# Patient Record
Sex: FEMALE | Race: WHITE | NOT HISPANIC OR LATINO | Employment: OTHER | URBAN - METROPOLITAN AREA
[De-identification: names, ages, dates, MRNs, and addresses within clinical notes are randomized per-mention and may not be internally consistent; named-entity substitution may affect disease eponyms.]

---

## 2017-03-03 ENCOUNTER — OFFICE VISIT (OUTPATIENT)
Dept: NEUROLOGY | Facility: CLINIC | Age: 42
End: 2017-03-03

## 2017-03-03 VITALS
OXYGEN SATURATION: 99 % | DIASTOLIC BLOOD PRESSURE: 79 MMHG | SYSTOLIC BLOOD PRESSURE: 119 MMHG | WEIGHT: 176 LBS | HEART RATE: 86 BPM | RESPIRATION RATE: 20 BRPM | HEIGHT: 64 IN | BODY MASS INDEX: 30.05 KG/M2

## 2017-03-03 DIAGNOSIS — G40.209 PARTIAL SYMPTOMATIC EPILEPSY WITH COMPLEX PARTIAL SEIZURES, NOT INTRACTABLE, WITHOUT STATUS EPILEPTICUS (H): Primary | ICD-10-CM

## 2017-03-03 DIAGNOSIS — G40.909 SEIZURE DISORDER (H): ICD-10-CM

## 2017-03-03 DIAGNOSIS — G40.209 PARTIAL SYMPTOMATIC EPILEPSY WITH COMPLEX PARTIAL SEIZURES, NOT INTRACTABLE, WITHOUT STATUS EPILEPTICUS (H): ICD-10-CM

## 2017-03-03 RX ORDER — LEVETIRACETAM 500 MG/1
1000 TABLET ORAL 2 TIMES DAILY
Qty: 360 TABLET | Refills: 11 | Status: SHIPPED | OUTPATIENT
Start: 2017-03-03 | End: 2018-03-12

## 2017-03-03 ASSESSMENT — PAIN SCALES - GENERAL: PAINLEVEL: SEVERE PAIN (6)

## 2017-03-03 NOTE — PROGRESS NOTES
March 3, 2017        Theresa Hall NP   [facility]    [address] [place] [address]      RE: Iva Valencia   MRN: [id number]    : 1975              Dear Dr. Hall:        Ms. 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  She also suffers from depression and also marijuana use.  She is going through a hard time as her son who is in the [employer] recently broke his leg in one of the exercises in [place].  Her  is from [place] and she is concerned about him as well.  She had an emergency room visit on  for depression with suicidal ideation.  She reports depression has continued but she is not suicidal.  We encouraged her to go have psych care and she says she will be setting that up.  We did her 's forms today.  Last seizure was in 2016 at which time we did not fill out the form.  She says she has not had any seizures lately.  She is on levetiracetam on a dose of 1000 twice a day.  Her last concentration on her previous visit in 2016 was 42.4.  Her last EEG with me was in  and it showed abnormal right temporal slowing with no interictal abnormalities.  She has had imaging studies of her brain.  The last MRI of her brain was done in  when she was seeing Dr. 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      Iva is a very brittle person with depression again very severe.  We will very cautiously approve her 's license but in the past she has broken through with seizures.  She needs to get into therapy, especially now that she is experiencing uncertainty with her  who is from [place].        She would do that.  She is not suicidal today.  We will see her and check her levels today.        Sincerely,        MD AMY Soto MD             D: 2017 12:03   T: 2017  14:44   MT:       Name:     GENE REYES   MRN:      [id number]        Account:      [id number]   :      1975           Service Date: 2017      Document: [id number]

## 2017-03-03 NOTE — LETTER
3/3/2017       RE: Iva Valencia  [address]    [facility] [address]     Dear Colleague,    Thank you for referring your patient, Iva Valencia, to the [facility] NEUROLOGY at [facility]. Please see a copy of my visit note below.    March 3, 2017        Theresa Hall NP   [facility]    [address] [place] [address]      RE: Iva Valencia   MRN: [id number]    : 1975              Dear Dr. Hall:        Ms. 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  She also suffers from depression and also marijuana use.  She is going through a hard time as her son who is in the [employer] recently broke his leg in one of the exercises in [place].  Her  is from [place] and she is concerned about him as well.  She had an emergency room visit on  for depression with suicidal ideation.  She reports depression has continued but she is not suicidal.  We encouraged her to go have psych care and she says she will be setting that up.  We did her 's forms today.  Last seizure was in 2016 at which time we did not fill out the form.  She says she has not had any seizures lately.  She is on levetiracetam on a dose of 1000 twice a day.  Her last concentration on her previous visit in 2016 was 42.4.  Her last EEG with me was in  and it showed abnormal right temporal slowing with no interictal abnormalities.  She has had imaging studies of her brain.  The last MRI of her brain was done in  when she was seeing Dr. 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      Iva is a very brittle person with depression again very severe.  We will very cautiously approve her 's license but in the past she has broken through with seizures.  She needs to get into therapy, especially now that  she is experiencing uncertainty with her  who is from [place].        She would do that.  She is not suicidal today.  We will see her and check her levels today.        Sincerely,        Jon Boss MD      D: 2017 12:03   T: 2017 14:44   MT: kt      Name:     GENE REYES   MRN:      [id number]        Account:      [id number]   :      1975           Service Date: 2017      Document: [id number]

## 2017-03-04 LAB — LEVETIRACETAM SERPL-MCNC: 39.2 UG/ML

## 2017-05-20 ENCOUNTER — HOSPITAL ENCOUNTER (EMERGENCY)
Facility: CLINIC | Age: 42
Discharge: HOME | End: 2017-05-20
Attending: EMERGENCY MEDICINE | Admitting: EMERGENCY MEDICINE
Payer: MEDICARE

## 2017-05-20 VITALS
SYSTOLIC BLOOD PRESSURE: 134 MMHG | RESPIRATION RATE: 18 BRPM | OXYGEN SATURATION: 100 % | TEMPERATURE: 98.8 F | DIASTOLIC BLOOD PRESSURE: 94 MMHG

## 2017-05-20 DIAGNOSIS — F43.20 ADJUSTMENT DISORDER, UNSPECIFIED TYPE: ICD-10-CM

## 2017-05-20 PROCEDURE — 25000132 ZZH RX MED GY IP 250 OP 250 PS 637: Mod: GY | Performed by: EMERGENCY MEDICINE

## 2017-05-20 PROCEDURE — 99284 EMERGENCY DEPT VISIT MOD MDM: CPT | Mod: Z6 | Performed by: EMERGENCY MEDICINE

## 2017-05-20 PROCEDURE — 99283 EMERGENCY DEPT VISIT LOW MDM: CPT

## 2017-05-20 RX ORDER — LORAZEPAM 1 MG/1
1 TABLET ORAL ONCE
Status: COMPLETED | OUTPATIENT
Start: 2017-05-20 | End: 2017-05-20

## 2017-05-20 RX ORDER — HYDROXYZINE HYDROCHLORIDE 25 MG/1
50-100 TABLET, FILM COATED ORAL
Qty: 30 TABLET | Refills: 1 | Status: SHIPPED | OUTPATIENT
Start: 2017-05-20 | End: 2018-02-19

## 2017-05-20 RX ADMIN — LORAZEPAM 1 MG: 1 TABLET ORAL at 01:49

## 2017-05-20 NOTE — DISCHARGE INSTRUCTIONS
Please make an appointment to follow up with [facility] (phone: [phone]) as soon as possible.

## 2017-05-20 NOTE — ED AVS SNAPSHOT
[facility], Emergency Department    [address]    [facility] [address]    Phone:  [phone]    Fax:  [phone]                                       Iva Valencia   MRN: [id number]    Department:  [facility], Emergency Department   Date of Visit:  5/20/2017           After Visit Summary Signature Page     I have received my discharge instructions, and my questions have been answered. I have discussed any challenges I see with this plan with the nurse or doctor.    ..........................................................................................................................................  Patient/Patient Representative Signature      ..........................................................................................................................................  Patient Representative Print Name and Relationship to Patient    ..................................................               ................................................  Date                                            Time    ..........................................................................................................................................  Reviewed by Signature/Title    ...................................................              ..............................................  Date                                                            Time

## 2017-05-20 NOTE — ED AVS SNAPSHOT
[facility], Emergency Department    [address]    [place] [address]    Phone:  [phone]    Fax:  [phone]                                       Iva Valencia   MRN: [id number]    Department:  [facility], Emergency Department   Date of Visit:  5/20/2017           Patient Information     Date Of Birth          1975        Your diagnoses for this visit were:     Adjustment disorder, unspecified type        You were seen by Jesus Dunne MD.        Discharge Instructions       Please make an appointment to follow up with [facility] (phone: [phone]) as soon as possible.      Future Appointments        Provider Department Dept Phone Center    9/5/2017 11:00 AM Jon Boss MD [facility] Neurology [phone] [facility]      24 Hour Appointment Hotline       To make an appointment at any [facility], call [phone] ([phone]). If you don't have a family doctor or clinic, we will help you find one. [facility] are conveniently located to serve the needs of you and your family.             Review of your medicines      START taking        Dose / Directions Last dose taken    hydrOXYzine 25 MG tablet   Commonly known as:  ATARAX   Dose:   mg   Quantity:  30 tablet        Take 2-4 tablets ( mg) by mouth nightly as needed for itching   Refills:  1          Our records show that you are taking the medicines listed below. If these are incorrect, please call your family doctor or clinic.        Dose / Directions Last dose taken    CYMBALTA 60 MG EC capsule   Dose:  120 mg   Generic drug:  DULoxetine        Take 120 mg by mouth daily   Refills:  0        diphenhydrAMINE 25 MG tablet   Commonly known as:  BENADRYL   Quantity:  30 tablet        Take 1-2 tablets up to every 6 hours as needed for facial movements.   Refills:  0        FLEXERIL PO        Refills:  0        fluticasone 50 MCG/ACT spray   Commonly known as:  FLONASE   Dose:  1-2 spray    Quantity:  1 Package        Spray 1-2 sprays into both nostrils daily as needed.   Refills:  2        ibuprofen 200 MG tablet   Commonly known as:  ADVIL/MOTRIN   Dose:  1-2 tablet        Take 1-2 tablets by mouth. Every 4 to 6 hours as needed.   Refills:  0        IRON SUPPLEMENT PO   Dose:  65 mg        Take 65 mg by mouth daily (with breakfast)   Refills:  0        levETIRAcetam 500 MG tablet   Commonly known as:  KEPPRA   Dose:  1000 mg   Quantity:  360 tablet        Take 2 tablets (1,000 mg) by mouth 2 times daily   Refills:  11        levothyroxine 50 MCG tablet   Commonly known as:  SYNTHROID   Dose:  50 mcg   Quantity:  90 tablet        Take 1 tablet by mouth daily.   Refills:  1        MELATONIN PO        Refills:  0        METRONIDAZOLE PO   Dose:  500 mg        Take 500 mg by mouth 2 times daily   Refills:  0        Multi-vitamin Tabs tablet   Dose:  1 tablet        Take 1 tablet by mouth daily   Refills:  0        NEXIUM PO        Take by mouth 2 times daily   Refills:  0        omega-3 fatty acids 1200 MG capsule   Dose:  1 capsule        Take 1 capsule by mouth daily.   Refills:  0        TORADOL IM   Dose:  1 mL        Inject 1 mL into the muscle as needed   Refills:  0        TYLENOL 500 MG tablet   Dose:  1-2 tablet   Generic drug:  acetaminophen        Take 1-2 tablets by mouth every 6 hours as needed.   Refills:  0        ZOFRAN 8 MG tablet   Dose:  8 mg   Generic drug:  ondansetron        Take 8 mg by mouth every 8 hours as needed for nausea   Refills:  0                Prescriptions were sent or printed at these locations (1 Prescription)                   Other Prescriptions                Printed at Department/Unit printer (1 of 1)         hydrOXYzine (ATARAX) 25 MG tablet                Orders Needing Specimen Collection     None      Pending Results     No orders found from 5/18/2017 to 5/21/2017.            Pending Culture Results     No orders found from 5/18/2017 to 5/21/2017.           "  Pending Results Instructions     If you had any lab results that were not finalized at the time of your Discharge, you can call the ED Lab Result RN at [phone]. You will be contacted by this team for any positive Lab results or changes in treatment. The nurses are available 7 days a week from 10A to 6:30P.  You can leave a message 24 hours per day and they will return your call.        Thank you for choosing [facility]       Thank you for choosing [facility] for your care. Our goal is always to provide you with excellent care. Hearing back from our patients is one way we can continue to improve our services. Please take a few minutes to complete the written survey that you may receive in the mail after you visit with us. Thank you!        [facility] Information     [employer] lets you send messages to your doctor, view your test results, renew your prescriptions, schedule appointments and more. To sign up, go to [email or web address] . Click on \"Log in\" on the left side of the screen, which will take you to the Welcome page. Then click on \"Sign up Now\" on the right side of the page.     You will be asked to enter the access code listed below, as well as some personal information. Please follow the directions to create your username and password.     Your access code is: [email or web address]  Expires: 2017  7:31 AM     Your access code will  in 90 days. If you need help or a new code, please call your [facility] clinic or [phone].        Care EveryWhere ID     This is your Care EveryWhere ID. This could be used by other organizations to access your [facility] medical records  [id number]        After Visit Summary       This is your record. Keep this with you and show to your community pharmacist(s) and doctor(s) at your next visit.                  "

## 2017-05-20 NOTE — ED NOTES
Bed: HW  Expected date: 5/20/17  Expected time: 12:16 AM  Means of arrival: Ambulance  Comments:  Renée [address]  42F  Anxiety attack  12 min out
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
## 2017-08-08 ENCOUNTER — HOSPITAL ENCOUNTER (EMERGENCY)
Facility: CLINIC | Age: 42
Discharge: HOME | End: 2017-08-08
Attending: EMERGENCY MEDICINE | Admitting: EMERGENCY MEDICINE
Payer: MEDICARE

## 2017-08-08 VITALS
TEMPERATURE: 97.9 F | RESPIRATION RATE: 16 BRPM | OXYGEN SATURATION: 99 % | HEART RATE: 73 BPM | DIASTOLIC BLOOD PRESSURE: 94 MMHG | SYSTOLIC BLOOD PRESSURE: 171 MMHG

## 2017-08-08 DIAGNOSIS — G43.801 OTHER MIGRAINE WITH STATUS MIGRAINOSUS, NOT INTRACTABLE: ICD-10-CM

## 2017-08-08 DIAGNOSIS — F32.A DEPRESSION, UNSPECIFIED DEPRESSION TYPE: ICD-10-CM

## 2017-08-08 LAB
ANION GAP SERPL CALCULATED.3IONS-SCNC: 12 MMOL/L (ref 3–14)
BASOPHILS # BLD AUTO: 0.1 10E9/L (ref 0–0.2)
BASOPHILS NFR BLD AUTO: 0.9 %
BUN SERPL-MCNC: 11 MG/DL (ref 7–30)
CALCIUM SERPL-MCNC: 8.9 MG/DL (ref 8.5–10.1)
CHLORIDE SERPL-SCNC: 110 MMOL/L (ref 94–109)
CO2 SERPL-SCNC: 23 MMOL/L (ref 20–32)
CREAT SERPL-MCNC: 0.74 MG/DL (ref 0.52–1.04)
DIFFERENTIAL METHOD BLD: NORMAL
EOSINOPHIL # BLD AUTO: 0.2 10E9/L (ref 0–0.7)
EOSINOPHIL NFR BLD AUTO: 3 %
ERYTHROCYTE [DISTWIDTH] IN BLOOD BY AUTOMATED COUNT: 12.5 % (ref 10–15)
GFR SERPL CREATININE-BSD FRML MDRD: 87 ML/MIN/1.7M2
GLUCOSE SERPL-MCNC: 99 MG/DL (ref 70–99)
HCT VFR BLD AUTO: 40.5 % (ref 35–47)
HGB BLD-MCNC: 13.2 G/DL (ref 11.7–15.7)
IMM GRANULOCYTES # BLD: 0 10E9/L (ref 0–0.4)
IMM GRANULOCYTES NFR BLD: 0.1 %
LYMPHOCYTES # BLD AUTO: 2.1 10E9/L (ref 0.8–5.3)
LYMPHOCYTES NFR BLD AUTO: 31.6 %
MCH RBC QN AUTO: 28 PG (ref 26.5–33)
MCHC RBC AUTO-ENTMCNC: 32.6 G/DL (ref 31.5–36.5)
MCV RBC AUTO: 86 FL (ref 78–100)
MONOCYTES # BLD AUTO: 0.8 10E9/L (ref 0–1.3)
MONOCYTES NFR BLD AUTO: 12.3 %
NEUTROPHILS # BLD AUTO: 3.5 10E9/L (ref 1.6–8.3)
NEUTROPHILS NFR BLD AUTO: 52.1 %
NRBC # BLD AUTO: 0 10*3/UL
NRBC BLD AUTO-RTO: 0 /100
PLATELET # BLD AUTO: 236 10E9/L (ref 150–450)
POTASSIUM SERPL-SCNC: 3.7 MMOL/L (ref 3.4–5.3)
RBC # BLD AUTO: 4.71 10E12/L (ref 3.8–5.2)
SODIUM SERPL-SCNC: 145 MMOL/L (ref 133–144)
WBC # BLD AUTO: 6.7 10E9/L (ref 4–11)

## 2017-08-08 PROCEDURE — 25000128 H RX IP 250 OP 636: Performed by: EMERGENCY MEDICINE

## 2017-08-08 PROCEDURE — 96361 HYDRATE IV INFUSION ADD-ON: CPT | Performed by: EMERGENCY MEDICINE

## 2017-08-08 PROCEDURE — 99285 EMERGENCY DEPT VISIT HI MDM: CPT | Mod: 25 | Performed by: EMERGENCY MEDICINE

## 2017-08-08 PROCEDURE — 80048 BASIC METABOLIC PNL TOTAL CA: CPT | Performed by: EMERGENCY MEDICINE

## 2017-08-08 PROCEDURE — 85025 COMPLETE CBC W/AUTO DIFF WBC: CPT | Performed by: EMERGENCY MEDICINE

## 2017-08-08 PROCEDURE — 96374 THER/PROPH/DIAG INJ IV PUSH: CPT | Performed by: EMERGENCY MEDICINE

## 2017-08-08 PROCEDURE — 99285 EMERGENCY DEPT VISIT HI MDM: CPT | Mod: Z6 | Performed by: EMERGENCY MEDICINE

## 2017-08-08 PROCEDURE — 96375 TX/PRO/DX INJ NEW DRUG ADDON: CPT | Performed by: EMERGENCY MEDICINE

## 2017-08-08 RX ORDER — KETOROLAC TROMETHAMINE 30 MG/ML
30 INJECTION, SOLUTION INTRAMUSCULAR; INTRAVENOUS ONCE
Status: COMPLETED | OUTPATIENT
Start: 2017-08-08 | End: 2017-08-08

## 2017-08-08 RX ORDER — ONDANSETRON 2 MG/ML
4 INJECTION INTRAMUSCULAR; INTRAVENOUS EVERY 30 MIN PRN
Status: DISCONTINUED | OUTPATIENT
Start: 2017-08-08 | End: 2017-08-09 | Stop reason: HOSPADM

## 2017-08-08 RX ORDER — DIPHENHYDRAMINE HYDROCHLORIDE 50 MG/ML
25 INJECTION INTRAMUSCULAR; INTRAVENOUS ONCE
Status: COMPLETED | OUTPATIENT
Start: 2017-08-08 | End: 2017-08-08

## 2017-08-08 RX ORDER — MORPHINE SULFATE 2 MG/ML
2 INJECTION, SOLUTION INTRAMUSCULAR; INTRAVENOUS
Status: COMPLETED | OUTPATIENT
Start: 2017-08-08 | End: 2017-08-08

## 2017-08-08 RX ORDER — SODIUM CHLORIDE 9 MG/ML
1000 INJECTION, SOLUTION INTRAVENOUS CONTINUOUS
Status: DISCONTINUED | OUTPATIENT
Start: 2017-08-08 | End: 2017-08-09 | Stop reason: HOSPADM

## 2017-08-08 RX ADMIN — KETOROLAC TROMETHAMINE 30 MG: 30 INJECTION, SOLUTION INTRAMUSCULAR at 20:50

## 2017-08-08 RX ADMIN — SODIUM CHLORIDE 1000 ML: 9 INJECTION, SOLUTION INTRAVENOUS at 22:03

## 2017-08-08 RX ADMIN — ONDANSETRON 4 MG: 2 INJECTION INTRAMUSCULAR; INTRAVENOUS at 20:47

## 2017-08-08 RX ADMIN — SODIUM CHLORIDE 1000 ML: 9 INJECTION, SOLUTION INTRAVENOUS at 20:46

## 2017-08-08 RX ADMIN — DIPHENHYDRAMINE HYDROCHLORIDE 25 MG: 50 INJECTION, SOLUTION INTRAMUSCULAR; INTRAVENOUS at 20:50

## 2017-08-08 RX ADMIN — MORPHINE SULFATE 2 MG: 2 INJECTION, SOLUTION INTRAMUSCULAR; INTRAVENOUS at 22:04

## 2017-08-08 ASSESSMENT — ENCOUNTER SYMPTOMS
VOMITING: 0
WEAKNESS: 1
PHOTOPHOBIA: 1
HEADACHES: 1
NUMBNESS: 1
FEVER: 0
NAUSEA: 1

## 2017-08-08 NOTE — ED AVS SNAPSHOT
[facility], Emergency Department    [address]    [facility] [place] [address]    Phone:  [phone]    Fax:  [phone]                                       Iva Valencia   MRN: [id number]    Department:  [facility], Emergency Department   Date of Visit:  8/8/2017           Patient Information     Date Of Birth          1975        Your diagnoses for this visit were:     Other migraine with status migrainosus, not intractable     Depression, unspecified depression type        You were seen by Aretha Rodriguez MD.        Discharge Instructions       Please make an appointment to follow up with Your Primary Care Provider in 2-4 days even if entirely better.  Return to the ER if any other problems/concerns.       Discharge References/Attachments     HEADACHE, MIGRAINE: STAGES AND TREATMENT (ENGLISH)      Future Appointments        Provider Department Dept Phone Center    9/5/2017 11:00 AM Jon Boss MD [facility] Neurology [phone] [facility]      24 Hour Appointment Hotline       To make an appointment at any [facility], call [phone] ([phone]). If you don't have a family doctor or clinic, we will help you find one. [facility] clinics are conveniently located to serve the needs of you and your family.             Review of your medicines      Our records show that you are taking the medicines listed below. If these are incorrect, please call your family doctor or clinic.        Dose / Directions Last dose taken    BUSPIRONE HCL PO        Refills:  0        CYMBALTA 60 MG EC capsule   Dose:  120 mg   Generic drug:  DULoxetine        Take 120 mg by mouth daily   Refills:  0        diphenhydrAMINE 25 MG tablet   Commonly known as:  BENADRYL   Quantity:  30 tablet        Take 1-2 tablets up to every 6 hours as needed for facial movements.   Refills:  0        DOXYCYCLINE HYCLATE PO        Refills:  0        FLEXERIL PO        Refills:  0        fluticasone 50 MCG/ACT spray    Commonly known as:  FLONASE   Dose:  1-2 spray   Quantity:  1 Package        Spray 1-2 sprays into both nostrils daily as needed.   Refills:  2        hydrOXYzine 25 MG tablet   Commonly known as:  ATARAX   Dose:   mg   Quantity:  30 tablet        Take 2-4 tablets ( mg) by mouth nightly as needed for itching   Refills:  1        ibuprofen 200 MG tablet   Commonly known as:  ADVIL/MOTRIN   Dose:  1-2 tablet        Take 1-2 tablets by mouth. Every 4 to 6 hours as needed.   Refills:  0        IRON SUPPLEMENT PO   Dose:  65 mg        Take 65 mg by mouth daily (with breakfast)   Refills:  0        levETIRAcetam 500 MG tablet   Commonly known as:  KEPPRA   Dose:  1000 mg   Quantity:  360 tablet        Take 2 tablets (1,000 mg) by mouth 2 times daily   Refills:  11        levothyroxine 50 MCG tablet   Commonly known as:  SYNTHROID   Dose:  50 mcg   Quantity:  90 tablet        Take 1 tablet by mouth daily.   Refills:  1        MELATONIN PO        Refills:  0        METRONIDAZOLE PO   Dose:  500 mg        Take 500 mg by mouth 2 times daily   Refills:  0        Multi-vitamin Tabs tablet   Dose:  1 tablet        Take 1 tablet by mouth daily   Refills:  0        NEXIUM PO        Take by mouth 2 times daily   Refills:  0        omega-3 fatty acids 1200 MG capsule   Dose:  1 capsule        Take 1 capsule by mouth daily.   Refills:  0        PROZAC PO   Dose:  20 mg        Take 20 mg by mouth daily   Refills:  0        TORADOL IM   Dose:  1 mL        Inject 1 mL into the muscle as needed   Refills:  0        TYLENOL 500 MG tablet   Dose:  1-2 tablet   Generic drug:  acetaminophen        Take 1-2 tablets by mouth every 6 hours as needed.   Refills:  0        ZANTAC PO        Refills:  0        ZOFRAN 8 MG tablet   Dose:  8 mg   Generic drug:  ondansetron        Take 8 mg by mouth every 8 hours as needed for nausea   Refills:  0                Procedures and tests performed during your visit     Basic metabolic panel  "   CBC with platelets differential      Orders Needing Specimen Collection     None      Pending Results     No orders found from 2017 to 2017.            Pending Culture Results     No orders found from 2017 to 2017.            Pending Results Instructions     If you had any lab results that were not finalized at the time of your Discharge, you can call the ED Lab Result RN at [phone]. You will be contacted by this team for any positive Lab results or changes in treatment. The nurses are available 7 days a week from 10A to 6:30P.  You can leave a message 24 hours per day and they will return your call.        Thank you for choosing [facility]       Thank you for choosing [facility] for your care. Our goal is always to provide you with excellent care. Hearing back from our patients is one way we can continue to improve our services. Please take a few minutes to complete the written survey that you may receive in the mail after you visit with us. Thank you!        [employer] Information     [facility] lets you send messages to your doctor, view your test results, renew your prescriptions, schedule appointments and more. To sign up, go to [email or web address] . Click on \"Log in\" on the left side of the screen, which will take you to the Welcome page. Then click on \"Sign up Now\" on the right side of the page.     You will be asked to enter the access code listed below, as well as some personal information. Please follow the directions to create your username and password.     Your access code is: [email or web address]  Expires: 2017 10:42 PM     Your access code will  in 90 days. If you need help or a new code, please call your [facility] clinic or [phone].        Care EveryWhere ID     This is your Care EveryWhere ID. This could be used by other organizations to access your [facility] medical records  [id number]        Equal Access to Services     KARSON ALBRIGHT: stormy Enciso " denver morel waxay idiin hayaan adeeg kharash la'aan ah. So [facility] [phone].    ATENCIÓN: Si habla bisi, tiene a arellano disposición servicios gratuitos de asistencia lingüística. Llame al [phone].    We comply with applicable federal civil rights laws and [place] laws. We do not discriminate on the basis of race, color, national origin, age, disability sex, sexual orientation or gender identity.            After Visit Summary       This is your record. Keep this with you and show to your community pharmacist(s) and doctor(s) at your next visit.

## 2017-08-08 NOTE — ED AVS SNAPSHOT
[facility], Emergency Department    [address]    [facility] [address]    Phone:  [phone]    Fax:  [phone]                                       Iva Valencia   MRN: [id number]    Department:  [facility], Emergency Department   Date of Visit:  8/8/2017           After Visit Summary Signature Page     I have received my discharge instructions, and my questions have been answered. I have discussed any challenges I see with this plan with the nurse or doctor.    ..........................................................................................................................................  Patient/Patient Representative Signature      ..........................................................................................................................................  Patient Representative Print Name and Relationship to Patient    ..................................................               ................................................  Date                                            Time    ..........................................................................................................................................  Reviewed by Signature/Title    ...................................................              ..............................................  Date                                                            Time

## 2017-08-09 NOTE — ED NOTES
Patient brought to ED by EMT. EMT states that patient is depressed and also complaining of migraine. Pt has vaginitis which is being treated by doxycycline but patient told EMT she thinks the doctor gave her fake pills. Upon assessment patient states her depression is well managed and she is here only to be seen for migraine and vaginitis and not depression.

## 2017-08-09 NOTE — DISCHARGE INSTRUCTIONS
Please make an appointment to follow up with Your Primary Care Provider in 2-4 days even if entirely better.  Return to the ER if any other problems/concerns.

## 2017-08-09 NOTE — ED NOTES
Bed: ED11  Expected date:   Expected time:   Means of arrival:   Comments:  sylvester [id number]    Migraine and depression

## 2017-08-09 NOTE — ED PROVIDER NOTES
History     Chief Complaint   Patient presents with     Headache     Vaginitis     patient is on antibiotics treatment but thinks the antibiotic is what is causing the migraine      Depression     HPI  Iva Valencia is a 42 year old female with a history of chronic migraines, fibromyalgia, GERD, seizures, depression, and anxiety who presents to the Emergency Department via EMS for evaluation of migraine and depression. Patient reports that she has had a migraine for the past 3 days, which worsened today with photophobia, nausea, and weakness. She has given herself 3 Toradol shots (each is 2 mL), with the last being 2 days ago. When the migraine began, it was concentrated to the top and front of her head, but has since radiated to the back of her head. Although nauseated, she was able to eat a small pizza today. In the past, she got about 4 migraines per week, but have decreased in frequency since she stopped taking her omeprazole. She is having some tingling of her fingertips, this is new. She has some upper neck pain, but denies stiffness. She notes increased depression due to the migraine, but denies suicidal or homicidal ideation. She is currently being treated for vaginitis with doxycycline, which she believes could be contributing to her migraine. However, she has been treated with doxycyline in the past without migraines. She denies chest pain or fever.    Past Medical History:   Diagnosis Date     Anemia      Anxiety      Depressive disorder      Fibromyalgia      Gastro-oesophageal reflux disease      Headache(784.0)     Common migraine.  Neuro consult--resolved with treatment of seizures     Herpes simplex without mention of complication 5/19/2006     Seizures (H)     Last seizure over one year ago.      Tendonitis      Thyroid disease        Past Surgical History:   Procedure Laterality Date     C EXC PRES ULCER UNLISTED  2007    heel ulcer treatement     C NONSPECIFIC PROCEDURE  2006     retrocalcaneal bursitis and Sarah deformity     C NONSPECIFIC PROCEDURE  2007    right ankle arthroscopy     C NONSPECIFIC PROCEDURE  2008    foot surgery     CHOLECYSTECTOMY, LAPOROSCOPIC  Feb 2011    Cholecystectomy, Laparoscopic     CRYOTHERAPY, CERVICAL       D & C       PE TUBES       TONSILLECTOMY         Family History   Problem Relation Age of Onset     Musculoskeletal Disorder Mother      Sjogrens      Lipids Father      HEART DISEASE Maternal Grandmother      HEART DISEASE Paternal Grandmother      HEART DISEASE Paternal Grandfather      CANCER Maternal Grandfather      Pancreatic     Obesity Mother      Obesity Maternal Grandmother      Neurologic Disorder Paternal Grandfather      Lipids Paternal Grandmother      OSTEOPOROSIS Maternal Grandmother      Depression Mother      Eye Disorder Maternal Grandmother      Cornea surgery     Bipolar Disorder Son        Social History   Substance Use Topics     Smoking status: Former Smoker     Types: Cigarettes     Quit date: 8/5/1998     Smokeless tobacco: Never Used     Alcohol use Yes      Comment: 5 times a year       Current Facility-Administered Medications   Medication     0.9% sodium chloride BOLUS    Followed by     0.9% sodium chloride infusion     ondansetron (ZOFRAN) injection 4 mg     ketorolac (TORADOL) injection 30 mg     diphenhydrAMINE (BENADRYL) injection 25 mg     Current Outpatient Prescriptions   Medication     DOXYCYCLINE HYCLATE PO     FLUoxetine HCl (PROZAC PO)     RaNITidine HCl (ZANTAC PO)     BUSPIRONE HCL PO     MELATONIN PO     levETIRAcetam (KEPPRA) 500 MG tablet     Ferrous Sulfate (IRON SUPPLEMENT PO)     ondansetron (ZOFRAN) 8 MG tablet     multivitamin, therapeutic with minerals (MULTI-VITAMIN) TABS     Ketorolac Tromethamine (TORADOL IM)     fluticasone (FLONASE) 50 MCG/ACT nasal spray     levothyroxine (SYNTHROID) 50 MCG tablet     Omega-3 Fatty Acids (OMEGA 3) 1200 MG capsule     acetaminophen (TYLENOL) 500 MG tablet      ibuprofen (ADVIL,MOTRIN) 200 MG tablet     Cyclobenzaprine HCl (FLEXERIL PO)     hydrOXYzine (ATARAX) 25 MG tablet     METRONIDAZOLE PO     Esomeprazole Magnesium (NEXIUM PO)     DULoxetine (CYMBALTA) 60 MG capsule     diphenhydrAMINE (BENADRYL) 25 MG tablet        Allergies   Allergen Reactions     Ceclor [Cephalosporins]      Claritin [Loratadine]      Compazine      Side effects     Droperidol      Facial swelling and movements. Dystonic reaction       Penicillins      Phenergan [Promethazine Hcl] Rash     Prednisone Other (See Comments)     suicidal     Propranolol      Reglan [Metoclopramide]      Doesn't remember      Sumatriptan      Tessalon Perles [Benzonatate]      Zyprexa [Olanzapine] Unknown     Levothyroxine Rash       I have reviewed the Medications, Allergies, Past Medical and Surgical History, and Social History in the Epic system.    Review of Systems   Constitutional: Negative for fever.   Eyes: Positive for photophobia.   Cardiovascular: Negative for chest pain.   Gastrointestinal: Positive for nausea. Negative for vomiting.   Neurological: Positive for weakness, numbness (tingling fingertips) and headaches.   Psychiatric/Behavioral: Negative for suicidal ideas.   All other systems reviewed and are negative.      Physical Exam   BP: 131/75  Pulse: 84  Temp: 98.4  F (36.9  C)  Resp: 16  SpO2: 100 %  Physical Exam   Constitutional: She is oriented to person, place, and time. She appears well-developed and well-nourished.   HENT:   Head: Normocephalic and atraumatic.   Eyes: Pupils are equal, round, and reactive to light.   Neck: Normal range of motion. Neck supple.   Cardiovascular: Normal rate, regular rhythm and normal heart sounds.    Pulmonary/Chest: Effort normal and breath sounds normal. No respiratory distress. She has no wheezes. She has no rales.   Abdominal: Soft. She exhibits no distension. There is no tenderness. There is no rebound.   Musculoskeletal: She exhibits no tenderness.    Neurological: She is alert and oriented to person, place, and time. No cranial nerve deficit. Coordination normal.   Skin: Skin is warm and dry.   Psychiatric: She has a normal mood and affect. Her behavior is normal. Thought content normal.       ED Course   8:02 PM  The patient was seen and examined by Aretha Rodriguez MD in Room 6.     ED Course     Procedures             Critical Care time:  none         Results for orders placed or performed during the hospital encounter of 08/08/17   CBC with platelets differential   Result Value Ref Range    WBC 6.7 4.0 - 11.0 10e9/L    RBC Count 4.71 3.8 - 5.2 10e12/L    Hemoglobin 13.2 11.7 - 15.7 g/dL    Hematocrit 40.5 35.0 - 47.0 %    MCV 86 78 - 100 fl    MCH 28.0 26.5 - 33.0 pg    MCHC 32.6 31.5 - 36.5 g/dL    RDW 12.5 10.0 - 15.0 %    Platelet Count 236 150 - 450 10e9/L    Diff Method Automated Method     % Neutrophils 52.1 %    % Lymphocytes 31.6 %    % Monocytes 12.3 %    % Eosinophils 3.0 %    % Basophils 0.9 %    % Immature Granulocytes 0.1 %    Nucleated RBCs 0 0 /100    Absolute Neutrophil 3.5 1.6 - 8.3 10e9/L    Absolute Lymphocytes 2.1 0.8 - 5.3 10e9/L    Absolute Monocytes 0.8 0.0 - 1.3 10e9/L    Absolute Eosinophils 0.2 0.0 - 0.7 10e9/L    Absolute Basophils 0.1 0.0 - 0.2 10e9/L    Abs Immature Granulocytes 0.0 0 - 0.4 10e9/L    Absolute Nucleated RBC 0.0    Basic metabolic panel   Result Value Ref Range    Sodium 145 (H) 133 - 144 mmol/L    Potassium 3.7 3.4 - 5.3 mmol/L    Chloride 110 (H) 94 - 109 mmol/L    Carbon Dioxide 23 20 - 32 mmol/L    Anion Gap 12 3 - 14 mmol/L    Glucose 99 70 - 99 mg/dL    Urea Nitrogen 11 7 - 30 mg/dL    Creatinine 0.74 0.52 - 1.04 mg/dL    GFR Estimate 87 >60 mL/min/1.7m2    GFR Estimate If Black >90   GFR Calc   >60 mL/min/1.7m2    Calcium 8.9 8.5 - 10.1 mg/dL     Medications   0.9% sodium chloride BOLUS (0 mLs Intravenous Stopped 8/8/17 2202)     Followed by   0.9% sodium chloride infusion (0 mLs Intravenous  Stopped 8/8/17 2242)   ondansetron (ZOFRAN) injection 4 mg (4 mg Intravenous Given 8/8/17 2047)   ketorolac (TORADOL) injection 30 mg (30 mg Intravenous Given 8/8/17 2050)   diphenhydrAMINE (BENADRYL) injection 25 mg (25 mg Intravenous Given 8/8/17 2050)   morphine (PF) injection 2 mg (2 mg Intravenous Given 8/8/17 2204)              Labs Ordered and Resulted from Time of ED Arrival Up to the Time of Departure from the ED - No data to display         Assessments & Plan (with Medical Decision Making)   42-year-old female presents via complaining of headache as well as some ongoing vaginitis issues and some worsening depression due to her physical pain. Patient here was complaining of typical pain associated with her headache. She received some IV fluids and some pain medication and nausea medication is currently feeling better. Her headache has resolved.  We ll discharge the patient home with instructions to follow-up as needed.  No SI or HI to need further workup for her depression at this time.  Patient is medically stable.  Patient is stable for discharge.    This part of the document was transcribed by Renate Mann, Medical Scribe.      I have reviewed the nursing notes.    I have reviewed the findings, diagnosis, plan and need for follow up with the patient.    New Prescriptions    No medications on file       Final diagnoses:   Other migraine with status migrainosus, not intractable   Depression, unspecified depression type   I, Renate Mann, am serving as a trained medical scribe to document services personally performed by Aretha Rodriguez MD, based on the provider's statements to me.      I, Aretha Rodriguez MD, was physically present and have reviewed and verified the accuracy of this note documented by Renate Mann.       8/8/2017   [facility], [place], EMERGENCY DEPARTMENT     Aretha Rodriguez MD  08/09/17 0039

## 2017-12-12 ENCOUNTER — TRANSFERRED RECORDS (OUTPATIENT)
Dept: HEALTH INFORMATION MANAGEMENT | Facility: CLINIC | Age: 42
End: 2017-12-12

## 2017-12-21 ENCOUNTER — HOSPITAL ENCOUNTER (EMERGENCY)
Facility: CLINIC | Age: 42
Discharge: HOME | End: 2017-12-21
Attending: EMERGENCY MEDICINE | Admitting: EMERGENCY MEDICINE
Payer: MEDICARE

## 2017-12-21 VITALS
HEIGHT: 64 IN | DIASTOLIC BLOOD PRESSURE: 91 MMHG | BODY MASS INDEX: 29.02 KG/M2 | TEMPERATURE: 99.1 F | OXYGEN SATURATION: 95 % | SYSTOLIC BLOOD PRESSURE: 144 MMHG | RESPIRATION RATE: 18 BRPM | WEIGHT: 170 LBS

## 2017-12-21 DIAGNOSIS — G43.911 INTRACTABLE MIGRAINE WITH STATUS MIGRAINOSUS, UNSPECIFIED MIGRAINE TYPE: ICD-10-CM

## 2017-12-21 PROCEDURE — 96365 THER/PROPH/DIAG IV INF INIT: CPT

## 2017-12-21 PROCEDURE — 99284 EMERGENCY DEPT VISIT MOD MDM: CPT | Mod: 25

## 2017-12-21 PROCEDURE — 96367 TX/PROPH/DG ADDL SEQ IV INF: CPT

## 2017-12-21 PROCEDURE — 96376 TX/PRO/DX INJ SAME DRUG ADON: CPT

## 2017-12-21 PROCEDURE — 25000125 ZZHC RX 250: Performed by: EMERGENCY MEDICINE

## 2017-12-21 PROCEDURE — 96375 TX/PRO/DX INJ NEW DRUG ADDON: CPT

## 2017-12-21 PROCEDURE — 25000128 H RX IP 250 OP 636: Performed by: EMERGENCY MEDICINE

## 2017-12-21 RX ORDER — DIPHENHYDRAMINE HYDROCHLORIDE 50 MG/ML
25 INJECTION INTRAMUSCULAR; INTRAVENOUS ONCE
Status: DISCONTINUED | OUTPATIENT
Start: 2017-12-21 | End: 2017-12-21 | Stop reason: HOSPADM

## 2017-12-21 RX ORDER — KETOROLAC TROMETHAMINE 15 MG/ML
15 INJECTION, SOLUTION INTRAMUSCULAR; INTRAVENOUS ONCE
Status: COMPLETED | OUTPATIENT
Start: 2017-12-21 | End: 2017-12-21

## 2017-12-21 RX ORDER — DEXAMETHASONE SODIUM PHOSPHATE 10 MG/ML
10 INJECTION, SOLUTION INTRAMUSCULAR; INTRAVENOUS ONCE
Status: COMPLETED | OUTPATIENT
Start: 2017-12-21 | End: 2017-12-21

## 2017-12-21 RX ORDER — ONDANSETRON 2 MG/ML
4 INJECTION INTRAMUSCULAR; INTRAVENOUS
Status: DISCONTINUED | OUTPATIENT
Start: 2017-12-21 | End: 2017-12-21 | Stop reason: HOSPADM

## 2017-12-21 RX ORDER — DIPHENHYDRAMINE HYDROCHLORIDE 50 MG/ML
25 INJECTION INTRAMUSCULAR; INTRAVENOUS ONCE
Status: COMPLETED | OUTPATIENT
Start: 2017-12-21 | End: 2017-12-21

## 2017-12-21 RX ADMIN — Medication 2 G: at 18:22

## 2017-12-21 RX ADMIN — VALPROATE SODIUM 500 MG: 100 INJECTION, SOLUTION INTRAVENOUS at 20:32

## 2017-12-21 RX ADMIN — ONDANSETRON 4 MG: 2 INJECTION INTRAMUSCULAR; INTRAVENOUS at 19:59

## 2017-12-21 RX ADMIN — DEXAMETHASONE SODIUM PHOSPHATE 10 MG: 10 INJECTION, SOLUTION INTRAMUSCULAR; INTRAVENOUS at 18:09

## 2017-12-21 RX ADMIN — ONDANSETRON 4 MG: 2 INJECTION INTRAMUSCULAR; INTRAVENOUS at 18:09

## 2017-12-21 RX ADMIN — KETOROLAC TROMETHAMINE 15 MG: 15 INJECTION, SOLUTION INTRAMUSCULAR; INTRAVENOUS at 18:09

## 2017-12-21 RX ADMIN — DIPHENHYDRAMINE HYDROCHLORIDE 25 MG: 50 INJECTION, SOLUTION INTRAMUSCULAR; INTRAVENOUS at 18:09

## 2017-12-21 RX ADMIN — SODIUM CHLORIDE, POTASSIUM CHLORIDE, SODIUM LACTATE AND CALCIUM CHLORIDE 1000 ML: 600; 310; 30; 20 INJECTION, SOLUTION INTRAVENOUS at 18:10

## 2017-12-21 ASSESSMENT — ENCOUNTER SYMPTOMS
FEVER: 0
CHILLS: 1
PHOTOPHOBIA: 1
HEADACHES: 1

## 2017-12-21 NOTE — ED NOTES
Pt has headache for past 2 days.  She is nauseated and vomiting earlier today.   She reports it is a typical migraine.

## 2017-12-21 NOTE — ED PROVIDER NOTES
History     Chief Complaint:  Headache    The history is provided by the patient.      Iva Valencia is a 42 year old female with a history of migraines presenting to the ED today with a headache. The patient reports gradual onset of a headache beginning yesterday morning and increasing in severity to today prompting visit to the emergency department. Currently rates pain 9/10 in severity located in the front and top of the head bilaterally and associated with nausea, photophobia, and cold sweats. The patient has a history of migraines for which she follows through neurology though has required presentation in the ER for migraines before. She tried Naproxen, Tylenol, and Advil without improvement. Patient reports no measured fever or trauma to the head. Denies other complaint.     Allergies:  Ceclor [Cephalosporins]  Claritin [Loratadine]  Compazine  Droperidol  Penicillins  Phenergan [Promethazine Hcl]  Prednisone  Propranolol  Prozac [Fluoxetine]  Reglan [Metoclopramide]  Sumatriptan  Tessalon Perles [Benzonatate]  Zyprexa [Olanzapine]  Levothyroxine     Medications:    Doxycycline  Prozac  Zantac  Buspirone  Cyclobenzaprine  Melatonin  Atarax  Keppra  Metronidazole  Zofran  Nexium  Cymbalta  Toradol  Benadryl  Flonase  Synthroid    Past Medical History:  Anemia  Anxiety  Cervicalgia  Depressive disorder  Gastro-esophageal reflux disease  Herpes simplex without mention of complication  Seizures  Thyroid disease  Depression  IBS  Undifferentiated somatoform disorder  PTSD  Partial epilepsy  Fibromyalgia  Hypothyroidism  Gottron's papules    Past Surgical History:    Heel ulcer treatment  Retrocalcaneal bursitis and Sarah deformity   Right ankle arthroscopy   Foot surgery  Lap familia  Cryotherapy, cervical   D&C  PE tubes  Tonsillectomy     Family History:    Sjogren's  Lipids  Obesity  Bipolar     Social History:  Presents with spouse   Tobacco use: Former smoker, QD 1998  Alcohol use: 5x annually   PCP:  "Theresa Hall    Marital Status:      Review of Systems   Constitutional: Positive for chills. Negative for fever.   Eyes: Positive for photophobia.   Neurological: Positive for headaches.   All other systems reviewed and are negative.    Physical Exam     Patient Vitals for the past 24 hrs:   BP Temp Temp src Heart Rate Resp SpO2 Height Weight   12/21/17 2100 (!) 144/91 - - - - 95 % - -   12/21/17 2045 140/79 - - - - 94 % - -   12/21/17 2000 (!) 164/114 - - - - - - -   12/21/17 1945 (!) 148/91 - - - - - - -   12/21/17 1915 131/75 - - - - 98 % - -   12/21/17 1900 136/78 - - - - 99 % - -   12/21/17 1845 (!) 153/101 - - - - 98 % - -   12/21/17 1830 128/84 - - - - 97 % - -   12/21/17 1815 - - - - - 97 % - -   12/21/17 1652 (!) 132/99 - - - - - - -   12/21/17 1647 - 99.1  F (37.3  C) Oral 63 18 96 % 1.626 m (5' 4\") 77.1 kg (170 lb)       Physical Exam  Constitutional: Alert, attentive  HENT:    Nose: Nose normal.    Mouth/Throat: Oropharynx is clear, mucous membranes are moist  Eyes: EOM are normal. Pupils are equal, round, and reactive to light.   CV: Regular rate and rhythm, no murmurs, rubs or gallops.  Chest: Effort normal and breath sounds normal.   GI: No distension. There is no tenderness  MSK: Normal range of motion.   Neurological:   GCS 15; A/Ox3; Cranial nerves 2-12 intact;   5/5 strength throughout the upper and lower extremities;   sensation intact to light touch throughout the upper and lower extremities;   2+ DTRs to the bilateral upper and lower extremities (biceps, BRs, patellar, achilles);   normal fine motor coordination intact bilaterally;   normal gait   No meningismus   Skin: Skin is warm and dry.        Emergency Department Course   Interventions:  1809: Zofran 4 mg IV   1809: Benadryl 25 mg IV   1809: Decadron 10 mg IV   1809: Toradol 15 mg IV   1810: Lactated ringers 1000 mL IV Bolus    1822: Magnesium sulfate 2 g in NS IV Infusion   1959: Zofran 4 mg IV   2032: Depacon 500 mg in NaCl 0.9% " "50 mL IV Infusion     Emergency Department Course:  Past medical records, nursing notes, and vitals reviewed.  1752: I performed an exam of the patient and obtained history, as documented above.  An IV was inserted to administer the above interventions.    2105: I rechecked the patient. Findings and plan explained to the Patient. Patient discharged home with instructions regarding supportive care, medications, and reasons to return. The importance of close follow-up was reviewed.      Impression & Plan      Medical Decision Making:  Iva Valencia is a 42 year old female who presents with a headache consistent with her typical migraine, although more persistent and severe in nature. It gradually began yesterday and has not been associated with any \"red flag\" symptoms -- fever, weakness, numbness, paresthesias, neck stiffness, thunderclap, worst at onset character, seizures, vision changes or confusion -- to suggest more concerning etiology. The neurologic exam is normal. I do not believe imaging is indicated at this time.  Her pain has improved slightly with medication interventions.  She does have numerous allergies and requested opoiids from the RN; we discussed our policy against treating headaches with opioids. The patient should follow-up with her primary physician within 3 days. If the headache continues or the frequency increases, outpatient consultation with neurology will be indicated.  I recommended she return for worse pain, fever, vomiting, weakness, or any other concerns.      Diagnosis:    ICD-10-CM    1. Intractable migraine with status migrainosus, unspecified migraine type G43.911        Disposition:  Discharged to home with plan as outlined.      I, Amando English, am serving as a scribe at 5:52 PM on 12/21/2017 to document services personally performed by Anthony Ferrell MD based on my observations and the provider's statements to me.    12/21/2017   [facility] EMERGENCY " [facility] HospitalAnthony sky MD  12/21/17 2962

## 2017-12-21 NOTE — ED AVS SNAPSHOT
[facility] Emergency Department    [address]    [facility] [address]    Phone:  [phone]    Fax:  [phone]                                       Iva Valencia   MRN: [id number]    Department:  [facility] Emergency Department   Date of Visit:  12/21/2017           After Visit Summary Signature Page     I have received my discharge instructions, and my questions have been answered. I have discussed any challenges I see with this plan with the nurse or doctor.    ..........................................................................................................................................  Patient/Patient Representative Signature      ..........................................................................................................................................  Patient Representative Print Name and Relationship to Patient    ..................................................               ................................................  Date                                            Time    ..........................................................................................................................................  Reviewed by Signature/Title    ...................................................              ..............................................  Date                                                            Time

## 2017-12-21 NOTE — ED AVS SNAPSHOT
[facility] Emergency Department    [address] [place] [address]    Phone:  [phone]    Fax:  [phone]                                       Iva Valencia   MRN: [id number]    Department:  [facility] Emergency Department   Date of Visit:  12/21/2017           Patient Information     Date Of Birth          1975        Your diagnoses for this visit were:     Intractable migraine with status migrainosus, unspecified migraine type        You were seen by Anthony Ferrell MD.      Follow-up Information     Follow up with Theresa Hall In 3 days.    Contact information:    [facility]  [address] [place] [address]  [phone]          Discharge Instructions       Discharge Instructions  Migraine    You were seen today for a headache that your provider thinks is likely a migraine. At this time your provider does not find that your headache is a sign of anything dangerous or life-threatening.  However, sometimes the signs of serious illness do not show up right away.      Generally, every Emergency Department visit should have a follow-up clinic visit with either a primary or a specialty clinic/provider. Please follow-up as instructed by your emergency provider today.    Return to the Emergency Department if:    You get a fever of 100.4 F or higher.    You get a stiff neck with your headache.    You get a new headache that is different or worse than headaches you have had before.    You are vomiting (throwing up) and cannot keep food or water down.    You have blurry or double vision or other problems with your eyes.    You have a new weakness on one side of your body.    You have difficulty with balance which is new.    You or your family thinks you are confused.    You have a seizure.    Treatment:    Often, treatment for your migraine will take some time to make you headache stop.  Going home to sleep can be very effective.    Use  your medications as directed; overuse of medications can actually cause headaches.    Once your headache has gone away, avoid triggers such as certain foods, skipping meals, bright lights, changes in sleep, exercise and stress.    Migraine headaches can have symptoms before the pain starts, like vision changes, funny smells/tastes, dizziness or other symptoms.    Treating a headache as soon as the first symptoms come on is very important and gives the best chance of stopping the headache.    If headaches are severe or frequent, you may need to start daily medication to prevent the headaches.    Carbon monoxide can cause headaches, so not burning things in your home is important.  Also get a carbon monoxide detector.    Some medications for migraines may raise your blood pressure, so use with caution if you have high blood pressure or heart problems.  If you were given a prescription for medicine here today, be sure to read all of the information (including the package insert) that comes with your prescription.  This will include important information about the medicine, its side effects, and any warnings that you need to know about.  The pharmacist who fills the prescription can provide more information and answer questions you may have about the medicine.  If you have questions or concerns that the pharmacist cannot address, please call or return to the Emergency Department.   Remember that you can always come back to the Emergency Department if you are not able to see your regular provider in the amount of time listed above, if you get any new symptoms, or if there is anything that worries you.      24 Hour Appointment Hotline       To make an appointment at any [facility], call [phone] ([phone]). If you don't have a family doctor or clinic, we will help you find one. [facility] are conveniently located to serve the needs of you and your family.             Review of your medicines       Our records show that you are taking the medicines listed below. If these are incorrect, please call your family doctor or clinic.        Dose / Directions Last dose taken    BUSPIRONE HCL PO        Refills:  0        CYMBALTA 60 MG EC capsule   Dose:  120 mg   Generic drug:  DULoxetine        Take 120 mg by mouth daily   Refills:  0        diphenhydrAMINE 25 MG tablet   Commonly known as:  BENADRYL   Quantity:  30 tablet        Take 1-2 tablets up to every 6 hours as needed for facial movements.   Refills:  0        DOXYCYCLINE HYCLATE PO        Refills:  0        FLEXERIL PO        Refills:  0        fluticasone 50 MCG/ACT spray   Commonly known as:  FLONASE   Dose:  1-2 spray   Quantity:  1 Package        Spray 1-2 sprays into both nostrils daily as needed.   Refills:  2        hydrOXYzine 25 MG tablet   Commonly known as:  ATARAX   Dose:   mg   Quantity:  30 tablet        Take 2-4 tablets ( mg) by mouth nightly as needed for itching   Refills:  1        ibuprofen 200 MG tablet   Commonly known as:  ADVIL/MOTRIN   Dose:  1-2 tablet        Take 1-2 tablets by mouth. Every 4 to 6 hours as needed.   Refills:  0        IRON SUPPLEMENT PO   Dose:  65 mg        Take 65 mg by mouth daily (with breakfast)   Refills:  0        levETIRAcetam 500 MG tablet   Commonly known as:  KEPPRA   Dose:  1000 mg   Quantity:  360 tablet        Take 2 tablets (1,000 mg) by mouth 2 times daily   Refills:  11        levothyroxine 50 MCG tablet   Commonly known as:  SYNTHROID   Dose:  50 mcg   Quantity:  90 tablet        Take 1 tablet by mouth daily.   Refills:  1        MELATONIN PO        Refills:  0        METRONIDAZOLE PO   Dose:  500 mg        Take 500 mg by mouth 2 times daily   Refills:  0        Multi-vitamin Tabs tablet   Dose:  1 tablet        Take 1 tablet by mouth daily   Refills:  0        NEXIUM PO        Take by mouth 2 times daily   Refills:  0        omega-3 fatty acids 1200 MG capsule   Dose:  1 capsule         Take 1 capsule by mouth daily.   Refills:  0        PROZAC PO   Dose:  20 mg        Take 20 mg by mouth daily   Refills:  0        TORADOL IM   Dose:  1 mL        Inject 1 mL into the muscle as needed   Refills:  0        TYLENOL 500 MG tablet   Dose:  1-2 tablet   Generic drug:  acetaminophen        Take 1-2 tablets by mouth every 6 hours as needed.   Refills:  0        ZANTAC PO        Refills:  0        ZOFRAN 8 MG tablet   Dose:  8 mg   Generic drug:  ondansetron        Take 8 mg by mouth every 8 hours as needed for nausea   Refills:  0                Orders Needing Specimen Collection     None      Pending Results     No orders found from 12/19/2017 to 12/22/2017.            Pending Culture Results     No orders found from 12/19/2017 to 12/22/2017.            Pending Results Instructions     If you had any lab results that were not finalized at the time of your Discharge, you can call the ED Lab Result RN at [phone]. You will be contacted by this team for any positive Lab results or changes in treatment. The nurses are available 7 days a week from 10A to 6:30P.  You can leave a message 24 hours per day and they will return your call.        Test Results From Your Hospital Stay               Clinical Quality Measure: Blood Pressure Screening     Your blood pressure was checked while you were in the emergency department today. The last reading we obtained was  BP: 136/78 . Please read the guidelines below about what these numbers mean and what you should do about them.  If your systolic blood pressure (the top number) is less than 120 and your diastolic blood pressure (the bottom number) is less than 80, then your blood pressure is normal. There is nothing more that you need to do about it.  If your systolic blood pressure (the top number) is 120-139 or your diastolic blood pressure (the bottom number) is 80-89, your blood pressure may be higher than it should be. You should have your blood pressure  "rechecked within a year by a primary care provider.  If your systolic blood pressure (the top number) is 140 or greater or your diastolic blood pressure (the bottom number) is 90 or greater, you may have high blood pressure. High blood pressure is treatable, but if left untreated over time it can put you at risk for heart attack, stroke, or kidney failure. You should have your blood pressure rechecked by a primary care provider within the next 4 weeks.  If your provider in the emergency department today gave you specific instructions to follow-up with your doctor or provider even sooner than that, you should follow that instruction and not wait for up to 4 weeks for your follow-up visit.        Thank you for choosing [facility]       Thank you for choosing [facility] for your care. Our goal is always to provide you with excellent care. Hearing back from our patients is one way we can continue to improve our services. Please take a few minutes to complete the written survey that you may receive in the mail after you visit with us. Thank you!        [employer] Information     [employer] lets you send messages to your doctor, view your test results, renew your prescriptions, schedule appointments and more. To sign up, go to [email or web address] . Click on \"Log in\" on the left side of the screen, which will take you to the Welcome page. Then click on \"Sign up Now\" on the right side of the page.     You will be asked to enter the access code listed below, as well as some personal information. Please follow the directions to create your username and password.     Your access code is: [email or web address]  Expires: 3/21/2018  9:11 PM     Your access code will  in 90 days. If you need help or a new code, please call your [facility] clinic or [phone].        Care EveryWhere ID     This is your Care EveryWhere ID. This could be used by other organizations to access your [facility] medical records  [id number]        Equal Access to " Services     [facility]: Francis Lemus, waashleeda luqadaha, qaybta kamalinda oliva. So [facility] [phone].    ATENCIÓN: Si habla español, tiene a arellano disposición servicios gratuitos de asistencia lingüística. Llame al [phone].    We comply with applicable federal civil rights laws and [place] laws. We do not discriminate on the basis of race, color, national origin, age, disability, sex, sexual orientation, or gender identity.            After Visit Summary       This is your record. Keep this with you and show to your community pharmacist(s) and doctor(s) at your next visit.

## 2017-12-22 NOTE — ED NOTES
Writer inserted IV successfully. Patient complains of burning around IV site. IV site was assessed, no signs of warm, redness, or swelling where visible. IV flushes and blood is returned. Patient was given the option for a new IV to be inserted. Patient questing new IV and new nurse to attempt placement.

## 2017-12-22 NOTE — DISCHARGE INSTRUCTIONS
Discharge Instructions  Migraine    You were seen today for a headache that your provider thinks is likely a migraine. At this time your provider does not find that your headache is a sign of anything dangerous or life-threatening.  However, sometimes the signs of serious illness do not show up right away.      Generally, every Emergency Department visit should have a follow-up clinic visit with either a primary or a specialty clinic/provider. Please follow-up as instructed by your emergency provider today.    Return to the Emergency Department if:    You get a fever of 100.4 F or higher.    You get a stiff neck with your headache.    You get a new headache that is different or worse than headaches you have had before.    You are vomiting (throwing up) and cannot keep food or water down.    You have blurry or double vision or other problems with your eyes.    You have a new weakness on one side of your body.    You have difficulty with balance which is new.    You or your family thinks you are confused.    You have a seizure.    Treatment:    Often, treatment for your migraine will take some time to make you headache stop.  Going home to sleep can be very effective.    Use your medications as directed; overuse of medications can actually cause headaches.    Once your headache has gone away, avoid triggers such as certain foods, skipping meals, bright lights, changes in sleep, exercise and stress.    Migraine headaches can have symptoms before the pain starts, like vision changes, funny smells/tastes, dizziness or other symptoms.    Treating a headache as soon as the first symptoms come on is very important and gives the best chance of stopping the headache.    If headaches are severe or frequent, you may need to start daily medication to prevent the headaches.    Carbon monoxide can cause headaches, so not burning things in your home is important.  Also get a carbon monoxide detector.    Some medications for  migraines may raise your blood pressure, so use with caution if you have high blood pressure or heart problems.  If you were given a prescription for medicine here today, be sure to read all of the information (including the package insert) that comes with your prescription.  This will include important information about the medicine, its side effects, and any warnings that you need to know about.  The pharmacist who fills the prescription can provide more information and answer questions you may have about the medicine.  If you have questions or concerns that the pharmacist cannot address, please call or return to the Emergency Department.   Remember that you can always come back to the Emergency Department if you are not able to see your regular provider in the amount of time listed above, if you get any new symptoms, or if there is anything that worries you.

## 2017-12-22 NOTE — ED NOTES
"Patient reports only minimal improvement in headache moses despite medications. Patient states \"there has to be something else the doctor can give me\". MD made aware.   "

## 2018-02-15 ENCOUNTER — HOSPITAL ENCOUNTER (EMERGENCY)
Facility: CLINIC | Age: 43
Discharge: HOME | End: 2018-02-15
Attending: EMERGENCY MEDICINE | Admitting: EMERGENCY MEDICINE
Payer: MEDICARE

## 2018-02-15 VITALS
HEART RATE: 99 BPM | OXYGEN SATURATION: 100 % | DIASTOLIC BLOOD PRESSURE: 88 MMHG | SYSTOLIC BLOOD PRESSURE: 153 MMHG | TEMPERATURE: 98.3 F | RESPIRATION RATE: 16 BRPM

## 2018-02-15 DIAGNOSIS — G24.9 DYSTONIA: ICD-10-CM

## 2018-02-15 PROCEDURE — 25000128 H RX IP 250 OP 636: Performed by: EMERGENCY MEDICINE

## 2018-02-15 PROCEDURE — 96374 THER/PROPH/DIAG INJ IV PUSH: CPT

## 2018-02-15 PROCEDURE — 96375 TX/PRO/DX INJ NEW DRUG ADDON: CPT

## 2018-02-15 PROCEDURE — 99284 EMERGENCY DEPT VISIT MOD MDM: CPT | Mod: 25

## 2018-02-15 RX ORDER — BENZTROPINE MESYLATE 1 MG/1
1 TABLET ORAL 2 TIMES DAILY
Qty: 6 TABLET | Refills: 0 | Status: SHIPPED | OUTPATIENT
Start: 2018-02-15 | End: 2018-03-20

## 2018-02-15 RX ORDER — BENZTROPINE MESYLATE 1 MG/ML
2 INJECTION, SOLUTION INTRAMUSCULAR; INTRAVENOUS ONCE
Status: COMPLETED | OUTPATIENT
Start: 2018-02-15 | End: 2018-02-15

## 2018-02-15 RX ORDER — DIPHENHYDRAMINE HYDROCHLORIDE 50 MG/ML
25 INJECTION INTRAMUSCULAR; INTRAVENOUS ONCE
Status: COMPLETED | OUTPATIENT
Start: 2018-02-15 | End: 2018-02-15

## 2018-02-15 RX ADMIN — BENZTROPINE MESYLATE 2 MG: 1 INJECTION INTRAMUSCULAR; INTRAVENOUS at 20:55

## 2018-02-15 RX ADMIN — DIPHENHYDRAMINE HYDROCHLORIDE 25 MG: 50 INJECTION, SOLUTION INTRAMUSCULAR; INTRAVENOUS at 22:30

## 2018-02-15 ASSESSMENT — ENCOUNTER SYMPTOMS: SPEECH DIFFICULTY: 1

## 2018-02-15 NOTE — ED AVS SNAPSHOT
[facility] Emergency Department    [address] [place] [address]    Phone:  [phone]    Fax:  [phone]                                       Iva Valencia   MRN: [id number]    Department:  [facility] Emergency Department   Date of Visit:  2/15/2018           Patient Information     Date Of Birth          1975        Your diagnoses for this visit were:     Dystonia        You were seen by Leon Pappas DO.      Follow-up Information     Follow up with [facility]. Call in 2 days.    Why:  As needed    Contact information:    [place] [facility]  [address] [place] [address]  [phone]          Follow up with [facility] Emergency Department.    Specialty:  EMERGENCY MEDICINE    Why:  If symptoms worsen    Contact information:    [address] [place] [address]  [phone]        Discharge Instructions         Drug Reaction: Dystonic  You are having a muscular reaction to a medicine you have taken. This is not a very common reaction. It is most often caused by medicines given for nausea, seizures, or psychiatric issues. The reaction can happen fairly quickly after taking the medicine. It may occur after hours or even days, however. If untreated, the reaction lasts until the medicine is eliminated naturally from your body. This can take up to 3 days. Rarely, it can take significantly longer. However, you have been given medicines to help treat the reaction.  Symptoms may include the following:    Stiffening, tightness, spasm, or twisting of the muscles in the eyes, tongue, jaw, back, legs, or arms    Trouble speaking and swallowing    Trouble opening your mouth    Trouble moving your neck and head    Restless, jittery feeling throughout your whole body  Home care    You may eat and drink normally. Take your other prescribed medicines as directed. Avoid alcohol for the next 3 days.    Take  diphenhydramine or the medicines you were given for at least 2 days (48 hours). After 2 days, most of the medicine that caused the reaction should be eliminated from your body.    If symptoms return, take the medicines for the reaction for another 48 hours. If this does not help, or if you run out of medicine, contact your healthcare provider.    Unless specifically advised by your doctor, do not take the medicine that caused the reaction ever again. It may cause the same reaction in the future. If this medicine is needed to treat your condition and no substitutes exist, each dose can be taken along with the medicine to treat the reaction.    Every time you visit a healthcare provider or a hospital, tell him or her about your reaction to this medicine.  Prevention    Most dystonic reactions are due to a class of medicines called phenothiazines. Some antinausea medicines and some tranquilizers are in this class. If you have reacted to one medicine drug in this class, any medicine in this class will probably cause the same reaction. Other medicines that may cause this reaction include metoclopramide, some anesthetics, and some street drugs.    Unless specifically advised by your doctor, do not take the medicine that caused the reaction ever again. It may cause the same reaction in the future. If this medicine is needed to treat your condition and no substitutes exist, each dose can be taken along with the medicine to treat the reaction.    Every time you visit a healthcare provider or a hospital, tell him or her about your reaction to this medicine.  Follow-up care  Follow up with your healthcare provider or as advised.  When to seek medical advice  Call your healthcare provider right away if any of these occur:    Symptoms return and are not controlled by restarting the medicine you were given to treat the reaction.    Symptoms continue or require medicine for more than 3 days.  Call 911  Call emergency services  right away if any of these occur.    Trouble breathing or swallowing    Trouble speaking    Confusion    Extreme drowsiness or trouble awakening    Fainting or loss of consciousness    Rapid heart rate    Seizure  Date Last Reviewed: 3/1/2017    8408-3591 [employer]. [address] [place] [address], [place] [address]. All rights reserved. This information is not intended as a substitute for professional medical care. Always follow your healthcare professional's instructions.          24 Hour Appointment Hotline       To make an appointment at any [facility], call [phone] ([phone]). If you don't have a family doctor or clinic, we will help you find one. [facility] are conveniently located to serve the needs of you and your family.             Review of your medicines      START taking        Dose / Directions Last dose taken    benztropine 1 MG tablet   Commonly known as:  COGENTIN   Dose:  1 mg   Quantity:  6 tablet        Take 1 tablet (1 mg) by mouth 2 times daily for 3 days   Refills:  0          Our records show that you are taking the medicines listed below. If these are incorrect, please call your family doctor or clinic.        Dose / Directions Last dose taken    BUSPIRONE HCL PO        Refills:  0        CYMBALTA 60 MG EC capsule   Dose:  120 mg   Generic drug:  DULoxetine        Take 120 mg by mouth daily   Refills:  0        diphenhydrAMINE 25 MG tablet   Commonly known as:  BENADRYL   Quantity:  30 tablet        Take 1-2 tablets up to every 6 hours as needed for facial movements.   Refills:  0        DOXYCYCLINE HYCLATE PO        Refills:  0        FLEXERIL PO        Refills:  0        fluticasone 50 MCG/ACT spray   Commonly known as:  FLONASE   Dose:  1-2 spray   Quantity:  1 Package        Spray 1-2 sprays into both nostrils daily as needed.   Refills:  2        hydrOXYzine 25 MG tablet   Commonly known as:  ATARAX   Dose:   mg   Quantity:  30 tablet        Take 2-4  tablets ( mg) by mouth nightly as needed for itching   Refills:  1        ibuprofen 200 MG tablet   Commonly known as:  ADVIL/MOTRIN   Dose:  1-2 tablet        Take 1-2 tablets by mouth. Every 4 to 6 hours as needed.   Refills:  0        IRON SUPPLEMENT PO   Dose:  65 mg        Take 65 mg by mouth daily (with breakfast)   Refills:  0        levETIRAcetam 500 MG tablet   Commonly known as:  KEPPRA   Dose:  1000 mg   Quantity:  360 tablet        Take 2 tablets (1,000 mg) by mouth 2 times daily   Refills:  11        levothyroxine 50 MCG tablet   Commonly known as:  SYNTHROID   Dose:  50 mcg   Quantity:  90 tablet        Take 1 tablet by mouth daily.   Refills:  1        MELATONIN PO        Refills:  0        METRONIDAZOLE PO   Dose:  500 mg        Take 500 mg by mouth 2 times daily   Refills:  0        Multi-vitamin Tabs tablet   Dose:  1 tablet        Take 1 tablet by mouth daily   Refills:  0        NEXIUM PO        Take by mouth 2 times daily   Refills:  0        omega-3 fatty acids 1200 MG capsule   Dose:  1 capsule        Take 1 capsule by mouth daily.   Refills:  0        PROZAC PO   Dose:  20 mg        Take 20 mg by mouth daily   Refills:  0        TORADOL IM   Dose:  1 mL        Inject 1 mL into the muscle as needed   Refills:  0        TYLENOL 500 MG tablet   Dose:  1-2 tablet   Generic drug:  acetaminophen        Take 1-2 tablets by mouth every 6 hours as needed.   Refills:  0        ZANTAC PO        Refills:  0        ZOFRAN 8 MG tablet   Dose:  8 mg   Generic drug:  ondansetron        Take 8 mg by mouth every 8 hours as needed for nausea   Refills:  0                Prescriptions were sent or printed at these locations (1 Prescription)                   Other Prescriptions                Printed at Department/Unit printer (1 of 1)         benztropine (COGENTIN) 1 MG tablet                Procedures and tests performed during your visit     Peripheral IV catheter      Orders Needing Specimen Collection      None      Pending Results     No orders found from 2/13/2018 to 2/16/2018.            Pending Culture Results     No orders found from 2/13/2018 to 2/16/2018.            Pending Results Instructions     If you had any lab results that were not finalized at the time of your Discharge, you can call the ED Lab Result RN at [phone]. You will be contacted by this team for any positive Lab results or changes in treatment. The nurses are available 7 days a week from 10A to 6:30P.  You can leave a message 24 hours per day and they will return your call.        Test Results From Your Hospital Stay               Clinical Quality Measure: Blood Pressure Screening     Your blood pressure was checked while you were in the emergency department today. The last reading we obtained was  BP: 153/88 . Please read the guidelines below about what these numbers mean and what you should do about them.  If your systolic blood pressure (the top number) is less than 120 and your diastolic blood pressure (the bottom number) is less than 80, then your blood pressure is normal. There is nothing more that you need to do about it.  If your systolic blood pressure (the top number) is 120-139 or your diastolic blood pressure (the bottom number) is 80-89, your blood pressure may be higher than it should be. You should have your blood pressure rechecked within a year by a primary care provider.  If your systolic blood pressure (the top number) is 140 or greater or your diastolic blood pressure (the bottom number) is 90 or greater, you may have high blood pressure. High blood pressure is treatable, but if left untreated over time it can put you at risk for heart attack, stroke, or kidney failure. You should have your blood pressure rechecked by a primary care provider within the next 4 weeks.  If your provider in the emergency department today gave you specific instructions to follow-up with your doctor or provider even sooner than that, you  "should follow that instruction and not wait for up to 4 weeks for your follow-up visit.        Thank you for choosing [facility]       Thank you for choosing [facility] for your care. Our goal is always to provide you with excellent care. Hearing back from our patients is one way we can continue to improve our services. Please take a few minutes to complete the written survey that you may receive in the mail after you visit with us. Thank you!        [employer] Information     OrderUp lets you send messages to your doctor, view your test results, renew your prescriptions, schedule appointments and more. To sign up, go to [email or web address] . Click on \"Log in\" on the left side of the screen, which will take you to the Welcome page. Then click on \"Sign up Now\" on the right side of the page.     You will be asked to enter the access code listed below, as well as some personal information. Please follow the directions to create your username and password.     Your access code is: [email or web address]  Expires: 3/21/2018  9:11 PM     Your access code will  in 90 days. If you need help or a new code, please call your [facility] clinic or [phone].        Care EveryWhere ID     This is your Care EveryWhere ID. This could be used by other organizations to access your [facility] medical records  [id number]        Equal Access to Services     KARSON TURNER AH: Hadhcamp Lemus, waaxda luqadaha, qaybta kaalmada benigno, malinda hays . So [facility] [phone].    ATENCIÓN: Si habla español, tiene a arellano disposición servicios gratuitos de asistencia lingüística. Llame al [phone].    We comply with applicable federal civil rights laws and [place] laws. We do not discriminate on the basis of race, color, national origin, age, disability, sex, sexual orientation, or gender identity.            After Visit Summary       This is your record. Keep this with you and show to your community " pharmacist(s) and doctor(s) at your next visit.

## 2018-02-15 NOTE — ED AVS SNAPSHOT
[facility] Emergency Department    [address]    [facility] [address]    Phone:  [phone]    Fax:  [phone]                                       Iva Valencia   MRN: [id number]    Department:  [facility] Emergency Department   Date of Visit:  2/15/2018           After Visit Summary Signature Page     I have received my discharge instructions, and my questions have been answered. I have discussed any challenges I see with this plan with the nurse or doctor.    ..........................................................................................................................................  Patient/Patient Representative Signature      ..........................................................................................................................................  Patient Representative Print Name and Relationship to Patient    ..................................................               ................................................  Date                                            Time    ..........................................................................................................................................  Reviewed by Signature/Title    ...................................................              ..............................................  Date                                                            Time

## 2018-02-16 ASSESSMENT — ENCOUNTER SYMPTOMS
SHORTNESS OF BREATH: 1
FEVER: 0

## 2018-02-16 NOTE — ED PROVIDER NOTES
History     Chief Complaint:  Numbness    The history is provided by the patient.      Iva Valencia is a 43 year old female with a history of dystonic reactions who presents to the emergency department today for evaluation of difficulty speaking. The patient reports she is having a dystonic reaction with associated facial swelling and speech difficulty, prompting her to present to the emergency department. She discontinued taking one of her medications about 1 1/2 weeks ago: Rexulti (brexpiprazole). She denies any other injuries or illnesses. She notes intermittent symptoms like this for 2-3 months.    Allergies:  Ceclor [Cephalosporins]  Claritin [Loratadine]  Compazine  Droperidol  Penicillins  Phenergan [Promethazine Hcl]  Prednisone  Propranolol  Prozac [Fluoxetine]  Reglan [Metoclopramide]  Sumatriptan  Tessalon Perles [Benzonatate]  Zyprexa [Olanzapine]  Levothyroxine    Medications:    DOXYCYCLINE HYCLATE PO  RaNITidine HCl (ZANTAC PO)  BUSPIRONE HCL PO  Cyclobenzaprine HCl (FLEXERIL PO)  MELATONIN PO  hydrOXYzine (ATARAX) 25 MG tablet  levETIRAcetam (KEPPRA) 500 MG tablet  METRONIDAZOLE PO  ondansetron (ZOFRAN) 8 MG tablet  Ketorolac Tromethamine (TORADOL IM)  levothyroxine (SYNTHROID) 50 MCG tablet    Past Medical History:    Anemia   Anxiety   Depressive disorder   Fibromyalgia   Gastro-oesophageal reflux disease   Headache(784.0)   Herpes simplex without mention of complication   Seizures    Tendonitis   Thyroid disease    Past Surgical History:    CHOLECYSTECTOMY, LAPOROSCOPIC  CRYOTHERAPY, CERVICAL  D & C  PE TUBES  TONSILLECTOMY     Family History:    History reviewed. No pertinent family history.     Social History:  The patient was accompanied to the ED by her .  Smoking Status: former  Smokeless Tobacco: never  Alcohol Use: yes  Marital Status:        Review of Systems   Constitutional: Negative for fever.   Respiratory: Positive for shortness of breath.    Neurological: Positive  for speech difficulty.   All other systems reviewed and are negative.    Physical Exam   First Vitals: BP (!) 168/104  Pulse 99  Temp 98.3  F (36.8  C) (Oral)  Resp 18  SpO2 99%    Physical Exam  Constitutional: Patient appears well-developed and well-nourished. There is minimal distress.   HENT:   Head: No external signs of trauma. No lesions noted.  Eyes: PEERL, EOMI B/L, no pain or limitation of superior gaze  Nose: Noncongested, no exudates. No rhinorrhea. No FB noted  Mouth/Throat:    Mucous membranes are moist and normal.    No Oropharyngeal exudate. No oropharyngeal erythema noted.   No tonsilar swelling noted.    No uvular deviation or swelling noted.   No swelling noted on the floor of the mouth  Neck:  No posterior cervical LAD noted. FROM. Neck is supple. No stridor noted.  Cardiovascular: Normal rate, regular rhythm and normal heart sounds.  Exam reveals no gallop and no friction rub.  No murmur heard. Equal B/L peripheral pulses.  Pulmonary/Chest: Effort normal and breath sounds normal. No respiratory distress. Patient has no wheezes. Patient has no rales.   Abdominal: Soft. There is no tenderness.   Extremities: No edema noted  Neurological: Patient is alert and oriented to person, place, and time. There is some lip twitching c/w a dystonic reaction  Skin: Skin is warm and dry. There is no diaphoresis noted.     Emergency Department Course     Interventions:  2055 Cogentin 2 mg IV  2230 Benadryl 25 mg IV     Emergency Department Course:  Nursing notes and vitals reviewed.  I performed an exam of the patient as documented above.     Patient rechecked and updated.     2224: Feels better. I discussed the plan with the patient and she consented to discharge.    Impression & Plan      Medical Decision Making:  This 43-year-old female patient presents to ER due to concerns for dystonic reaction.  Please see the HPI and exam for specifics.  The patient believes that she stopped taking Rexulti about 1-1/2  weeks ago.  She thinks that she is actually had 2-3 months of intermittent dystonic symptoms though.  She had good improvement with Cogentin in the ER and I will discharge her home with 3 days of the same medication and encouraged her to follow closely with her psychiatrist and primary care physician in the outpatient setting.  Anticipatory guidance given prior to discharge.    Diagnosis:      1. Dystonia     Disposition:  discharged to home    Discharge Medication List as of 2/15/2018 10:46 PM   START taking these medications    Details   benztropine (COGENTIN) 1 MG tablet Take 1 tablet (1 mg) by mouth 2 times daily for 3 days, Disp-6 tablet, R-0, Local Print        Scribe Disclosure:  I, Michele Gao, am serving as a scribe at 8:13 PM on 2/15/2018 to document services personally performed by Leon Pappas DO based on my observations and the provider's statements to me.     2/15/2018   [facility] EMERGENCY DEPARTMENT       Leon Pappas DO  02/16/18 0044

## 2018-02-16 NOTE — DISCHARGE INSTRUCTIONS
Drug Reaction: Dystonic  You are having a muscular reaction to a medicine you have taken. This is not a very common reaction. It is most often caused by medicines given for nausea, seizures, or psychiatric issues. The reaction can happen fairly quickly after taking the medicine. It may occur after hours or even days, however. If untreated, the reaction lasts until the medicine is eliminated naturally from your body. This can take up to 3 days. Rarely, it can take significantly longer. However, you have been given medicines to help treat the reaction.  Symptoms may include the following:    Stiffening, tightness, spasm, or twisting of the muscles in the eyes, tongue, jaw, back, legs, or arms    Trouble speaking and swallowing    Trouble opening your mouth    Trouble moving your neck and head    Restless, jittery feeling throughout your whole body  Home care    You may eat and drink normally. Take your other prescribed medicines as directed. Avoid alcohol for the next 3 days.    Take diphenhydramine or the medicines you were given for at least 2 days (48 hours). After 2 days, most of the medicine that caused the reaction should be eliminated from your body.    If symptoms return, take the medicines for the reaction for another 48 hours. If this does not help, or if you run out of medicine, contact your healthcare provider.    Unless specifically advised by your doctor, do not take the medicine that caused the reaction ever again. It may cause the same reaction in the future. If this medicine is needed to treat your condition and no substitutes exist, each dose can be taken along with the medicine to treat the reaction.    Every time you visit a healthcare provider or a hospital, tell him or her about your reaction to this medicine.  Prevention    Most dystonic reactions are due to a class of medicines called phenothiazines. Some antinausea medicines and some tranquilizers are in this class. If you have reacted to  one medicine drug in this class, any medicine in this class will probably cause the same reaction. Other medicines that may cause this reaction include metoclopramide, some anesthetics, and some street drugs.    Unless specifically advised by your doctor, do not take the medicine that caused the reaction ever again. It may cause the same reaction in the future. If this medicine is needed to treat your condition and no substitutes exist, each dose can be taken along with the medicine to treat the reaction.    Every time you visit a healthcare provider or a hospital, tell him or her about your reaction to this medicine.  Follow-up care  Follow up with your healthcare provider or as advised.  When to seek medical advice  Call your healthcare provider right away if any of these occur:    Symptoms return and are not controlled by restarting the medicine you were given to treat the reaction.    Symptoms continue or require medicine for more than 3 days.  Call 911  Call emergency services right away if any of these occur.    Trouble breathing or swallowing    Trouble speaking    Confusion    Extreme drowsiness or trouble awakening    Fainting or loss of consciousness    Rapid heart rate    Seizure  Date Last Reviewed: 3/1/2017    9479-4896 [employer]. [address] [place] [address]. All rights reserved. This information is not intended as a substitute for professional medical care. Always follow your healthcare professional's instructions.

## 2018-02-17 ENCOUNTER — APPOINTMENT (OUTPATIENT)
Dept: GENERAL RADIOLOGY | Facility: CLINIC | Age: 43
End: 2018-02-17
Attending: EMERGENCY MEDICINE
Payer: MEDICARE

## 2018-02-17 ENCOUNTER — HOSPITAL ENCOUNTER (EMERGENCY)
Facility: CLINIC | Age: 43
Discharge: HOME | End: 2018-02-17
Attending: EMERGENCY MEDICINE | Admitting: EMERGENCY MEDICINE
Payer: MEDICARE

## 2018-02-17 VITALS
DIASTOLIC BLOOD PRESSURE: 86 MMHG | OXYGEN SATURATION: 99 % | RESPIRATION RATE: 18 BRPM | SYSTOLIC BLOOD PRESSURE: 142 MMHG | HEART RATE: 88 BPM | TEMPERATURE: 99.1 F

## 2018-02-17 DIAGNOSIS — S80.01XA CONTUSION OF RIGHT KNEE, INITIAL ENCOUNTER: ICD-10-CM

## 2018-02-17 DIAGNOSIS — S63.276A CLOSED DISLOCATION OF INTERPHALANGEAL JOINT OF RIGHT LITTLE FINGER: ICD-10-CM

## 2018-02-17 PROCEDURE — 73140 X-RAY EXAM OF FINGER(S): CPT | Mod: RT

## 2018-02-17 PROCEDURE — 99285 EMERGENCY DEPT VISIT HI MDM: CPT | Mod: 25

## 2018-02-17 PROCEDURE — 96375 TX/PRO/DX INJ NEW DRUG ADDON: CPT

## 2018-02-17 PROCEDURE — 25000128 H RX IP 250 OP 636: Performed by: EMERGENCY MEDICINE

## 2018-02-17 PROCEDURE — 26700 TREAT KNUCKLE DISLOCATION: CPT | Mod: F9

## 2018-02-17 PROCEDURE — 96374 THER/PROPH/DIAG INJ IV PUSH: CPT

## 2018-02-17 PROCEDURE — 73562 X-RAY EXAM OF KNEE 3: CPT | Mod: RT

## 2018-02-17 PROCEDURE — 73110 X-RAY EXAM OF WRIST: CPT | Mod: RT

## 2018-02-17 PROCEDURE — 73120 X-RAY EXAM OF HAND: CPT | Mod: 50

## 2018-02-17 RX ORDER — BUPIVACAINE HYDROCHLORIDE 5 MG/ML
INJECTION, SOLUTION PERINEURAL
Status: DISCONTINUED
Start: 2018-02-17 | End: 2018-02-18 | Stop reason: HOSPADM

## 2018-02-17 RX ORDER — LORAZEPAM 2 MG/ML
0.5 INJECTION INTRAMUSCULAR ONCE
Status: COMPLETED | OUTPATIENT
Start: 2018-02-17 | End: 2018-02-17

## 2018-02-17 RX ORDER — KETOROLAC TROMETHAMINE 30 MG/ML
30 INJECTION, SOLUTION INTRAMUSCULAR; INTRAVENOUS ONCE
Status: COMPLETED | OUTPATIENT
Start: 2018-02-17 | End: 2018-02-17

## 2018-02-17 RX ORDER — ONDANSETRON 4 MG/1
4 TABLET, ORALLY DISINTEGRATING ORAL EVERY 8 HOURS PRN
Qty: 10 TABLET | Refills: 0 | Status: ON HOLD | OUTPATIENT
Start: 2018-02-17 | End: 2019-02-13

## 2018-02-17 RX ORDER — HYDROCODONE BITARTRATE AND ACETAMINOPHEN 5; 325 MG/1; MG/1
1-2 TABLET ORAL EVERY 4 HOURS PRN
Qty: 15 TABLET | Refills: 0 | Status: SHIPPED | OUTPATIENT
Start: 2018-02-17 | End: 2019-02-12

## 2018-02-17 RX ADMIN — LORAZEPAM 0.5 MG: 2 INJECTION INTRAMUSCULAR; INTRAVENOUS at 20:22

## 2018-02-17 RX ADMIN — KETOROLAC TROMETHAMINE 30 MG: 30 INJECTION, SOLUTION INTRAMUSCULAR at 20:22

## 2018-02-17 ASSESSMENT — ENCOUNTER SYMPTOMS
NERVOUS/ANXIOUS: 1
ABDOMINAL PAIN: 0
VOMITING: 0
TREMORS: 1
WOUND: 1
COLOR CHANGE: 0
NAUSEA: 0
HEADACHES: 0
BACK PAIN: 0
JOINT SWELLING: 0
NECK PAIN: 0

## 2018-02-17 NOTE — ED AVS SNAPSHOT
[facility] Emergency Department    [address]    [facility] [address]    Phone:  [phone]    Fax:  [phone]                                       Iva Valencia   MRN: [id number]    Department:  [facility] Emergency Department   Date of Visit:  2/17/2018           After Visit Summary Signature Page     I have received my discharge instructions, and my questions have been answered. I have discussed any challenges I see with this plan with the nurse or doctor.    ..........................................................................................................................................  Patient/Patient Representative Signature      ..........................................................................................................................................  Patient Representative Print Name and Relationship to Patient    ..................................................               ................................................  Date                                            Time    ..........................................................................................................................................  Reviewed by Signature/Title    ...................................................              ..............................................  Date                                                            Time

## 2018-02-17 NOTE — ED AVS SNAPSHOT
[facility]    [address] [place] [address]    Phone:  [phone]    Fax:  [phone]                                       Iva Valencia   MRN: [id number]    Department:  [facility]   Date of Visit:  2/17/2018           Patient Information     Date Of Birth          1975        Your diagnoses for this visit were:     Closed dislocation of interphalangeal joint of right little finger     Contusion of right knee, initial encounter        You were seen by Jamar Milton MD.      Follow-up Information     Follow up with Mercedes Montes MD In 3 days.    Specialty:  Orthopedics    Why:  to recehck your dislocated finger    Contact information:    [facility]  [address] [place] [id number]  [phone]          Discharge Instructions         Finger Dislocation  A finger dislocation occurs when the tissues, or ligaments, that hold the joint together are torn. The bones then move apart, or are dislocated, out of their normal position. This causes pain, swelling, and bruising. Sometimes there is also a small chip fracture. Once the joint is put back into place again, it will take about 6 weeks for the ligaments to heal. During this time, you should protect your finger from re-injury.     Blayne taping is a way to secure your injured finger to the one next to it. Blayne taping allows the joint to move. But it also protects it from dislocating again. Blayne tape can be left in place for up to 6 weeks.  Hand exercises may be prescribed at your follow-up visit. These can help speed healing and maintain function. In most cases you will regain full function of your finger. But it may take 12 to 18 months before all mild pain and swelling goes away and full function returns.  Home care    Keep your hand raised, or elevated, to reduce pain and swelling. When sitting or lying down, raise your arm  above the level of your heart. You can do this by placing your arm on a pillow that rests on your chest. Or your arm can be on a pillow at your side. This is most important during the first 48 hours after injury.    Put an ice pack on the injured area for no more than 15 to 20 minutes every 3 to 6 hours. Do this for the first 24 to 48 hours. You can make an ice pack by wrapping a plastic Ziploc bag of ice cubes in a thin towel. As the ice melts, be careful that the tape, gauze, or splint doesn t get wet. After that, keep using ice as needed to ease pain and swelling.    If you have a removable splint, you may take it off to bathe and then put it back on. If you have a permanent splint, cover your entire hand with 2 plastic bags. Place 1 bag around the other. Tape each bag with duct tape at the top end. Water can still leak in even when your hand is covered. So it's best to keep the splint away from water. If a splint gets wet, you can dry it with a hair-dryer on a cool setting.     If you use buddy tape and it becomes wet or dirty, change it. You may replace it with paper, plastic, or cloth tape. Cloth tape and paper tapes must be kept dry. When re-applying buddy tape, use gauze or cotton padding between your fingers. This will prevent the skin from getting moist and breaking down, or macerating. It is very important to put padding at the web space. This is the small piece of skin that joins the bases of your fingers. Keep the buddy tape in place as instructed by your healthcare provider.    You may use over-the-counter pain medicine to control pain, unless another pain medicine was prescribed. Talk with your provider before taking these medicines if you have chronic liver or kidney disease. Also talk with your provider if you have ever had a stomach ulcer or GI (gastrointestinal) bleeding.    Do not play sports or do any physical exercise until your healthcare provider says that you can.  Follow-up care  Follow up  with your healthcare provider in 1 week, or as advised. Splints should generally not be left in place longer than 3 weeks to avoid stiffness and loss of joint function. It is important that you see the referral doctor. This provider can determine how long to keep your splint in place and when to begin hand exercises.  If X-rays were taken, you will be told of any new findings that may affect your care.  When to seek medical advice  Call your healthcare provider right away if any of the following occur:    The injured finger has more pain or swelling    The injured finger becomes red or warm    The injured finger becomes cold, blue, numb, or tingly  Date Last Reviewed: 11/23/2015 2000-2017 [employer]. [address] [place] [address], [place] [address]. All rights reserved. This information is not intended as a substitute for professional medical care. Always follow your healthcare professional's instructions.          24 Hour Appointment Hotline       To make an appointment at any [facility], call [phone] ([phone]). If you don't have a family doctor or clinic, we will help you find one. [facility] are conveniently located to serve the needs of you and your family.             Review of your medicines      START taking        Dose / Directions Last dose taken    HYDROcodone-acetaminophen 5-325 MG per tablet   Commonly known as:  NORCO   Dose:  1-2 tablet   Quantity:  15 tablet        Take 1-2 tablets by mouth every 4 hours as needed for moderate to severe pain   Refills:  0        ondansetron 4 MG ODT tab   Commonly known as:  ZOFRAN ODT   Dose:  4 mg   Quantity:  10 tablet        Take 1 tablet (4 mg) by mouth every 8 hours as needed for nausea   Refills:  0          Our records show that you are taking the medicines listed below. If these are incorrect, please call your family doctor or clinic.        Dose / Directions Last dose taken    benztropine 1 MG tablet   Commonly known as:   COGENTIN   Dose:  1 mg   Quantity:  6 tablet        Take 1 tablet (1 mg) by mouth 2 times daily for 3 days   Refills:  0        BUSPIRONE HCL PO        Refills:  0        CYMBALTA 60 MG EC capsule   Dose:  120 mg   Generic drug:  DULoxetine        Take 120 mg by mouth daily   Refills:  0        diphenhydrAMINE 25 MG tablet   Commonly known as:  BENADRYL   Quantity:  30 tablet        Take 1-2 tablets up to every 6 hours as needed for facial movements.   Refills:  0        DOXYCYCLINE HYCLATE PO        Refills:  0        FLEXERIL PO        Refills:  0        fluticasone 50 MCG/ACT spray   Commonly known as:  FLONASE   Dose:  1-2 spray   Quantity:  1 Package        Spray 1-2 sprays into both nostrils daily as needed.   Refills:  2        hydrOXYzine 25 MG tablet   Commonly known as:  ATARAX   Dose:   mg   Quantity:  30 tablet        Take 2-4 tablets ( mg) by mouth nightly as needed for itching   Refills:  1        ibuprofen 200 MG tablet   Commonly known as:  ADVIL/MOTRIN   Dose:  1-2 tablet        Take 1-2 tablets by mouth. Every 4 to 6 hours as needed.   Refills:  0        IRON SUPPLEMENT PO   Dose:  65 mg        Take 65 mg by mouth daily (with breakfast)   Refills:  0        levETIRAcetam 500 MG tablet   Commonly known as:  KEPPRA   Dose:  1000 mg   Quantity:  360 tablet        Take 2 tablets (1,000 mg) by mouth 2 times daily   Refills:  11        levothyroxine 50 MCG tablet   Commonly known as:  SYNTHROID   Dose:  50 mcg   Quantity:  90 tablet        Take 1 tablet by mouth daily.   Refills:  1        MELATONIN PO        Refills:  0        METRONIDAZOLE PO   Dose:  500 mg        Take 500 mg by mouth 2 times daily   Refills:  0        Multi-vitamin Tabs tablet   Dose:  1 tablet        Take 1 tablet by mouth daily   Refills:  0        NEXIUM PO        Take by mouth 2 times daily   Refills:  0        omega-3 fatty acids 1200 MG capsule   Dose:  1 capsule        Take 1 capsule by mouth daily.   Refills:  0         PROZAC PO   Dose:  20 mg        Take 20 mg by mouth daily   Refills:  0        TORADOL IM   Dose:  1 mL        Inject 1 mL into the muscle as needed   Refills:  0        TYLENOL 500 MG tablet   Dose:  1-2 tablet   Generic drug:  acetaminophen        Take 1-2 tablets by mouth every 6 hours as needed.   Refills:  0        ZANTAC PO        Refills:  0        ZOFRAN 8 MG tablet   Dose:  8 mg   Generic drug:  ondansetron        Take 8 mg by mouth every 8 hours as needed for nausea   Refills:  0                Prescriptions were sent or printed at these locations (2 Prescriptions)                   Other Prescriptions                Printed at Department/Unit printer (2 of 2)         HYDROcodone-acetaminophen (NORCO) 5-325 MG per tablet               ondansetron (ZOFRAN ODT) 4 MG ODT tab                Procedures and tests performed during your visit     Fingers XR, 2-3 views, right    Knee XR, 3 views, right    Wrist XR, G/E 3 views, right    XR Hand Bilateral 2 Views      Orders Needing Specimen Collection     None      Pending Results     No orders found from 2/15/2018 to 2/18/2018.            Pending Culture Results     No orders found from 2/15/2018 to 2/18/2018.            Pending Results Instructions     If you had any lab results that were not finalized at the time of your Discharge, you can call the ED Lab Result RN at [phone]. You will be contacted by this team for any positive Lab results or changes in treatment. The nurses are available 7 days a week from 10A to 6:30P.  You can leave a message 24 hours per day and they will return your call.        Test Results From Your Hospital Stay        2/17/2018  9:56 PM      Narrative     XR FINGER RT G/E 1 VW 2/17/2018 8:35 PM     HISTORY: fall, right hand pain, 5th finger deformity;         Impression     IMPRESSION: Single lateral view demonstrates a dorsal dislocation at  the proximal interphalangeal joint of what is presumed to be the fifth  finger.  There appears to be flexor tendon avulsion fracture at the  base of the middle phalanx, the fracture fragment proximally retracted  adjacent to the volar aspect of the proximal phalangeal head.    ALEE THAO MD         2/17/2018  9:38 PM      Narrative     RIGHT WRIST THREE OR MORE VIEWS    2/17/2018 9:11 PM     HISTORY: Fall, right wrist pain.    COMPARISON: None.    FINDINGS: Negative right wrist. No fracture.        Impression     IMPRESSION: Negative.     MALENA STRANGE MD         2/17/2018  9:38 PM      Narrative     RIGHT  KNEE THREE VIEWS     2/17/2018 9:10 PM     HISTORY: Fall, right anterior knee pain, patella and proximal tibia.      COMPARISON: None.    FINDINGS: Negative right knee. No fracture.        Impression     IMPRESSION: Negative.     MALENA STRANGE MD         2/17/2018  9:38 PM      Narrative     HAND BILATERAL TWO VIEWS     2/17/2018 9:11 PM     HISTORY: Hand injury.    COMPARISON: None.    FINDINGS:     Right hand: Subluxed or dislocated right middle phalanx at the PIP  joint of the right fifth finger. No fracture identified.    Left hand: Normal.        Impression     IMPRESSION: Subluxed or dislocated right fifth middle phalanx with  respect to the proximal phalanx at the PIP joint. No fracture  identified. Left hand negative.    MALENA STRANGE MD                Clinical Quality Measure: Blood Pressure Screening     Your blood pressure was checked while you were in the emergency department today. The last reading we obtained was  BP: (!) 158/93 . Please read the guidelines below about what these numbers mean and what you should do about them.  If your systolic blood pressure (the top number) is less than 120 and your diastolic blood pressure (the bottom number) is less than 80, then your blood pressure is normal. There is nothing more that you need to do about it.  If your systolic blood pressure (the top number) is 120-139 or your diastolic blood pressure (the bottom number) is  "80-89, your blood pressure may be higher than it should be. You should have your blood pressure rechecked within a year by a primary care provider.  If your systolic blood pressure (the top number) is 140 or greater or your diastolic blood pressure (the bottom number) is 90 or greater, you may have high blood pressure. High blood pressure is treatable, but if left untreated over time it can put you at risk for heart attack, stroke, or kidney failure. You should have your blood pressure rechecked by a primary care provider within the next 4 weeks.  If your provider in the emergency department today gave you specific instructions to follow-up with your doctor or provider even sooner than that, you should follow that instruction and not wait for up to 4 weeks for your follow-up visit.        Thank you for choosing [facility]       Thank you for choosing [facility] for your care. Our goal is always to provide you with excellent care. Hearing back from our patients is one way we can continue to improve our services. Please take a few minutes to complete the written survey that you may receive in the mail after you visit with us. Thank you!        CDEL Information     CDEL lets you send messages to your doctor, view your test results, renew your prescriptions, schedule appointments and more. To sign up, go to [email or web address] . Click on \"Log in\" on the left side of the screen, which will take you to the Welcome page. Then click on \"Sign up Now\" on the right side of the page.     You will be asked to enter the access code listed below, as well as some personal information. Please follow the directions to create your username and password.     Your access code is: [email or web address]  Expires: 3/21/2018  9:11 PM     Your access code will  in 90 days. If you need help or a new code, please call your [facility] clinic or [phone].        Care EveryWhere ID     This is your Care EveryWhere ID. This could be used by " other organizations to access your [facility] medical records  [id number]        Equal Access to Services     KARSON TURNER : Francis Lemus, stormy morel, malinda romero. So [facility] [phone].    ATENCIÓN: Si habla español, tiene a arellano disposición servicios gratuitos de asistencia lingüística. Llame al [phone].    We comply with applicable federal civil rights laws and [place] laws. We do not discriminate on the basis of race, color, national origin, age, disability, sex, sexual orientation, or gender identity.            After Visit Summary       This is your record. Keep this with you and show to your community pharmacist(s) and doctor(s) at your next visit.

## 2018-02-18 NOTE — DISCHARGE INSTRUCTIONS
Finger Dislocation  A finger dislocation occurs when the tissues, or ligaments, that hold the joint together are torn. The bones then move apart, or are dislocated, out of their normal position. This causes pain, swelling, and bruising. Sometimes there is also a small chip fracture. Once the joint is put back into place again, it will take about 6 weeks for the ligaments to heal. During this time, you should protect your finger from re-injury.     Buddy taping is a way to secure your injured finger to the one next to it. Buddy taping allows the joint to move. But it also protects it from dislocating again. Buddy tape can be left in place for up to 6 weeks.  Hand exercises may be prescribed at your follow-up visit. These can help speed healing and maintain function. In most cases you will regain full function of your finger. But it may take 12 to 18 months before all mild pain and swelling goes away and full function returns.  Home care    Keep your hand raised, or elevated, to reduce pain and swelling. When sitting or lying down, raise your arm above the level of your heart. You can do this by placing your arm on a pillow that rests on your chest. Or your arm can be on a pillow at your side. This is most important during the first 48 hours after injury.    Put an ice pack on the injured area for no more than 15 to 20 minutes every 3 to 6 hours. Do this for the first 24 to 48 hours. You can make an ice pack by wrapping a plastic Ziploc bag of ice cubes in a thin towel. As the ice melts, be careful that the tape, gauze, or splint doesn t get wet. After that, keep using ice as needed to ease pain and swelling.    If you have a removable splint, you may take it off to bathe and then put it back on. If you have a permanent splint, cover your entire hand with 2 plastic bags. Place 1 bag around the other. Tape each bag with duct tape at the top end. Water can still leak in even when your hand is covered. So it's best to  keep the splint away from water. If a splint gets wet, you can dry it with a hair-dryer on a cool setting.     If you use kulwant tape and it becomes wet or dirty, change it. You may replace it with paper, plastic, or cloth tape. Cloth tape and paper tapes must be kept dry. When re-applying kulwant tape, use gauze or cotton padding between your fingers. This will prevent the skin from getting moist and breaking down, or macerating. It is very important to put padding at the web space. This is the small piece of skin that joins the bases of your fingers. Keep the kulwant tape in place as instructed by your healthcare provider.    You may use over-the-counter pain medicine to control pain, unless another pain medicine was prescribed. Talk with your provider before taking these medicines if you have chronic liver or kidney disease. Also talk with your provider if you have ever had a stomach ulcer or GI (gastrointestinal) bleeding.    Do not play sports or do any physical exercise until your healthcare provider says that you can.  Follow-up care  Follow up with your healthcare provider in 1 week, or as advised. Splints should generally not be left in place longer than 3 weeks to avoid stiffness and loss of joint function. It is important that you see the referral doctor. This provider can determine how long to keep your splint in place and when to begin hand exercises.  If X-rays were taken, you will be told of any new findings that may affect your care.  When to seek medical advice  Call your healthcare provider right away if any of the following occur:    The injured finger has more pain or swelling    The injured finger becomes red or warm    The injured finger becomes cold, blue, numb, or tingly  Date Last Reviewed: 11/23/2015 2000-2017 [employer]. [address] [place] [address], [place] [address]. All rights reserved. This information is not intended as a substitute for professional medical care. Always follow  your healthcare professional's instructions.

## 2018-02-18 NOTE — ED NOTES
Patient fell outside and landed on right hand. Patient having right knee pain and right hand pain. ABC intact without need for intervention. Patient received 100mcg of Fentanyl PTA.  Patient does not believe that she hit her head or had LOC.

## 2018-02-18 NOTE — ED PROVIDER NOTES
History     Chief Complaint:  Hand injury    HPI   Iva Valencia is a 43 year old female who presents to the ED via EMS for evaluation of a hand injury. The patient reports that just prior to arrival, she was walking on the sidewalk when she tripped on a raised crack on the sidewalk, fell, and landed on her right hand. She reports having pain in her anterior right hand and right pinky along with left ring and pinky pain to the tip area. Her right pinky is deformed here in the ED. She also has pain in the anterior area of the right knee along with an abrasion to the area. She denies hitting her head or experiencing a loss of consciousness during the fall. She denies any other pain such as in her collar bones, ribs, abdomen, upper arms, hips, thighs, and ankles. She is slightly shaky here in the ED and reports this occurs when her anxiety worsens. The patient did receive 100 mcg of Fentanyl by EMS prior to arrival.    Allergies:  Buspirone  Ceclor  Claritin  Compazine  Droperidol  Penicillins  Phenergan  Prednisone  Propranolol  Prozac  Reglan  Sumatriptan  Tessalon perles  Zyprexa  Levothyroxine     Medications:    Cogentin  Doxycycline hyclate  Prozac  Zantac  Buspirone  Flexeril  Melatonin  Atarax  Keppra  Metronidazole  Iron supplement  Zofran  Multi-vitamin  Nexium  Cymbalta  Toradol  Benadryl  Flonase  Synthroid  Omega 3  Ibuprofen  Tylenol     Past Medical History:  Overdose of benzodiazepine  Depression  IBS  Migraine    Iron deficiency anemia  Seasonal allergic rhinitis  Undifferentiated somatoform disorder  Anemia  Anxiety  Depressive disorder  Fibromyalgia  GERD  Headache  Herpes simplex without mention of complication  Seizures  Tendonitis  Thyroid disease  Achilles bursitis or tendinitis  Esophageal reflux  Cervicalgia  Hyperlipidemia  Incontinence of urine  Gottron's papules  Hypothyroidism  Partial epilepsy  PTSD    Past Surgical History:    Exc pressure ulcer treatment, heel  Retrocalcaneal  bursitis and Sarah deformity procedure  Right ankle arthroscopy  Foot surgery  Laparoscopic cholecystectomy  Cryotherapy, cervical  D&C  PE tubes  Tonsillectomy     Family History:    Sjogrens  Lipids  Obesity  Bipolar disorder    Social History:  Smoking status: Former  Alcohol use: Yes  Marital Status:       Review of Systems   Cardiovascular: Negative for chest pain.   Gastrointestinal: Negative for abdominal pain, nausea and vomiting.   Musculoskeletal: Negative for back pain, joint swelling and neck pain.        Positive: right anterior hand and pinky pain   Positive: left ring and pinky finger pain   Skin: Positive for wound (abrasion to right knee cap). Negative for color change.   Neurological: Positive for tremors. Negative for syncope and headaches.   Psychiatric/Behavioral: The patient is nervous/anxious.    All other systems reviewed and are negative.    Physical Exam   Patient Vitals for the past 24 hrs:   BP Temp Temp src Pulse Heart Rate Resp SpO2   02/17/18 2015 (!) 158/93 - - - - - -   02/17/18 2000 (!) 140/98 - - - - - -   02/17/18 1945 (!) 148/95 - - - - - 99 %   02/17/18 1930 - - - - - - 99 %   02/17/18 1915 (!) 149/107 - - - - - 96 %   02/17/18 1906 (!) 188/91 99.1  F (37.3  C) Oral 83 83 20 98 %     Physical Exam  Constitutional:  Appears well-developed and well-nourished. Alert. Conversant. Non toxic.  HENT:   Head: Atraumatic. No depressed skull fracture, Racoon Eyes, Wilson's sign, or hemotympanum. Face normal.  Mouth/Throat: Oral mucosa is clear and moist. no trismus. Pharynx normal. Tonsils symmetric. No tonsillar enlargement, erythema, or exudate.  Eyes: Conjunctivae normal. EOM normal. Pupils equal, round, and reactive to light. No scleral icterus.   Neck: Normal range of motion. Neck supple. No tracheal deviation present.   Cardiovascular: Normal rate, regular rhythm. No gallop. No friction rub. No murmur heard. Symmetric radial artery pulses   Pulmonary/Chest: Effort normal.  No stridor. No respiratory distress. No wheezes. No rales. No rhonchi . No tenderness.   Abdominal: Soft. No distension. No mass. No tenderness. No rebound. No guarding.   Musculoskeletal:   LUE: Clavicle, shoulder, humerus, elbow, forearm, wrist are nontender.  Mild tenderness over the metacarpals of the fourth and fifth digits.  Range of motion in the MCP joints is nearly normal but slightly limited with incomplete flexion due to pain.  Normal range of motion of the interphalangeal joints and PIP, DIP joints.  Intact motor and sensory function in the median, radial, ulnar nerve distributions  RUE: She clavicle, shoulder, humerus, elbow, forearm are nontender.  Wrist mild tenderness diffusely over the dorsum, more than on the ulnar side than the radial, without deformity, crepitus.  And tenderness diffusely over the metacarpals without any swelling or deformity.  She does have swelling and apparent deformity of the PIP joint of the fifth digit with ulnar directed angulation of the distal finger.  Intact digital and median, radial, ulnar nerve sensory function.  Range of motion in the fingers is limited by pain.  Thumb is nontender.  RLE: Pelvis is stable.  Hip is nontender.  Femur and thigh are nontender.  Knee she is able to range the knee from full extension to beyond 90  flexion.  There is anterior tenderness just inferior to the patella where there is some soft tissue swelling and a 2 x 4 cm area of superficial abrasion.  No bony tenderness over the proximal fibula.  No obvious deformity.  No tenderness over the distal fibula ankle or foot.  LLE: Normal range of motion. No edema. No tenderness. No deformity  Neurological: Alert and oriented to person, place, and time. Cranial Nerves intact II-XII except I did not formally test gag or visual acuity.  EOMI. Strength 5/5 bilaterally in the trapezius, deltoid, biceps, triceps, , thumb opposition, finger abduction, psoas, quadriceps, hamstring, gastrocnemius,  tibialis anterior. Sensation intact to light touch in all 4 extremities (C4-T1 and L4-S1). Finger to nose and coordination normal. Mental status normal. Attention normal. GCS 15. Memory normal. Speech fluent. Cognition normal. Gait normal.   Nose: Nose normal.  Skin: Skin is warm and dry. No rash noted. No pallor. Normal capillary refill.  Psychiatric:  Normal mood.  Mildly anxious, shaky.  Has a known history of anxiety.     Emergency Department Course     Imaging:  Radiographic findings were communicated with the patient who voiced understanding of the findings.    X-ray Bilateral Hand, 2 views:  IMPRESSION: Subluxed or dislocated right fifth middle phalanx with  respect to the proximal phalanx at the PIP joint. No fracture  identified. Left hand negative.  Result per radiology.     X-ray Right Wrist, 3 views:  IMPRESSION: Negative.  Result per radiology.     X-ray Right Knee, 3 views:  IMPRESSION: Negative.   Result per radiology.     X-ray Right Fingers, 3 views:  IMPRESSION: Single lateral view demonstrates a dorsal dislocation at  the proximal interphalangeal joint of what is presumed to be the fifth  finger. There appears to be flexor tendon avulsion fracture at the  base of the middle phalanx, the fracture fragment proximally retracted  adjacent to the volar aspect of the proximal phalangeal head.  Result per radiology.     Procedures:  Procedure Note: Reduction of Fractured Right Lanny  Physician:  Jamar Milton MD  Diagnosis: Closed dislocation of interphalangeal joint of right little finger  Consent: Informed written, see paper chart  Description of Procedure: Consent as above.  Patient positioned in supine position.  Procedural anesthesia was utilized, see above note.  The arm was grasped above and below fracture.  Recreating mechanism of injury the fracture area was reduced successfully utilizing distal traction and gentle volar pressure.   The neurovascular exam was normal before and after  reduction attempt.  The patient tolerated the procedure well, there were no complications.      Procedure Note: Splint Placement  Physician:  Jamar Milton MD  Diagnosis: Closed dislocation of interphalangeal joint of right little finger  Consent: Informed written, see paper chart  Description of Procedure:  Following reduction of fracture a splint was applied (orthoglass).  The right pinky was maintained at 180 degrees.  The neurovascular exam was normal before and after splint placement.  The patient tolerated the procedure well, there were no complications.     Interventions:  2022: Ativan 0.5 mg IV injection  2022: Toradol 30 mg IV injection    Emergency Department Course:  The patient arrived in the emergency department via EMS.  Past medical records, nursing notes, and vitals reviewed.  1952: I performed an exam of the patient and obtained history, as documented above. GCS 15.    IV inserted.    2020: I rechecked the patient. Numbed the patient's right pinky area.    Reduction of right pinky performed successfully.     2221: I rechecked the patient. Findings and plan explained to the Patient. Patient discharged home with instructions regarding supportive care, medications, and reasons to return. The importance of close follow-up was reviewed.     Impression & Plan      Medical Decision Making:  Iva Valencia is a 43 year old female came to the ER for evaluation of injuries that occurred after mechanical trip and fall on uneven sidewalk this evening.  She denies head injury or any thoracic abdominal trauma.  Injuries are orthopedic including predominantly to her right hand and fifth digit, also to her left hand and right knee.    Imaging of the patient's right hand revealed that she had a fracture/dislocation of the PIP joint of her fifth digit.  There does appear to be a small avulsion fracture likely from the flexor tendon from the base of the middle phalanges.  She was neurovascularly intact in that  finger.  We performed digital block and were able to successfully reduce her dislocated IP joint.  Placed her into a AlumaFoam splint will have her follow-up with orthopedics for reevaluation.  Discussed the potential need for surgical repair given the associated avulsion fracture.  She will follow-up with orbital hand.    X-rays of her left hand are fortunately negative for fracture.  X-rays of the patient's right knee are negative for fracture.  She does clinically have evidence for an abrasion and contusion there which may be the source for pain.  Discussed that she may also have a ligamentous injury.  At this time a ligamentous exam was limited by pain and guarding.  We placed the patient into a knee immobilizer for comfort and will have her follow-up with orthopedics.    This patient does have her chart flagged for the chronic pain policy, but in this case with new acute orthopedic injury, I felt it was appropriate to treat her with pain meds.    Critical Care time:  none    Diagnosis:    ICD-10-CM   1. Closed dislocation of interphalangeal joint of right little finger S63.276A   2. Contusion of right knee, initial encounter S80.01XA       Disposition:  discharged to home    Discharge Medications:  New Prescriptions    HYDROCODONE-ACETAMINOPHEN (NORCO) 5-325 MG PER TABLET    Take 1-2 tablets by mouth every 4 hours as needed for moderate to severe pain    ONDANSETRON (ZOFRAN ODT) 4 MG ODT TAB    Take 1 tablet (4 mg) by mouth every 8 hours as needed for nausea         Yumi Aquino  2/17/2018   [facility] EMERGENCY DEPARTMENT  I, Yumi Aquino, am serving as a scribe at 7:52 PM on 2/17/2018 to document services personally performed by Jamar Milton MD based on my observations and the provider's statements to me.        Jamar Milton MD  02/18/18 0112

## 2018-02-19 ENCOUNTER — HOSPITAL ENCOUNTER (EMERGENCY)
Facility: CLINIC | Age: 43
Discharge: HOME | End: 2018-02-19
Attending: PSYCHIATRY & NEUROLOGY | Admitting: PSYCHIATRY & NEUROLOGY
Payer: MEDICARE

## 2018-02-19 VITALS
HEART RATE: 88 BPM | OXYGEN SATURATION: 97 % | SYSTOLIC BLOOD PRESSURE: 137 MMHG | TEMPERATURE: 98.1 F | RESPIRATION RATE: 16 BRPM | BODY MASS INDEX: 29.52 KG/M2 | DIASTOLIC BLOOD PRESSURE: 89 MMHG | WEIGHT: 171.96 LBS

## 2018-02-19 DIAGNOSIS — F43.0 ACUTE REACTION TO STRESS: ICD-10-CM

## 2018-02-19 DIAGNOSIS — Z63.0 MARITAL CONFLICT: ICD-10-CM

## 2018-02-19 LAB
AMPHETAMINES UR QL SCN: NEGATIVE
BARBITURATES UR QL: NEGATIVE
BENZODIAZ UR QL: NEGATIVE
CANNABINOIDS UR QL SCN: NEGATIVE
COCAINE UR QL: NEGATIVE
ETHANOL UR QL SCN: NEGATIVE
HCG UR QL: NEGATIVE
OPIATES UR QL SCN: POSITIVE

## 2018-02-19 PROCEDURE — 80307 DRUG TEST PRSMV CHEM ANLYZR: CPT | Performed by: FAMILY MEDICINE

## 2018-02-19 PROCEDURE — 99285 EMERGENCY DEPT VISIT HI MDM: CPT | Mod: 25 | Performed by: PSYCHIATRY & NEUROLOGY

## 2018-02-19 PROCEDURE — 25000132 ZZH RX MED GY IP 250 OP 250 PS 637: Mod: GY | Performed by: PSYCHIATRY & NEUROLOGY

## 2018-02-19 PROCEDURE — 81025 URINE PREGNANCY TEST: CPT | Performed by: FAMILY MEDICINE

## 2018-02-19 PROCEDURE — A9270 NON-COVERED ITEM OR SERVICE: HCPCS | Mod: GY | Performed by: PSYCHIATRY & NEUROLOGY

## 2018-02-19 PROCEDURE — 99284 EMERGENCY DEPT VISIT MOD MDM: CPT | Mod: Z6 | Performed by: PSYCHIATRY & NEUROLOGY

## 2018-02-19 PROCEDURE — 90791 PSYCH DIAGNOSTIC EVALUATION: CPT

## 2018-02-19 PROCEDURE — 80320 DRUG SCREEN QUANTALCOHOLS: CPT | Performed by: FAMILY MEDICINE

## 2018-02-19 RX ORDER — HYDROXYZINE HYDROCHLORIDE 25 MG/1
50 TABLET, FILM COATED ORAL ONCE
Status: COMPLETED | OUTPATIENT
Start: 2018-02-19 | End: 2018-02-19

## 2018-02-19 RX ORDER — HYDROXYZINE HYDROCHLORIDE 50 MG/1
50 TABLET, FILM COATED ORAL 2 TIMES DAILY PRN
Qty: 20 TABLET | Refills: 0 | Status: SHIPPED | OUTPATIENT
Start: 2018-02-19 | End: 2018-03-20

## 2018-02-19 RX ADMIN — HYDROXYZINE HYDROCHLORIDE 50 MG: 25 TABLET ORAL at 22:54

## 2018-02-19 SDOH — SOCIAL STABILITY - SOCIAL INSECURITY: PROBLEMS IN RELATIONSHIP WITH SPOUSE OR PARTNER: Z63.0

## 2018-02-19 ASSESSMENT — ENCOUNTER SYMPTOMS
DECREASED CONCENTRATION: 1
RESPIRATORY NEGATIVE: 1
CARDIOVASCULAR NEGATIVE: 1
EYES NEGATIVE: 1
ENDOCRINE NEGATIVE: 1
GASTROINTESTINAL NEGATIVE: 1
NERVOUS/ANXIOUS: 1
HALLUCINATIONS: 0
CONSTITUTIONAL NEGATIVE: 1
MUSCULOSKELETAL NEGATIVE: 1
NEUROLOGICAL NEGATIVE: 1
HYPERACTIVE: 0

## 2018-02-19 NOTE — ED AVS SNAPSHOT
[facility], Emergency Department    [address]    [facility] [address]    Phone:  [phone]    Fax:  [phone]                                       Iva Valencia   MRN: [id number]    Department:  [facility], Emergency Department   Date of Visit:  2/19/2018           After Visit Summary Signature Page     I have received my discharge instructions, and my questions have been answered. I have discussed any challenges I see with this plan with the nurse or doctor.    ..........................................................................................................................................  Patient/Patient Representative Signature      ..........................................................................................................................................  Patient Representative Print Name and Relationship to Patient    ..................................................               ................................................  Date                                            Time    ..........................................................................................................................................  Reviewed by Signature/Title    ...................................................              ..............................................  Date                                                            Time

## 2018-02-19 NOTE — ED AVS SNAPSHOT
[facility], Emergency Department    [address]    [place] [address]    Phone:  [phone]    Fax:  [phone]                                       Iva Valencia   MRN: [id number]    Department:  [facility], Emergency Department   Date of Visit:  2/19/2018           Patient Information     Date Of Birth          1975        Your diagnoses for this visit were:     Acute reaction to stress     Marital conflict        You were seen by Jason Rosario MD.      Follow-up Information     Follow up with Theresa Hall.    Contact information:    [facility]  [address] [place] [id number] [phone]          Discharge Instructions       Follow-up established care and services  Take hydroxyzine to help with anxiety  Work on healthy coping through therapy    24 Hour Appointment Hotline       To make an appointment at any [facility], call [phone] ([phone]). If you don't have a family doctor or clinic, we will help you find one. [facility] clinics are conveniently located to serve the needs of you and your family.             Review of your medicines      CONTINUE these medicines which may have CHANGED, or have new prescriptions. If we are uncertain of the size of tablets/capsules you have at home, strength may be listed as something that might have changed.        Dose / Directions Last dose taken    hydrOXYzine 50 MG tablet   Commonly known as:  ATARAX   Dose:  50 mg   What changed:    - medication strength  - how much to take  - when to take this  - reasons to take this   Quantity:  20 tablet        Take 1 tablet (50 mg) by mouth 2 times daily as needed for anxiety   Refills:  0          Our records show that you are taking the medicines listed below. If these are incorrect, please call your family doctor or clinic.        Dose / Directions Last dose taken    benztropine 1 MG tablet   Commonly known as:  COGENTIN   Dose:  1 mg   Quantity:  6 tablet         Take 1 tablet (1 mg) by mouth 2 times daily for 3 days   Refills:  0        diphenhydrAMINE 25 MG tablet   Commonly known as:  BENADRYL   Quantity:  30 tablet        Take 1-2 tablets up to every 6 hours as needed for facial movements.   Refills:  0        DOXYCYCLINE HYCLATE PO        Refills:  0        FLEXERIL PO        Refills:  0        fluticasone 50 MCG/ACT spray   Commonly known as:  FLONASE   Dose:  1-2 spray   Quantity:  1 Package        Spray 1-2 sprays into both nostrils daily as needed.   Refills:  2        HYDROcodone-acetaminophen 5-325 MG per tablet   Commonly known as:  NORCO   Dose:  1-2 tablet   Quantity:  15 tablet        Take 1-2 tablets by mouth every 4 hours as needed for moderate to severe pain   Refills:  0        ibuprofen 200 MG tablet   Commonly known as:  ADVIL/MOTRIN   Dose:  1-2 tablet        Take 1-2 tablets by mouth. Every 4 to 6 hours as needed.   Refills:  0        IRON SUPPLEMENT PO   Dose:  65 mg        Take 65 mg by mouth daily (with breakfast)   Refills:  0        levETIRAcetam 500 MG tablet   Commonly known as:  KEPPRA   Dose:  1000 mg   Quantity:  360 tablet        Take 2 tablets (1,000 mg) by mouth 2 times daily   Refills:  11        levothyroxine 50 MCG tablet   Commonly known as:  SYNTHROID   Dose:  50 mcg   Quantity:  90 tablet        Take 1 tablet by mouth daily.   Refills:  1        MELATONIN PO        Refills:  0        METRONIDAZOLE PO   Dose:  500 mg        Take 500 mg by mouth 2 times daily   Refills:  0        Multi-vitamin Tabs tablet   Dose:  1 tablet        Take 1 tablet by mouth daily   Refills:  0        NEXIUM PO        Take by mouth 2 times daily   Refills:  0        omega-3 fatty acids 1200 MG capsule   Dose:  1 capsule        Take 1 capsule by mouth daily.   Refills:  0        ondansetron 4 MG ODT tab   Commonly known as:  ZOFRAN ODT   Dose:  4 mg   Quantity:  10 tablet        Take 1 tablet (4 mg) by mouth every 8 hours as needed for nausea   Refills:  0         REXULTI 2 MG tablet   Dose:  2 mg   Generic drug:  brexpiprazole        Take 2 mg by mouth daily   Refills:  0        TORADOL IM   Dose:  1 mL        Inject 1 mL into the muscle as needed   Refills:  0        TYLENOL 500 MG tablet   Dose:  1-2 tablet   Generic drug:  acetaminophen        Take 1-2 tablets by mouth every 6 hours as needed.   Refills:  0        ZANTAC PO        Refills:  0        ZOFRAN 8 MG tablet   Dose:  8 mg   Generic drug:  ondansetron        Take 8 mg by mouth every 8 hours as needed for nausea   Refills:  0                Prescriptions were sent or printed at these locations (1 Prescription)                   Other Prescriptions                Printed at Department/Unit printer (1 of 1)         hydrOXYzine (ATARAX) 50 MG tablet                Procedures and tests performed during your visit     Drug abuse screen 6 urine (tox)    HCG qualitative urine      Orders Needing Specimen Collection     None      Pending Results     No orders found from 2/17/2018 to 2/20/2018.            Pending Culture Results     No orders found from 2/17/2018 to 2/20/2018.            Pending Results Instructions     If you had any lab results that were not finalized at the time of your Discharge, you can call the ED Lab Result RN at [phone]. You will be contacted by this team for any positive Lab results or changes in treatment. The nurses are available 7 days a week from 10A to 6:30P.  You can leave a message 24 hours per day and they will return your call.        Thank you for choosing [facility]       Thank you for choosing [facility] for your care. Our goal is always to provide you with excellent care. Hearing back from our patients is one way we can continue to improve our services. Please take a few minutes to complete the written survey that you may receive in the mail after you visit with us. Thank you!        [facility] Information     [employer] lets you send messages to your doctor, view your test results,  "renew your prescriptions, schedule appointments and more. To sign up, go to [email or web address] . Click on \"Log in\" on the left side of the screen, which will take you to the Welcome page. Then click on \"Sign up Now\" on the right side of the page.     You will be asked to enter the access code listed below, as well as some personal information. Please follow the directions to create your username and password.     Your access code is: [email or web address]  Expires: 3/21/2018  9:11 PM     Your access code will  in 90 days. If you need help or a new code, please call your [facility] clinic or [phone].        Care EveryWhere ID     This is your Care EveryWhere ID. This could be used by other organizations to access your [facility] medical records  [id number]        Equal Access to Services     KARSON TURNER : Francis Lemus, stormy morel, denver pelaez, malinda hays . So [facility] [phone].    ATENCIÓN: Si habla español, tiene a arellano disposición servicios gratuitos de asistencia lingüística. Llame al [phone].    We comply with applicable federal civil rights laws and [place] laws. We do not discriminate on the basis of race, color, national origin, age, disability, sex, sexual orientation, or gender identity.            After Visit Summary       This is your record. Keep this with you and show to your community pharmacist(s) and doctor(s) at your next visit.                  "
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
## 2018-02-20 NOTE — ED PROVIDER NOTES
History     Chief Complaint   Patient presents with     Anxiety     pt found out her  wants a divorce last Monday, increased anxiety since     Suicidal     fleeting thoughts; no plan     Patient is a 43 year old female presenting with nervous/anxious. The history is provided by the patient and medical records.   Anxiety       Iva Valencia is a 43 year old female who is here feeling highly anxious as her spouse of 4 years told her he did not love her anymore last night. Patient has been upset and fears that divorce is pending. She She has been feeling highly anxious and began having fleeting SI. She had requested something for anxiety since arrival to the ED, but has not had received anything. She is on a number of meds. She denies drug abuse. She has no acute medical concerns. She has a number of allergies and is concerned that she will get something she will react poorly to. She is prescribed hydroxyzine but does not recall how whether it helps. She is willing to take a sample dose here. There is no thought disorder.    Please see DEC Crisis Assessment on 2/19/18 in [facility] for further details.    PERSONAL MEDICAL HISTORY  Past Medical History:   Diagnosis Date     Anemia      Anxiety      Depressive disorder      Fibromyalgia      Gastro-oesophageal reflux disease      Headache(784.0)     Common migraine.  Neuro consult--resolved with treatment of seizures     Herpes simplex without mention of complication 5/19/2006     Seizures (H)     Last seizure over one year ago.      Tendonitis      Thyroid disease      PAST SURGICAL HISTORY  Past Surgical History:   Procedure Laterality Date     C EXC PRES ULCER UNLISTED  2007    heel ulcer treatement     C NONSPECIFIC PROCEDURE  2006    retrocalcaneal bursitis and Sarah deformity     C NONSPECIFIC PROCEDURE  2007    right ankle arthroscopy     C NONSPECIFIC PROCEDURE  2008    foot surgery     CHOLECYSTECTOMY, LAPOROSCOPIC  Feb 2011    Cholecystectomy,  Laparoscopic     CRYOTHERAPY, CERVICAL       D & C       PE TUBES       TONSILLECTOMY       FAMILY HISTORY  Family History   Problem Relation Age of Onset     Musculoskeletal Disorder Mother      Sjogrens      Lipids Father      HEART DISEASE Maternal Grandmother      HEART DISEASE Paternal Grandmother      HEART DISEASE Paternal Grandfather      CANCER Maternal Grandfather      Pancreatic     Obesity Mother      Obesity Maternal Grandmother      Neurologic Disorder Paternal Grandfather      Lipids Paternal Grandmother      OSTEOPOROSIS Maternal Grandmother      Depression Mother      Eye Disorder Maternal Grandmother      Cornea surgery     Bipolar Disorder Son      SOCIAL HISTORY  Social History   Substance Use Topics     Smoking status: Former Smoker     Types: Cigarettes     Quit date: 8/5/1998     Smokeless tobacco: Never Used     Alcohol use No      Comment: 5 times a year     MEDICATIONS  Current Facility-Administered Medications   Medication     hydrOXYzine (ATARAX) tablet 50 mg     Current Outpatient Prescriptions   Medication     brexpiprazole (REXULTI) 2 MG tablet     HYDROcodone-acetaminophen (NORCO) 5-325 MG per tablet     levETIRAcetam (KEPPRA) 500 MG tablet     levothyroxine (SYNTHROID) 50 MCG tablet     ondansetron (ZOFRAN ODT) 4 MG ODT tab     benztropine (COGENTIN) 1 MG tablet     DOXYCYCLINE HYCLATE PO     RaNITidine HCl (ZANTAC PO)     Cyclobenzaprine HCl (FLEXERIL PO)     MELATONIN PO     hydrOXYzine (ATARAX) 25 MG tablet     METRONIDAZOLE PO     Ferrous Sulfate (IRON SUPPLEMENT PO)     ondansetron (ZOFRAN) 8 MG tablet     multivitamin, therapeutic with minerals (MULTI-VITAMIN) TABS     Esomeprazole Magnesium (NEXIUM PO)     Ketorolac Tromethamine (TORADOL IM)     diphenhydrAMINE (BENADRYL) 25 MG tablet     fluticasone (FLONASE) 50 MCG/ACT nasal spray     Omega-3 Fatty Acids (OMEGA 3) 1200 MG capsule     acetaminophen (TYLENOL) 500 MG tablet     ibuprofen (ADVIL,MOTRIN) 200 MG tablet      ALLERGIES  Allergies   Allergen Reactions     Buspirone      Ceclor [Cephalosporins]      Compazine      Side effects     Droperidol      Facial swelling and movements. Dystonic reaction       Penicillins      Phenergan [Promethazine Hcl] Rash     Prednisone Other (See Comments)     suicidal     Propranolol      Prozac [Fluoxetine]      Reglan [Metoclopramide]      Doesn't remember      Sumatriptan      Tessalon Perles [Benzonatate]      Zyprexa [Olanzapine] Unknown     Levothyroxine Rash         I have reviewed the Medications, Allergies, Past Medical and Surgical History, and Social History in the Epic system.    Review of Systems   Constitutional: Negative.    HENT: Negative.    Eyes: Negative.    Respiratory: Negative.    Cardiovascular: Negative.    Gastrointestinal: Negative.    Endocrine: Negative.    Genitourinary: Negative.    Musculoskeletal: Negative.    Skin: Negative.    Neurological: Negative.    Psychiatric/Behavioral: Positive for decreased concentration and suicidal ideas. Negative for hallucinations. The patient is nervous/anxious. The patient is not hyperactive.    All other systems reviewed and are negative.      Physical Exam   BP: 128/86  Heart Rate: 88  Temp: 98.6  F (37  C)  Resp: 20  Weight: 78 kg (171 lb 15.3 oz)  SpO2: 96 %      Physical Exam   Constitutional: She appears well-developed.   HENT:   Head: Normocephalic.   Eyes: Pupils are equal, round, and reactive to light.   Neck: Normal range of motion.   Cardiovascular: Normal rate.    Pulmonary/Chest: Effort normal.   Abdominal: Soft.   Musculoskeletal: Normal range of motion.   Neurological: She is alert.   Skin: Skin is warm.   Psychiatric: Her speech is normal and behavior is normal. Judgment and thought content normal. Her mood appears anxious. She is not agitated, not aggressive, not hyperactive, not actively hallucinating and not combative. Thought content is not paranoid and not delusional. Cognition and memory are normal.  She expresses no homicidal and no suicidal ideation.   Nursing note and vitals reviewed.      ED Course     ED Course     Procedures      Labs Ordered and Resulted from Time of ED Arrival Up to the Time of Departure from the ED   DRUG ABUSE SCREEN 6 CHEM DEP URINE ([facility])   HCG QUALITATIVE URINE            Assessments & Plan (with Medical Decision Making)   Patient with an acute reaction to stress triggered by marital conflict. Patient is in emotional and behavioral control. She felt better after taking 50 mg hydroxyzine and is willing to take a prescription. She is prescribed 20 days of hydroxyzine. She is to follow-up established care and services.    I have reviewed the nursing notes.    I have reviewed the findings, diagnosis, plan and need for follow up with the patient.    New Prescriptions    No medications on file       Final diagnoses:   Acute reaction to stress   Marital conflict       2/19/2018   [facility], [place], EMERGENCY DEPARTMENT     Jason Rosario MD  02/19/18 [id number]

## 2018-03-12 ENCOUNTER — TELEPHONE (OUTPATIENT)
Dept: NEUROLOGY | Facility: CLINIC | Age: 43
End: 2018-03-12

## 2018-03-12 DIAGNOSIS — G40.909 SEIZURE DISORDER (H): ICD-10-CM

## 2018-03-12 RX ORDER — LEVETIRACETAM 500 MG/1
1000 TABLET ORAL 2 TIMES DAILY
Qty: 360 TABLET | Refills: 0 | Status: SHIPPED | OUTPATIENT
Start: 2018-03-12 | End: 2018-06-14

## 2018-03-12 RX ORDER — LEVETIRACETAM 500 MG/1
1000 TABLET ORAL 2 TIMES DAILY
Qty: 120 TABLET | Refills: 1 | Status: SHIPPED | OUTPATIENT
Start: 2018-03-12 | End: 2018-03-12

## 2018-03-12 RX ORDER — LEVETIRACETAM 500 MG/1
1000 TABLET ORAL 2 TIMES DAILY
Qty: 120 TABLET | Refills: 0 | Status: SHIPPED | OUTPATIENT
Start: 2018-03-12 | End: 2018-03-12

## 2018-03-20 ENCOUNTER — TELEPHONE (OUTPATIENT)
Dept: NEUROLOGY | Facility: CLINIC | Age: 43
End: 2018-03-20

## 2018-03-20 ENCOUNTER — OFFICE VISIT (OUTPATIENT)
Dept: NEUROLOGY | Facility: CLINIC | Age: 43
End: 2018-03-20
Payer: MEDICARE

## 2018-03-20 VITALS
BODY MASS INDEX: 32.35 KG/M2 | WEIGHT: 189.5 LBS | DIASTOLIC BLOOD PRESSURE: 90 MMHG | SYSTOLIC BLOOD PRESSURE: 140 MMHG | HEIGHT: 64 IN | HEART RATE: 84 BPM

## 2018-03-20 DIAGNOSIS — R25.1 TREMOR: ICD-10-CM

## 2018-03-20 DIAGNOSIS — R25.1 TREMOR: Primary | ICD-10-CM

## 2018-03-20 LAB
ALBUMIN SERPL-MCNC: 3.6 G/DL (ref 3.4–5)
ANION GAP SERPL CALCULATED.3IONS-SCNC: 5 MMOL/L (ref 3–14)
BUN SERPL-MCNC: 10 MG/DL (ref 7–30)
CALCIUM SERPL-MCNC: 9.2 MG/DL (ref 8.5–10.1)
CHLORIDE SERPL-SCNC: 106 MMOL/L (ref 94–109)
CO2 SERPL-SCNC: 28 MMOL/L (ref 20–32)
CREAT SERPL-MCNC: 0.69 MG/DL (ref 0.52–1.04)
ERYTHROCYTE [DISTWIDTH] IN BLOOD BY AUTOMATED COUNT: 11.2 % (ref 10–15)
GFR SERPL CREATININE-BSD FRML MDRD: >90 ML/MIN/1.7M2
GLUCOSE SERPL-MCNC: 105 MG/DL (ref 70–99)
HCT VFR BLD AUTO: 38.9 % (ref 35–47)
HGB BLD-MCNC: 13 G/DL (ref 11.7–15.7)
MCH RBC QN AUTO: 29.1 PG (ref 26.5–33)
MCHC RBC AUTO-ENTMCNC: 33.4 G/DL (ref 31.5–36.5)
MCV RBC AUTO: 87 FL (ref 78–100)
PHOSPHATE SERPL-MCNC: 3.6 MG/DL (ref 2.5–4.5)
PLATELET # BLD AUTO: 310 10E9/L (ref 150–450)
POTASSIUM SERPL-SCNC: 3.8 MMOL/L (ref 3.4–5.3)
RBC # BLD AUTO: 4.46 10E12/L (ref 3.8–5.2)
SODIUM SERPL-SCNC: 139 MMOL/L (ref 133–144)
WBC # BLD AUTO: 8.9 10E9/L (ref 4–11)

## 2018-03-20 RX ORDER — BENZTROPINE MESYLATE 1 MG/1
1 TABLET ORAL 2 TIMES DAILY
Qty: 60 TABLET | Refills: 1 | Status: ON HOLD | OUTPATIENT
Start: 2018-03-20 | End: 2019-02-14

## 2018-03-20 ASSESSMENT — PAIN SCALES - GENERAL: PAINLEVEL: SEVERE PAIN (7)

## 2018-03-20 NOTE — LETTER
3/20/2018       RE: Iva Valencia  [address]  [facility] [address]     Dear Colleague,    Thank you for referring your patient, Iva Valencia, to the [facility] NEUROLOGY at [facility]. Please see a copy of my visit note below.    Service Date: 2018      Theresa Hall NP   [facility]    [address] [place] [address]      RE: Iva Valencia   MRN: [id number]   : 1975      Dear Dr. Hall:      This patient is 43 years old and well known to me as I have followed her since , I believe, for what seems to be a seizure disorder.  Her diagnosis has never been completely clear.  Her last EEG in  was abnormal with right temporal slowing.  There were no interictal transients.  She was hospitalized in  in trying to clarify her diagnosis as she is having recurrent staring spells with unresponsiveness and some facial movements and during her hospitalization for a number of days she was recorded and no seizures were present in the 5 days, but the EEG was abnormal due to occasional bursts of amplitude delta/delta slowing and rare runs of irregular central temporal theta with some bitemporal sharp and slow wave complexes were also seen.  The patient returns today and says her seizures or episodes have been well controlled.  She had been taking her medication levetiracetam well, and she has been on 1000 twice a day.  The last concentration we have was in 2017 and this was 39.2.  What is troubling her now is that she is having these jerky movements in her hands with tremor like activity and also jerking of the face and some of the arms.  She says she has had these for the last few weeks and they thought initially it was related to a new drug that she is taking for anxiety, Rexulti 2 mg, but this was stopped for 2 weeks and the jerking activity of hands, face and trunk did not cease.      Her stress level seems to be up  slightly but not dramatically.  She is on Norco, but she says she has not been taking it.  She has used Benadryl 25 mg p.r.n. to alleviate the jerks and she said that and the Cogentin that was given to her seem to work.  We cautioned her to take 1 independent of the other and not together.      PHYSICAL EXAMINATION:     GENERAL:   She is a very pleasant young woman.     VITAL SIGNS:   Her blood pressure is 140/90.  Pulse is 84.     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  Pronation test seems to be positive.  The muscle tone is normal.  Reflexes are normal.  She does have some of these jerking movements of her facial muscles and also the shoulder.  I do not see any oculogyric movements and there are no tongue movements or other such.      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  The etiology of this is difficult.  We will check a Keppra level to see if that has gone up in considerable number and also obtain an EEG, but I doubt very much there is any kind of seizure.  We have started her on Cogentin 1 mg t.i.d., which she has been on before and she said it seemed to help her in combination with Benadryl, but we told her not to take them together.  We cautioned her not to take the Norco and we did a metabolic panel today and will see her for followup.      D: 2018   T: 2018   MT: AKA      Name:     GENE REYES   MRN:      [id number]        Account:      [id number]   :      1975           Service Date: 2018      Document: [id number]       Again, thank you for allowing me to participate in the care of your patient.      Sincerely,    Jon Boss MD

## 2018-03-20 NOTE — PROGRESS NOTES
Service Date: 2018      Theresa Hall NP   [facility]    [address] [place] [address]      RE: Iva Valencia   MRN: [id number]   : 1975      Dear Dr. Hall:      This patient is 43 years old and well known to me as I have followed her since , I believe, for what seems to be a seizure disorder.  Her diagnosis has never been completely clear.  Her last EEG in  was abnormal with right temporal slowing.  There were no interictal transients.  She was hospitalized in  in trying to clarify her diagnosis as she is having recurrent staring spells with unresponsiveness and some facial movements and during her hospitalization for a number of days she was recorded and no seizures were present in the 5 days, but the EEG was abnormal due to occasional bursts of amplitude delta/delta slowing and rare runs of irregular central temporal theta with some bitemporal sharp and slow wave complexes were also seen.  The patient returns today and says her seizures or episodes have been well controlled.  She had been taking her medication levetiracetam well, and she has been on 1000 twice a day.  The last concentration we have was in 2017 and this was 39.2.  What is troubling her now is that she is having these jerky movements in her hands with tremor like activity and also jerking of the face and some of the arms.  She says she has had these for the last few weeks and they thought initially it was related to a new drug that she is taking for anxiety, Rexulti 2 mg, but this was stopped for 2 weeks and the jerking activity of hands, face and trunk did not cease.      Her stress level seems to be up slightly but not dramatically.  She is on Norco, but she says she has not been taking it.  She has used Benadryl 25 mg p.r.n. to alleviate the jerks and she said that and the Cogentin that was given to her seem to work.  We cautioned her to take 1 independent of the other and not together.       PHYSICAL EXAMINATION:     GENERAL:   She is a very pleasant young woman.     VITAL SIGNS:   Her blood pressure is 140/90.  Pulse is 84.     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  Pronation test seems to be positive.  The muscle tone is normal.  Reflexes are normal.  She does have some of these jerking movements of her facial muscles and also the shoulder.  I do not see any oculogyric movements and there are no tongue movements or other such.      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  The etiology of this is difficult.  We will check a Keppra level to see if that has gone up in considerable number and also obtain an EEG, but I doubt very much there is any kind of seizure.  We have started her on Cogentin 1 mg t.i.d., which she has been on before and she said it seemed to help her in combination with Benadryl, but we told her not to take them together.  We cautioned her not to take the Norco and we did a metabolic panel today and will see her for followup.      Sincerely,      MD AMY Harris MD             D: 2018   T: 2018   MT: AKA      Name:     GENE REYES   MRN:      [id number]        Account:      [id number]   :      1975           Service Date: 2018      Document: [id number]

## 2018-03-20 NOTE — MR AVS SNAPSHOT
"              After Visit Summary   3/20/2018    Iva Valencia    MRN: [id number]           Patient Information     Date Of Birth          1975        Visit Information        Provider Department      3/20/2018 1:30 PM Jon Boss MD [facility] Neurology        Today's Diagnoses     Tremor    -  1       Follow-ups after your visit        Follow-up notes from your care team     Return in about 3 months (around 2018).      Who to contact     Please call your clinic at [phone] to:    Ask questions about your health    Make or cancel appointments    Discuss your medicines    Learn about your test results    Speak to your doctor            Additional Information About Your Visit        MyChart Information     [employer] is an electronic gateway that provides easy, online access to your medical records. With [email or web address], you can request a clinic appointment, read your test results, renew a prescription or communicate with your care team.     To sign up for [employer] visit the website at [email or web address]   You will be asked to enter the access code listed below, as well as some personal information. Please follow the directions to create your username and password.     Your access code is: [email or web address]  Expires: 2018  4:41 PM     Your access code will  in 90 days. If you need help or a new code, please contact your [facility] or call [phone] for assistance.        Care EveryWhere ID     This is your Care EveryWhere ID. This could be used by other organizations to access your [facility] medical records  [id number]        Your Vitals Were     Pulse Height BMI (Body Mass Index)             84 1.626 m (5' 4\") 32.53 kg/m2          Blood Pressure from Last 3 Encounters:   18 140/90   18 137/89   18 142/86    Weight from Last 3 Encounters:   18 86 kg (189 lb 8 oz)   18 78 kg (171 lb 15.3 oz)   17 77.1 kg (170 lb)            "   We Performed the Following     Keppra (Levetiracetam) Level          Today's Medication Changes          These changes are accurate as of 3/20/18 11:59 PM.  If you have any questions, ask your nurse or doctor.               Stop taking these medicines if you haven't already. Please contact your care team if you have questions.     DOXYCYCLINE HYCLATE PO   Stopped by:  Jon Boss MD           FLEXERIL PO   Stopped by:  Jon Boss MD           hydrOXYzine 50 MG tablet   Commonly known as:  ATARAX   Stopped by:  Jon Boss MD           METRONIDAZOLE PO   Stopped by:  Jon Boss MD                Where to get your medicines      These medications were sent to [facility] [address] [place] [id number]    Hours:  24-hours Phone:  [phone]     benztropine 1 MG tablet                Primary Care Provider Office Phone # Fax #    Theresa Hall [phone]       [facility] [address] [place] [address]        Equal Access to Services     [facility]: Hadii aad ku hadasho Soomaali, waaxda luqadaha, qaybta kaalmada adeegyada, malinda boyd hayjoycelynn matt hays . So [facility] [phone].    ATENCIÓN: Si habla español, tiene a arellano disposición servicios gratuitos de asistencia lingüística. [facility] [phone].    We comply with applicable federal civil rights laws and [place] laws. We do not discriminate on the basis of race, color, national origin, age, disability, sex, sexual orientation, or gender identity.            Thank you!     Thank you for choosing [facility] NEUROLOGY  for your care. Our goal is always to provide you with excellent care. Hearing back from our patients is one way we can continue to improve our services. Please take a few minutes to complete the written survey that you may receive in the mail after your visit with us. Thank you!             Your  Updated Medication List - Protect others around you: Learn how to safely use, store and throw away your medicines at www.disposemymeds.org.          This list is accurate as of 3/20/18 11:59 PM.  Always use your most recent med list.                   Brand Name Dispense Instructions for use Diagnosis    benztropine 1 MG tablet    COGENTIN    60 tablet    Take 1 tablet (1 mg) by mouth 2 times daily    Tremor       diphenhydrAMINE 25 MG tablet    BENADRYL    30 tablet    Take 1-2 tablets up to every 6 hours as needed for facial movements.        fluticasone 50 MCG/ACT spray    FLONASE    1 Package    Spray 1-2 sprays into both nostrils daily as needed.    Seasonal allergic rhinitis       HYDROcodone-acetaminophen 5-325 MG per tablet    NORCO    15 tablet    Take 1-2 tablets by mouth every 4 hours as needed for moderate to severe pain        ibuprofen 200 MG tablet    ADVIL/MOTRIN     Take 1-2 tablets by mouth. Every 4 to 6 hours as needed.        IRON SUPPLEMENT PO      Take 65 mg by mouth daily (with breakfast)        levETIRAcetam 500 MG tablet    KEPPRA    360 tablet    Take 2 tablets (1,000 mg) by mouth 2 times daily    Seizure disorder (H)       levothyroxine 50 MCG tablet    SYNTHROID    90 tablet    Take 1 tablet by mouth daily.    Hypothyroidism       MELATONIN PO           Multi-vitamin Tabs tablet      Take 1 tablet by mouth daily        NEXIUM PO      Take by mouth 2 times daily    Seizure disorder, complex partial (H)       omega-3 fatty acids 1200 MG capsule      Take 1 capsule by mouth daily.        REXULTI 2 MG tablet   Generic drug:  brexpiprazole      Take 2 mg by mouth daily        TORADOL IM      Inject 1 mL into the muscle as needed        TYLENOL 500 MG tablet   Generic drug:  acetaminophen      Take 1-2 tablets by mouth every 6 hours as needed.        ZANTAC PO           ZOFRAN 8 MG tablet   Generic drug:  ondansetron      Take 8 mg by mouth every 8 hours as needed for nausea

## 2018-03-20 NOTE — TELEPHONE ENCOUNTER
DMV Form signed by MD, and brought to this nurse, faxed to [place] [employer], Copy sent to be scanned into EHR, and copy sent to patient at address on file.  Phone call made to patient to inform them.

## 2018-03-21 ASSESSMENT — PATIENT HEALTH QUESTIONNAIRE - PHQ9: SUM OF ALL RESPONSES TO PHQ QUESTIONS 1-9: 18

## 2018-03-22 LAB — LEVETIRACETAM SERPL-MCNC: 26 UG/ML (ref 12–46)

## 2018-06-14 DIAGNOSIS — G40.909 SEIZURE DISORDER (H): ICD-10-CM

## 2018-06-14 RX ORDER — LEVETIRACETAM 500 MG/1
1000 TABLET ORAL 2 TIMES DAILY
Qty: 360 TABLET | Refills: 3 | Status: SHIPPED | OUTPATIENT
Start: 2018-06-14 | End: 2019-04-15

## 2018-06-14 NOTE — TELEPHONE ENCOUNTER
Nurse received refill request for: Lev 500 mg    Last re-ordered: 3/12/18    Last refill: 3/17/18    Last appointment: 3/20/18     Next appointment: none scheduled    From last clinic notes:    Pharmacy: [facility]    Action taken: Refills sent

## 2019-01-08 DIAGNOSIS — G40.89 OTHER SEIZURES (H): Primary | ICD-10-CM

## 2019-01-08 RX ORDER — LEVETIRACETAM 250 MG/1
TABLET ORAL
Qty: 720 TABLET | Refills: 3 | Status: SHIPPED | OUTPATIENT
Start: 2019-01-08 | End: 2019-02-12

## 2019-02-12 ENCOUNTER — HOSPITAL ENCOUNTER (EMERGENCY)
Facility: CLINIC | Age: 44
Discharge: PSYCHIATRIC OR SUBSTANCE USE FACILITY | End: 2019-02-13
Attending: INTERNAL MEDICINE | Admitting: INTERNAL MEDICINE
Payer: MEDICARE

## 2019-02-12 DIAGNOSIS — R45.851 SUICIDAL IDEATION: ICD-10-CM

## 2019-02-12 DIAGNOSIS — G43.901 MIGRAINE WITH STATUS MIGRAINOSUS, NOT INTRACTABLE, UNSPECIFIED MIGRAINE TYPE: ICD-10-CM

## 2019-02-12 PROCEDURE — 25000131 ZZH RX MED GY IP 250 OP 636 PS 637: Mod: GY | Performed by: INTERNAL MEDICINE

## 2019-02-12 PROCEDURE — 99285 EMERGENCY DEPT VISIT HI MDM: CPT | Mod: 25

## 2019-02-12 PROCEDURE — A9270 NON-COVERED ITEM OR SERVICE: HCPCS | Mod: GY | Performed by: INTERNAL MEDICINE

## 2019-02-12 PROCEDURE — 90791 PSYCH DIAGNOSTIC EVALUATION: CPT

## 2019-02-12 PROCEDURE — 25000132 ZZH RX MED GY IP 250 OP 250 PS 637: Mod: GY | Performed by: INTERNAL MEDICINE

## 2019-02-12 PROCEDURE — 25000128 H RX IP 250 OP 636: Performed by: INTERNAL MEDICINE

## 2019-02-12 PROCEDURE — 96372 THER/PROPH/DIAG INJ SC/IM: CPT

## 2019-02-12 RX ORDER — KETOROLAC TROMETHAMINE 15 MG/ML
15 INJECTION, SOLUTION INTRAMUSCULAR; INTRAVENOUS ONCE
Status: COMPLETED | OUTPATIENT
Start: 2019-02-12 | End: 2019-02-12

## 2019-02-12 RX ORDER — ONDANSETRON 4 MG/1
4 TABLET, ORALLY DISINTEGRATING ORAL ONCE
Status: COMPLETED | OUTPATIENT
Start: 2019-02-12 | End: 2019-02-12

## 2019-02-12 RX ORDER — OLANZAPINE 5 MG/1
10 TABLET, ORALLY DISINTEGRATING ORAL ONCE
Status: COMPLETED | OUTPATIENT
Start: 2019-02-12 | End: 2019-02-12

## 2019-02-12 RX ORDER — DIPHENHYDRAMINE HCL 25 MG
25 CAPSULE ORAL ONCE
Status: COMPLETED | OUTPATIENT
Start: 2019-02-12 | End: 2019-02-12

## 2019-02-12 RX ADMIN — KETOROLAC TROMETHAMINE 15 MG: 15 INJECTION, SOLUTION INTRAMUSCULAR; INTRAVENOUS at 21:22

## 2019-02-12 RX ADMIN — OLANZAPINE 10 MG: 5 TABLET, ORALLY DISINTEGRATING ORAL at 21:22

## 2019-02-12 RX ADMIN — ONDANSETRON 4 MG: 4 TABLET, ORALLY DISINTEGRATING ORAL at 21:35

## 2019-02-12 RX ADMIN — DIPHENHYDRAMINE HYDROCHLORIDE 25 MG: 25 CAPSULE ORAL at 21:21

## 2019-02-12 ASSESSMENT — ENCOUNTER SYMPTOMS
FACIAL ASYMMETRY: 0
VOMITING: 0
HEADACHES: 1
AGITATION: 1
DYSPHORIC MOOD: 1
NAUSEA: 1
FEVER: 0
PHOTOPHOBIA: 1
NUMBNESS: 0
WEAKNESS: 0

## 2019-02-12 ASSESSMENT — MIFFLIN-ST. JEOR: SCORE: 1506.83

## 2019-02-13 ENCOUNTER — HOSPITAL ENCOUNTER (INPATIENT)
Facility: CLINIC | Age: 44
LOS: 2 days | Discharge: HOME | DRG: 885 | End: 2019-02-15
Attending: PSYCHIATRY & NEUROLOGY | Admitting: PSYCHIATRY & NEUROLOGY
Payer: MEDICARE

## 2019-02-13 VITALS
HEIGHT: 64 IN | WEIGHT: 190 LBS | SYSTOLIC BLOOD PRESSURE: 129 MMHG | TEMPERATURE: 98.9 F | RESPIRATION RATE: 16 BRPM | HEART RATE: 87 BPM | BODY MASS INDEX: 32.44 KG/M2 | DIASTOLIC BLOOD PRESSURE: 85 MMHG | OXYGEN SATURATION: 100 %

## 2019-02-13 DIAGNOSIS — F43.10 PTSD (POST-TRAUMATIC STRESS DISORDER): Primary | ICD-10-CM

## 2019-02-13 LAB — HCG UR QL: NEGATIVE

## 2019-02-13 PROCEDURE — A9270 NON-COVERED ITEM OR SERVICE: HCPCS | Mod: GY | Performed by: EMERGENCY MEDICINE

## 2019-02-13 PROCEDURE — 25000132 ZZH RX MED GY IP 250 OP 250 PS 637: Mod: GY | Performed by: PSYCHIATRY & NEUROLOGY

## 2019-02-13 PROCEDURE — 81025 URINE PREGNANCY TEST: CPT | Performed by: EMERGENCY MEDICINE

## 2019-02-13 PROCEDURE — 90853 GROUP PSYCHOTHERAPY: CPT

## 2019-02-13 PROCEDURE — A9270 NON-COVERED ITEM OR SERVICE: HCPCS | Mod: GY | Performed by: PSYCHIATRY & NEUROLOGY

## 2019-02-13 PROCEDURE — 25000132 ZZH RX MED GY IP 250 OP 250 PS 637: Mod: GY | Performed by: EMERGENCY MEDICINE

## 2019-02-13 PROCEDURE — 12400000 ZZH R&B MH

## 2019-02-13 RX ORDER — LEVETIRACETAM 500 MG/1
1000 TABLET ORAL 2 TIMES DAILY
Status: DISCONTINUED | OUTPATIENT
Start: 2019-02-13 | End: 2019-02-15 | Stop reason: HOSPADM

## 2019-02-13 RX ORDER — FLUTICASONE PROPIONATE 50 MCG
1-2 SPRAY, SUSPENSION (ML) NASAL DAILY PRN
Status: DISCONTINUED | OUTPATIENT
Start: 2019-02-13 | End: 2019-02-15 | Stop reason: HOSPADM

## 2019-02-13 RX ORDER — ACETAMINOPHEN 325 MG/1
650 TABLET ORAL EVERY 6 HOURS PRN
Status: DISCONTINUED | OUTPATIENT
Start: 2019-02-13 | End: 2019-02-14 | Stop reason: ALTCHOICE

## 2019-02-13 RX ORDER — PANTOPRAZOLE SODIUM 40 MG/1
40 TABLET, DELAYED RELEASE ORAL DAILY
Status: DISCONTINUED | OUTPATIENT
Start: 2019-02-13 | End: 2019-02-15 | Stop reason: HOSPADM

## 2019-02-13 RX ORDER — ACETAMINOPHEN 325 MG/1
975 TABLET ORAL ONCE
Status: COMPLETED | OUTPATIENT
Start: 2019-02-13 | End: 2019-02-13

## 2019-02-13 RX ORDER — LEVOTHYROXINE SODIUM 50 UG/1
50 TABLET ORAL
Status: DISCONTINUED | OUTPATIENT
Start: 2019-02-13 | End: 2019-02-15 | Stop reason: HOSPADM

## 2019-02-13 RX ORDER — MULTIPLE VITAMINS W/ MINERALS TAB 9MG-400MCG
1 TAB ORAL DAILY
Status: DISCONTINUED | OUTPATIENT
Start: 2019-02-13 | End: 2019-02-15 | Stop reason: HOSPADM

## 2019-02-13 RX ORDER — OLANZAPINE 5 MG/1
5 TABLET, ORALLY DISINTEGRATING ORAL EVERY 6 HOURS PRN
Status: DISCONTINUED | OUTPATIENT
Start: 2019-02-13 | End: 2019-02-15 | Stop reason: HOSPADM

## 2019-02-13 RX ORDER — BENZTROPINE MESYLATE 1 MG/1
1 TABLET ORAL 2 TIMES DAILY
Status: DISCONTINUED | OUTPATIENT
Start: 2019-02-13 | End: 2019-02-13 | Stop reason: ALTCHOICE

## 2019-02-13 RX ORDER — HYDROXYZINE HYDROCHLORIDE 25 MG/1
25 TABLET, FILM COATED ORAL EVERY 4 HOURS PRN
Status: DISCONTINUED | OUTPATIENT
Start: 2019-02-13 | End: 2019-02-15 | Stop reason: HOSPADM

## 2019-02-13 RX ORDER — ONDANSETRON 4 MG/1
4-8 TABLET, FILM COATED ORAL EVERY 8 HOURS PRN
Status: DISCONTINUED | OUTPATIENT
Start: 2019-02-13 | End: 2019-02-14

## 2019-02-13 RX ORDER — PANTOPRAZOLE SODIUM 40 MG/1
40 TABLET, DELAYED RELEASE ORAL DAILY
COMMUNITY

## 2019-02-13 RX ORDER — ONDANSETRON 4 MG/1
4-8 TABLET, FILM COATED ORAL EVERY 8 HOURS PRN
Status: ON HOLD | COMMUNITY
End: 2019-02-15

## 2019-02-13 RX ADMIN — PANTOPRAZOLE SODIUM 40 MG: 40 TABLET, DELAYED RELEASE ORAL at 19:29

## 2019-02-13 RX ADMIN — LEVETIRACETAM 1000 MG: 500 TABLET ORAL at 20:11

## 2019-02-13 RX ADMIN — MELATONIN 5 MG TABLET 5 MG: at 23:43

## 2019-02-13 RX ADMIN — OLANZAPINE 5 MG: 5 TABLET, ORALLY DISINTEGRATING ORAL at 23:43

## 2019-02-13 RX ADMIN — MULTIPLE VITAMINS W/ MINERALS TAB 1 TABLET: TAB at 19:29

## 2019-02-13 RX ADMIN — HYDROXYZINE HYDROCHLORIDE 25 MG: 25 TABLET, FILM COATED ORAL at 19:29

## 2019-02-13 RX ADMIN — ACETAMINOPHEN 975 MG: 325 TABLET, FILM COATED ORAL at 01:50

## 2019-02-13 ASSESSMENT — ACTIVITIES OF DAILY LIVING (ADL)
RETIRED_COMMUNICATION: 0-->UNDERSTANDS/COMMUNICATES WITHOUT DIFFICULTY
RETIRED_EATING: 0-->INDEPENDENT
DRESS: SCRUBS (BEHAVIORAL HEALTH)
AMBULATION: 0-->INDEPENDENT
COGNITION: 0 - NO COGNITION ISSUES REPORTED
SWALLOWING: 0-->SWALLOWS FOODS/LIQUIDS WITHOUT DIFFICULTY
HYGIENE/GROOMING: SHOWER
FALL_HISTORY_WITHIN_LAST_SIX_MONTHS: NO
TRANSFERRING: 0-->INDEPENDENT
BATHING: 0-->INDEPENDENT
TOILETING: 0-->INDEPENDENT
ORAL_HYGIENE: INDEPENDENT
DRESS: 0-->INDEPENDENT

## 2019-02-13 ASSESSMENT — MIFFLIN-ST. JEOR
SCORE: 1534.05
SCORE: 1534.05

## 2019-02-13 NOTE — PROGRESS NOTES
.Nursing assessment complete including patient and medication profiles. Risk assessments completed addressing suicide,fall,skin,nutrition and safety issues. Care plan initiated. Assessments reviewed with physician and admit orders received. Video monitoring in progress, Patient Informed..Welcome packet reviewed with patient. Information reviewed includes getting emergency help, preventing infections, understanding your care, using medication safely, reducing falls, preventing pressure ulcers, smoking cessation, powerful choices and Patients Bill of Rights. Pt. given tour of the unit and instruction on use of facility including emergency call light. Program schedule reviewed with patient. Questions regarding the unit addressed. Pt. Search completed and belongings inventoried.

## 2019-02-13 NOTE — ED NOTES
Patient signed out to me at shift change (2300). Please see physician H&P and DEC notes for full details On ALMA DELIA.  She has been evaluated by DEC.  Plan for inpatient mental health assessment.  Patient in a crisis state and unable to adequately contract for safety. Some suicidal ideation as well as uncontrolled anxiety.  No social support net.  Also noted a migraine headache which she is treated for often in the ED.  This was treated with her usual combination of medications.  Cooperative without the need for sedation here.       Anthony Jones MD  02/12/19 7287

## 2019-02-13 NOTE — ED PROVIDER NOTES
History     Chief Complaint:  Psychiatric Evaluation    HPI   Iva Valencia is a 44 year old female with a history of anxiety, depression and frequently recurring migraines, who presents to the emergency department for psychiatric evaluation after PD were called to her [employer] [facility] group this evening. The patient has struggled with mental health in the past. She does not currently have a psychiatrist but is on Brexpiprazole for her depression. More recently, the patient had a divorce finalized in January of this year. She has started meeting with a divorce support group through her Judaism, and states that she has befriended the leader of this group, whom she feels betrayed her this evening when she refused to accompany her to a meeting with her ex- and then later canceled dinner plans. This feeling escalated at support group later this evening, when the friend requested the patient be moved to another group and the patient became hysterical and agitated, prompting police involvement. The patient was charged with disorderly conduct and has been banned from her support group. She was then brought here for further evaluation. Here the patient is hyperventilating, and reports that she has a pounding headache. Prior to this evening, the patient had been seen in the ED a number of times within the past week regarding a migraine. She states that this was better this morning, but returned with this evenings stressful events. The patient has not had any tylenol or ibuprofen, and today did not take her migraine medication, as she notes this has initially improved. The patient had met with a new neurologist today, who has decided to start her on Botox.     The patient is otherwise not suicidal or homicidal this evening and denies self harm. No other symptoms or concerns reported such as vision changes, focal weakness or numbness or facial asymmetry.     Allergies:  Droperidol  Buspirone  Zenaida  "[Cephalosporins]  Compazine  Dihydroergotamine  Haloperidol  Penicillins  Phenergan [Promethazine Hcl]  Prednisone  Propranolol  Prozac [Fluoxetine]  Reglan [Metoclopramide]  Sumatriptan  Tessalon Perles [Benzonatate]  Zyprexa [Olanzapine]  Benzonatate  Cefaclor  Codeine  Levothyroxine    Medications:    Cogentin   Rexulti   Nexium   Keppra  Synthroid   Melatonin   Zantac   Toradol     Past Medical History:    Anemia  Anxiety   Depression  Fibromyalgia  GERD  Headaches  Herpes simplex   Seizures  Tendonitis  Thyroid disease   IBS  Undifferentiated somatoform disorder    Past Surgical History:    Cholecystectomy   Tonsillectomy   Cryotherapy   D & C    Family History:    Musculoskeletal disorder   Depression   Lipids  Heart disease   Bipolar disorder     Social History:  The patient was accompanied to the ED by her mother.  Smoking Status: Former  Smokeless Tobacco: Never  Alcohol Use: No  Marital Status:   [2]    Review of Systems   Constitutional: Negative for fever.   Eyes: Positive for photophobia.   Gastrointestinal: Positive for nausea. Negative for vomiting.   Neurological: Positive for headaches. Negative for facial asymmetry, weakness and numbness.   Psychiatric/Behavioral: Positive for agitation and dysphoric mood. Negative for self-injury and suicidal ideas.   All other systems reviewed and are negative.    Physical Exam     Patient Vitals for the past 24 hrs:   BP Temp Temp src Pulse Heart Rate Resp SpO2 Height Weight   02/12/19 2316 (!) 153/101 98.9  F (37.2  C) Oral 87 -- 16 97 % -- --   02/12/19 2123 -- 99.1  F (37.3  C) Oral -- -- -- -- -- --   02/12/19 2043 (!) 165/104 -- -- -- 118 24 100 % 1.626 m (5' 4\") 86.2 kg (190 lb)     Physical Exam   Constitutional:   Pleasant and cooperative   HENT:   Right Ear: Tympanic membrane normal.   Left Ear: Tympanic membrane normal.   Mouth/Throat: Oropharynx is clear and moist and mucous membranes are normal. No posterior oropharyngeal erythema.   Eyes: " Conjunctivae are normal.   Neck: Neck supple.   Cardiovascular: Normal rate, regular rhythm and normal heart sounds.   Pulmonary/Chest: Effort normal and breath sounds normal.   Abdominal: Soft. Bowel sounds are normal. She exhibits no distension. There is no tenderness. There is no rebound and no guarding.   Musculoskeletal: Normal range of motion.   Neurological: She is alert.   Skin: Skin is warm and dry.   Psychiatric: Her mood appears anxious. Her affect is labile. Her speech is rapid and/or pressured.     Emergency Department Course     Interventions:  2121 - Benadryl capsule 25 mg PO  2122 - Zyprexa ODT tablet 10 mg PO   2122 - Toradol injection 15 mg intramuscular given  2135 - Zofran ODT tablet 4 mg PO     Emergency Department Course:  Nursing notes and vitals reviewed.    2052: I performed an exam of the patient as documented above.     2241: I spoke with Javier of the DEC service regarding patient's presentation, findings, and plan of care.    Findings and plan explained to the Patient.    Patient will be transferred to inpatient psychiatric facility via EMS.     Impression & Plan      Medical Decision Making:    Iva Valencia is a 44 year old female with a long history of mental health problems who presents to the emergency department now in a crisis situation.  As noted, she feels abandoned by all of her sources of support.  She has uncontrolled anxiety and is unable to contract to safety.  She was seen by DEC who felt that admission was appropriate.  Patient is on an [facility] hold and will be transferred to an inpatient mental health facility as soon as one is available.    Patient has been struggling for some days with migraine.  She was treated with her usual medication regimen with some improvement.  No indication of intracranial emergency.      Diagnosis:    ICD-10-CM    1. Suicidal ideation R45.851    2. Migraine headache G43.901        Disposition:  Transferred to inpatient psychiatric bed.        Brandee Grimes  2/12/2019   [facility] EMERGENCY DEPARTMENT  I, Brandee Grimes, am serving as a scribe at 8:52 PM on 2/12/2019 to document services personally performed by Ynes Donato MD based on my observations and the provider's statements to me.       Ynes Donato MD  02/12/19 [id number]

## 2019-02-13 NOTE — ED NOTES
Bed: ED06  Expected date: 2/12/19  Expected time: 8:11 PM  Means of arrival: Ambulance  Comments:  Renée Stern

## 2019-02-13 NOTE — ED TRIAGE NOTES
"Arrives via EMS from [facility] group.  Patient feels betrayed from friend she had met in the group.  The friend did not want to go to [facility] with her earlier.  Tonight while at [facility]orceCare the friend felt uncomfortable with patient sitting by her.  Patient was asked to join another group at another table.  Patient then became hysterical, police were called.  Patient would not settle down and received a ticket for disorderly conduct. There was some verbal altercations at the group.  Hx anxiety/depression, recent divorce finalized in January.  Patient hyperventilating upon arrival, has calmed at this time and is sitting in the bed cooperating with staff present. Martin crackers and lemon-lime soda given per request of patient. Patient states \"I'm getting a migraine.  I've had it since last Thursday or Friday.  It was getting better, but never completely, now it's getting worse again.\"  Photophobic, nauseous at this time after taking sips of soda. Started seeing new neurologist today for migraines.  Has not taken anything for migraine today. ABCD's intact; A/O x 4.   "

## 2019-02-13 NOTE — PROGRESS NOTES
02/13/19 1419   Patient Belongings   Did you bring any home meds/supplements to the hospital?  No   Patient Belongings locker   Belongings Search Yes   Clothing Search Yes   Second Staff Luciano     Black purse  Green winter jacket  miscellaneous paper  Book  Citation   Belt  Shoes w/string  Rewards cards  Membership cards  Black wallet  Toiletries  Pens  [place] Drivers' license   Phone   chap stick  Floss  [employer]  Metal butterfly  $10 dollar quarter rolls x2  tums    Security Envelope #[id number]  Damaged [employer] card #[id number]  Asprin   Naproxen   [place] EBT card #[id number]  [employer] gift card #[id number]  Visa #[id number]      A               Admission:  I am responsible for any personal items that are not sent to the safe or pharmacy.  [facility] is not responsible for loss, theft or damage of any property in my possession.    Signature:  _________________________________ Date: _______  Time: _____                                              Staff Signature:  ____________________________ Date: ________  Time: _____      2nd Staff person, if patient is unable/unwilling to sign:    Signature: ________________________________ Date: ________  Time: _____     Discharge:  [facility] has returned all of my personal belongings:    Signature: _________________________________ Date: ________  Time: _____                                          Staff Signature:  ____________________________ Date: ________  Time: _____

## 2019-02-13 NOTE — ED NOTES
Patient placed on wait list for inpatient psych, will be placed sometime tomorrow. FELICITAS   Statement Selected

## 2019-02-13 NOTE — PROGRESS NOTES
Iva admitted under a crisis situation, she was upset, hysterical and agitated.  According to patient she was attending a support groups for divorcees and the  who is also a friend told her that she could not attend the group and she needed to move to another one.  She said , the previous night she asked Hali the  to go with her to [place] to get money from her ex , and Hali declined. Iva feels betrayed by her friends behavior, she feels sad about the finalization of divorce and she feels upset because the  of the [employer], called the Police, when there was not reason for it.  Complained of migraines headaches, denies any suicidal or homicidal ideation.  Admission completed.

## 2019-02-14 LAB
AMPHETAMINES UR QL SCN: NEGATIVE
ANION GAP SERPL CALCULATED.3IONS-SCNC: 8 MMOL/L (ref 3–14)
BARBITURATES UR QL: NEGATIVE
BENZODIAZ UR QL: NEGATIVE
BUN SERPL-MCNC: 13 MG/DL (ref 7–30)
CALCIUM SERPL-MCNC: 8.5 MG/DL (ref 8.5–10.1)
CANNABINOIDS UR QL SCN: NEGATIVE
CHLORIDE SERPL-SCNC: 110 MMOL/L (ref 94–109)
CO2 SERPL-SCNC: 24 MMOL/L (ref 20–32)
COCAINE UR QL: NEGATIVE
CREAT SERPL-MCNC: 0.74 MG/DL (ref 0.52–1.04)
ERYTHROCYTE [DISTWIDTH] IN BLOOD BY AUTOMATED COUNT: 12.5 % (ref 10–15)
GFR SERPL CREATININE-BSD FRML MDRD: >90 ML/MIN/{1.73_M2}
GLUCOSE SERPL-MCNC: 103 MG/DL (ref 70–99)
HCT VFR BLD AUTO: 35.2 % (ref 35–47)
HGB BLD-MCNC: 11.5 G/DL (ref 11.7–15.7)
MCH RBC QN AUTO: 27.1 PG (ref 26.5–33)
MCHC RBC AUTO-ENTMCNC: 32.7 G/DL (ref 31.5–36.5)
MCV RBC AUTO: 83 FL (ref 78–100)
OPIATES UR QL SCN: NEGATIVE
PCP UR QL SCN: NEGATIVE
PLATELET # BLD AUTO: 270 10E9/L (ref 150–450)
POTASSIUM SERPL-SCNC: 3.8 MMOL/L (ref 3.4–5.3)
RBC # BLD AUTO: 4.25 10E12/L (ref 3.8–5.2)
SODIUM SERPL-SCNC: 142 MMOL/L (ref 133–144)
TSH SERPL DL<=0.005 MIU/L-ACNC: 0.94 MU/L (ref 0.4–4)
WBC # BLD AUTO: 5.8 10E9/L (ref 4–11)

## 2019-02-14 PROCEDURE — 25000132 ZZH RX MED GY IP 250 OP 250 PS 637: Mod: GY | Performed by: PSYCHIATRY & NEUROLOGY

## 2019-02-14 PROCEDURE — 85027 COMPLETE CBC AUTOMATED: CPT | Performed by: PHYSICIAN ASSISTANT

## 2019-02-14 PROCEDURE — 84443 ASSAY THYROID STIM HORMONE: CPT | Performed by: PHYSICIAN ASSISTANT

## 2019-02-14 PROCEDURE — 80048 BASIC METABOLIC PNL TOTAL CA: CPT | Performed by: PHYSICIAN ASSISTANT

## 2019-02-14 PROCEDURE — A9270 NON-COVERED ITEM OR SERVICE: HCPCS | Mod: GY | Performed by: PSYCHIATRY & NEUROLOGY

## 2019-02-14 PROCEDURE — 90853 GROUP PSYCHOTHERAPY: CPT

## 2019-02-14 PROCEDURE — 36415 COLL VENOUS BLD VENIPUNCTURE: CPT | Performed by: PHYSICIAN ASSISTANT

## 2019-02-14 PROCEDURE — 90791 PSYCH DIAGNOSTIC EVALUATION: CPT

## 2019-02-14 PROCEDURE — 99223 1ST HOSP IP/OBS HIGH 75: CPT | Mod: AI | Performed by: PHYSICIAN ASSISTANT

## 2019-02-14 PROCEDURE — 12400000 ZZH R&B MH

## 2019-02-14 PROCEDURE — 80307 DRUG TEST PRSMV CHEM ANLYZR: CPT | Performed by: PHYSICIAN ASSISTANT

## 2019-02-14 PROCEDURE — 25000131 ZZH RX MED GY IP 250 OP 636 PS 637: Mod: GY | Performed by: PSYCHIATRY & NEUROLOGY

## 2019-02-14 RX ORDER — ONDANSETRON 4 MG/1
4-8 TABLET, FILM COATED ORAL EVERY 8 HOURS PRN
Status: DISCONTINUED | OUTPATIENT
Start: 2019-02-14 | End: 2019-02-14

## 2019-02-14 RX ORDER — CETIRIZINE HYDROCHLORIDE 10 MG/1
10 TABLET ORAL DAILY
Status: DISCONTINUED | OUTPATIENT
Start: 2019-02-14 | End: 2019-02-15 | Stop reason: HOSPADM

## 2019-02-14 RX ORDER — NAPROXEN 500 MG/1
500 TABLET ORAL 2 TIMES DAILY PRN
COMMUNITY
End: 2020-07-20

## 2019-02-14 RX ORDER — DULOXETIN HYDROCHLORIDE 60 MG/1
120 CAPSULE, DELAYED RELEASE ORAL DAILY
Status: ON HOLD | COMMUNITY
End: 2019-02-15

## 2019-02-14 RX ORDER — ONDANSETRON 4 MG/1
8 TABLET, FILM COATED ORAL 2 TIMES DAILY PRN
Status: DISCONTINUED | OUTPATIENT
Start: 2019-02-14 | End: 2019-02-15 | Stop reason: HOSPADM

## 2019-02-14 RX ORDER — PANTOPRAZOLE SODIUM 40 MG/1
40 TABLET, DELAYED RELEASE ORAL DAILY
Status: DISCONTINUED | OUTPATIENT
Start: 2019-02-14 | End: 2019-02-14

## 2019-02-14 RX ORDER — DIPHENHYDRAMINE HCL 25 MG
25-50 TABLET ORAL EVERY 6 HOURS PRN
COMMUNITY
End: 2020-01-30

## 2019-02-14 RX ORDER — IBUPROFEN 400 MG/1
400 TABLET, FILM COATED ORAL EVERY 6 HOURS PRN
Status: DISCONTINUED | OUTPATIENT
Start: 2019-02-14 | End: 2019-02-15 | Stop reason: ALTCHOICE

## 2019-02-14 RX ORDER — CETIRIZINE HYDROCHLORIDE 10 MG/1
10 TABLET ORAL DAILY
Status: ON HOLD | COMMUNITY
End: 2020-02-08

## 2019-02-14 RX ORDER — POLYETHYLENE GLYCOL 3350 17 G/17G
1 POWDER, FOR SOLUTION ORAL DAILY
COMMUNITY

## 2019-02-14 RX ORDER — ACETAMINOPHEN 325 MG/1
650 TABLET ORAL 3 TIMES DAILY PRN
Status: DISCONTINUED | OUTPATIENT
Start: 2019-02-14 | End: 2019-02-14 | Stop reason: ALTCHOICE

## 2019-02-14 RX ORDER — DIPHENHYDRAMINE HCL 25 MG
25-50 CAPSULE ORAL EVERY 6 HOURS PRN
Status: DISCONTINUED | OUTPATIENT
Start: 2019-02-14 | End: 2019-02-15 | Stop reason: HOSPADM

## 2019-02-14 RX ORDER — IBUPROFEN 200 MG
400 TABLET ORAL EVERY 6 HOURS PRN
COMMUNITY
End: 2023-01-03

## 2019-02-14 RX ORDER — ACETAMINOPHEN 500 MG
1000 TABLET ORAL EVERY 6 HOURS PRN
Status: DISCONTINUED | OUTPATIENT
Start: 2019-02-14 | End: 2019-02-15 | Stop reason: HOSPADM

## 2019-02-14 RX ORDER — NAPROXEN 250 MG/1
500 TABLET ORAL 2 TIMES DAILY PRN
Status: DISCONTINUED | OUTPATIENT
Start: 2019-02-14 | End: 2019-02-15 | Stop reason: HOSPADM

## 2019-02-14 RX ORDER — ACETAMINOPHEN 500 MG
1000 TABLET ORAL EVERY 6 HOURS PRN
COMMUNITY
End: 2023-01-03

## 2019-02-14 RX ORDER — DIAZEPAM 5 MG
5 TABLET ORAL EVERY 6 HOURS PRN
Status: ON HOLD | COMMUNITY
End: 2019-02-15

## 2019-02-14 RX ORDER — OFLOXACIN 3 MG/ML
1-2 SOLUTION/ DROPS OPHTHALMIC DAILY PRN
Status: ON HOLD | COMMUNITY
End: 2020-02-08

## 2019-02-14 RX ORDER — POLYETHYLENE GLYCOL 3350 17 G/17G
17 POWDER, FOR SOLUTION ORAL DAILY
Status: DISCONTINUED | OUTPATIENT
Start: 2019-02-14 | End: 2019-02-15 | Stop reason: HOSPADM

## 2019-02-14 RX ORDER — BENZTROPINE MESYLATE 1 MG/1
1 TABLET ORAL
Status: DISCONTINUED | OUTPATIENT
Start: 2019-02-14 | End: 2019-02-14

## 2019-02-14 RX ORDER — RIZATRIPTAN BENZOATE 10 MG/1
10 TABLET, ORALLY DISINTEGRATING ORAL
Status: DISCONTINUED | OUTPATIENT
Start: 2019-02-14 | End: 2019-02-15 | Stop reason: HOSPADM

## 2019-02-14 RX ORDER — RIZATRIPTAN BENZOATE 10 MG/1
10 TABLET, ORALLY DISINTEGRATING ORAL
COMMUNITY
End: 2019-08-13

## 2019-02-14 RX ADMIN — ACETAMINOPHEN 650 MG: 325 TABLET, FILM COATED ORAL at 18:28

## 2019-02-14 RX ADMIN — LEVETIRACETAM 1000 MG: 500 TABLET ORAL at 08:37

## 2019-02-14 RX ADMIN — ACETAMINOPHEN, ASPIRIN AND CAFFEINE 2 TABLET: 250; 250; 65 TABLET, FILM COATED ORAL at 20:53

## 2019-02-14 RX ADMIN — OLANZAPINE 5 MG: 5 TABLET, ORALLY DISINTEGRATING ORAL at 08:42

## 2019-02-14 RX ADMIN — OLANZAPINE 5 MG: 5 TABLET, ORALLY DISINTEGRATING ORAL at 18:27

## 2019-02-14 RX ADMIN — PANTOPRAZOLE SODIUM 40 MG: 40 TABLET, DELAYED RELEASE ORAL at 08:41

## 2019-02-14 RX ADMIN — LEVOTHYROXINE SODIUM 50 MCG: 50 TABLET ORAL at 08:42

## 2019-02-14 RX ADMIN — VORTIOXETINE 5 MG: 5 TABLET, FILM COATED ORAL at 14:48

## 2019-02-14 RX ADMIN — LEVETIRACETAM 1000 MG: 500 TABLET ORAL at 20:57

## 2019-02-14 RX ADMIN — ONDANSETRON 8 MG: 4 TABLET, FILM COATED ORAL at 16:33

## 2019-02-14 RX ADMIN — DIPHENHYDRAMINE HYDROCHLORIDE 50 MG: 25 CAPSULE ORAL at 22:53

## 2019-02-14 RX ADMIN — HYDROXYZINE HYDROCHLORIDE 25 MG: 25 TABLET, FILM COATED ORAL at 22:08

## 2019-02-14 RX ADMIN — MULTIPLE VITAMINS W/ MINERALS TAB 1 TABLET: TAB at 08:42

## 2019-02-14 RX ADMIN — MELATONIN 5 MG TABLET 5 MG: at 21:29

## 2019-02-14 ASSESSMENT — ACTIVITIES OF DAILY LIVING (ADL)
ORAL_HYGIENE: INDEPENDENT
DRESS: SCRUBS (BEHAVIORAL HEALTH)
HYGIENE/GROOMING: INDEPENDENT
ORAL_HYGIENE: INDEPENDENT
HYGIENE/GROOMING: INDEPENDENT
DRESS: INDEPENDENT
LAUNDRY: WITH SUPERVISION

## 2019-02-14 NOTE — PLAN OF CARE
Goal: Team Discussion  Outcome: No change  BEHAVIORAL TEAM DISCUSSION     Participants: Dr. Horn, social workers, nurses, psych asst   Progress: Pt presents a hopeless affect, pt is not interested in a long stay here and wants to go back home to her pets. Pt is asking  for a referral to a trauma therapy referral.   Continued Stay Criteria/Rationale: Pt will remain on the unit, pt may have a possibility of discharge tomorrow depending how pt does on med changes.   Medical/Physical:Trintillex 5 MG; Discontinue Cogentin.   Precautions:        Behavioral Orders   Procedures    Code 1 - Restrict to Unit    Routine Programming       As clinically indicated    Seizure precautions    Status 15       Every 15 minutes.      Plan: Pt will remain on the unit, pt should be encouraged to go to groups and get out of her room.   Rationale for change in precautions or plan: Pt needs further observation while starting new medication. Pt needs further treatment.
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
## 2019-02-14 NOTE — PLAN OF CARE
Pt presents with full range affect and spent a majority of the shift in bed resting due to stating having a worsening migraine. Pt claims to have had them off and on for a week and is currently taking Zyprexa 10mg but it's not helping. States she spoke with Dr. Horn [facility] and does not remember what the changes are but is hopeful. Med compliant, no Si.

## 2019-02-14 NOTE — PLAN OF CARE
Pt appears anxious and received prn hydroxyzine. Pt did attend OT for awhile and attended Wrap-up. Pt reports headache, pt declined offer of Tylenol but accepted a cool washcloth on her forehead and rested in dark room in bed.  Late entry@ @ 2300: pt requesting Zyprexa which is listed as an allergy; pt states is not allergic to this med and that it was helpful in ER. Prn order received from On-call Dr Matias.

## 2019-02-14 NOTE — PROGRESS NOTES
Patient attended Process Group today and participated minimally. Patient demonstrated emerging insight into the topic of discussion. Her answers during group discussion were brief, but relevant.

## 2019-02-14 NOTE — H&P
[facility]    History and Physical - Hospitalist Service       Date of Admission:  2/13/2019    Assessment & Plan   Iva Valencia is a 44 year old female admitted on 2/13/2019. She is admitted to the mental health unit for acute decompensation of her depression and anxiety.     Acute on chronic depressive episode with anxiety  Please were called to the patient's divorce support group at [employer] day of admission due to escalation of a disagreement between her and another group member.  She was charged with disorderly conduct.  PTA meds including Cogentin and Rexulti.  - Adjust medications per psychiatry  - Confirm outpatient med list with pharmacy  - Obtain labs which are pending currently  - BMP, CBC unremarkable other than Hgb 11.5 (chronic anemia)  - Utox pending, call Hospitalist if discrepancies to address    Frequent migraines  Seizure disorder   Met with a new neurologist on 2/12/19 and to start Botox treatments for migraines.  Noted to be taking Keppra for seizure disorder.  Serum levels were therapeutic at draw last year. Last seizure 3-4 yrs ago per pt. [place] [facility] [place] -   - Resume prior to admission Keppra and Cogentin 1mg TID (ordered per Dr. Boss Neurology)  - Continue with outpatient follow-up for migraines  - Saw neurology day prior to admission, no need to recheck levels    Hypothyroidism: Resume PTA levothyroxine. TSH wnl.    Hx Supraventricular Tachycardia  EP study/ablation 8/2018 at [facility] d/t symptomatic supraventricular tachycardia. Successfully ablated and returned to NSR. She was on ASA x1 month afterward. No recurrent issues. Also with normal stress ECHO 7/2018.    GERD: Resume prior to admission Nexium and Zantac    Hx Chronic Anemia: Notes longstanding hx of anemia, has labs done quarterly for iron infusions, last infusion Oct 2018. Hgb improved from 10 to 13.8.  - Last seen in clinic 12/2018, continuing to check labs Q 3 months  -  Hgb 11.5, adequate, follow up with Hematology as scheduled    C/o vaginal spotting:  Had PCP appointment for this yesterday but was in the hospital. Small amount of blood when she wipes. No associated sx including vaginal or pelvic pain, itching, burning, or lesions per pt report.  - Given expected short stay, recommend following up with PCP/OBGYN on discharge for pelvic exam    Other non-specific c/o such as chronic pain to ankles and forgot her ankle braces at home. She does not feel anyone could bring in her braces. She is requesting to wear her shoes which RN will address. She also c/o sore throat and requests a throat swab however her throat is clear and she does not have respiratory symptoms. Afebrile. Noted to have visits with multiple medical providers in the last year including hematology, cardiology, urology, neurology, PCP, and many ED visits.   - Follow up with PCP and other specialists     Diet: Regular Diet Adult    DVT Prophylaxis: Ambulate  [place] Catheter: not present  Code Status: Full Code      Disposition Plan   Expected discharge: per primary service, psychiatry, recommended to prior living arrangement once mental health issues at baseline or improved..  Entered: Monalisa Farrar PA-C 02/14/2019, 12:03 PM     The patient's care was discussed with the Attending Physician, Dr. Alonso, Bedside Nurse and Patient.    The patient is medically stable and at her baseline status. She should continue to follow up with outpatient specialists and her primary care provider for the above problems. We will sign off at this time.    Monalisa Farrar PA-C  [facility]    ______________________________________________________________________    Chief Complaint   depression    History is obtained from the patient, electronic health record and bedside nurse.    History of Present Illness   Iva Valencia is a 44 year old female with PMH seizures, migraines, fibromyalgia, anemia,  anxiety/depression, hypothyroidism, and GERD who was sent to the ED from her [employer] [facility] group for psychiatric evaluation.  Patient with long-standing history of anxiety and depression taking brexpiprazole however not presently seeing a psychiatrist.  The ED patient reported she is going through divorce was finalized last month and started meeting with a divorce support group through her [employer].  Patient became hysterical and agitated after initially with another group member whom the patient felt had betrayed her trust.  Patient was charged with disorderly conduct and has been banned from her support group and subsequently brought to the emergency department for evaluation.      In the ED she was noted to be hyperventilating and complaining of a pounding headache.  Of note she is been seen in the emergency department several times in the past week regarding migraines.  Patient had met with a new neurologist day of admission who had decided to start her on Botox for her frequent migraines.  She denied any suicidal or homicidal ideation.  Denied self-harm.(-)HCG. of note she is a very long history of drug allergies in epic. Evaluated by DEC and posted for admission to mental health unit on 72 hour hold which has now been changed to a voluntary status.     She presently denies N/V, lightheadedness, dizziness. C/o visual aura which she has with her present and frequent migraines (sees neurology). No chest pain or palpitations. Other c/o as above.     Review of Systems    The 10 point Review of Systems is negative other than noted in the HPI or here.     Past Medical History    I have reviewed this patient's medical history and updated it with pertinent information if needed.   Past Medical History:   Diagnosis Date     Anemia      Anxiety      Depressive disorder      Fibromyalgia      Gastro-oesophageal reflux disease      Herpes simplex 05/19/2006     Hypothyroidism      Migraine     Common migraine.   Neuro consult--resolved with treatment of seizures     Seizures        Past Surgical History   I have reviewed this patient's surgical history and updated it with pertinent information if needed.  Past Surgical History:   Procedure Laterality Date     ARTHROSCOPY ANKLE Right      CHOLECYSTECTOMY, LAPOROSCOPIC  2011     CRYOTHERAPY, CERVICAL       D & C       FOOT SURGERY  2008     Heel ulcer treatement  2007     PE TUBES       Retrocalcaneal bursitis and Sarah deformity treatment  2006     TONSILLECTOMY         Social History   I have reviewed this patient's social history and updated it with pertinent information if needed.  Social History     Tobacco Use     Smoking status: Former Smoker     Types: Cigarettes     Last attempt to quit: 1998     Years since quittin.5     Smokeless tobacco: Never Used   Substance Use Topics     Alcohol use: No     Comment: 5 times a year     Drug use: No       Family History   I have reviewed this patient's family history and updated it with pertinent information if needed.   Family History   Problem Relation Age of Onset     Musculoskeletal Disorder Mother         Sjogrens      Obesity Mother      Depression Mother      Lipids Father      Heart Disease Maternal Grandmother      Obesity Maternal Grandmother      Osteoporosis Maternal Grandmother      Eye Disorder Maternal Grandmother         Cornea surgery     Cancer Maternal Grandfather         Pancreatic     Heart Disease Paternal Grandmother      Lipids Paternal Grandmother      Heart Disease Paternal Grandfather      Neurologic Disorder Paternal Grandfather      Bipolar Disorder Son      Prior to Admission Medications   Prior to Admission Medications   Prescriptions Last Dose Informant Patient Reported? Taking?   Ketorolac Tromethamine (TORADOL IM)  Self Yes No   Sig: Inject 1 mL into the muscle as needed   MELATONIN PO   Yes No   Sig: Take 5 mg by mouth nightly as needed    Omega-3 Fatty Acids (OMEGA 3) 1200 MG  capsule  Self Yes No   Sig: Take 1 capsule by mouth daily.   benztropine (COGENTIN) 1 MG tablet   No No   Sig: Take 1 tablet (1 mg) by mouth 2 times daily   brexpiprazole (REXULTI) 2 MG tablet   Yes No   Sig: Take 2 mg by mouth daily   fluticasone (FLONASE) 50 MCG/ACT nasal spray  Self No No   Sig: Spray 1-2 sprays into both nostrils daily as needed.   levETIRAcetam (KEPPRA) 500 MG tablet   No No   Sig: Take 2 tablets (1,000 mg) by mouth 2 times daily   levothyroxine (SYNTHROID) 50 MCG tablet 2/12/2019 at am Self No Yes   Sig: Take 1 tablet by mouth daily.   multivitamin, therapeutic with minerals (MULTI-VITAMIN) TABS  Self Yes No   Sig: Take 1 tablet by mouth daily   ondansetron (ZOFRAN) 4 MG tablet   Yes Yes   Sig: Take 4-8 mg by mouth every 8 hours as needed for nausea   pantoprazole (PROTONIX) 40 MG EC tablet   Yes Yes   Sig: Take 40 mg by mouth daily      Facility-Administered Medications: None     Allergies   Allergies   Allergen Reactions     Droperidol      Other reaction(s): *Unknown - Follow up needed  Involuntary movements  Facial swelling and movements. Dystonic reaction       Buspirone      Ceclor [Cephalosporins]      Compazine      Side effects     Dihydroergotamine      Other reaction(s): *Unknown - Pt Doesn't Remember     Haloperidol      Other reaction(s): Angioedema     Penicillins      Phenergan [Promethazine Hcl] Rash     Prednisone Other (See Comments)     suicidal     Propranolol      Prozac [Fluoxetine]      Other reaction(s): *Unknown  Mouth swelling     Reglan [Metoclopramide]      Doesn't remember      Sumatriptan      Tessalon Perles [Benzonatate]      Zyprexa [Olanzapine] Unknown     Benzonatate Rash     Cefaclor Rash     Codeine Rash     Levothyroxine Rash       Physical Exam   Vital Signs: Temp: 98.7  F (37.1  C) Temp src: Oral BP: (!) 130/91 Pulse: 83   Resp: 16        Weight: 196 lbs 0 oz    Constitutional: awake, alert, cooperative, no apparent distress, and appears stated  age  Eyes: Pupils equal, round and reactive to light, extra ocular muscles intact, sclera clear, conjunctiva normal  ENT: Normocephalic, without obvious abnormality, atraumatic, sinuses nontender on palpation, oral pharynx with moist mucous membranes  Respiratory: No increased work of breathing, good air exchange, clear to auscultation bilaterally, no crackles or wheezing  Cardiovascular: regular rate and rhythm, normal S1 and S2, no S3 or S4, and no murmur noted  GI: No scars, normal bowel sounds, soft, non-distended, non-tender, no masses palpated  Skin: no bruising or bleeding, normal skin color, texture, turgor and no redness, warmth, or swelling  Musculoskeletal: There is no redness, warmth, or swelling of the joints.  Full range of motion noted.  Motor strength is 5 out of 5 all extremities bilaterally.  Tone is normal.  Neurologic: Awake, alert, oriented to name, place and time.  Cranial nerves II-XII are grossly intact.  Motor is 5 out of 5 bilaterally. Sensory is intact.    Neuropsychiatric: General: normal, calm and poor eye contact  Level of consciousness: drowsy  Affect: flat  Orientation: oriented to self, place, time and situation  Memory and insight: normal, memory for past and recent events intact and thought process normal    Data   Data reviewed today: I reviewed all medications, new labs and imaging results over the last 24 hours. I personally reviewed no images or EKG's today.    Recent Labs   Lab 02/14/19  1054   WBC 5.8   HGB 11.5*   MCV 83         POTASSIUM 3.8   CHLORIDE 110*   CO2 24   BUN 13   CR 0.74   ANIONGAP 8   CRYSTAL 8.5   *       No results found for this or any previous visit (from the past 24 hour(s)).

## 2019-02-14 NOTE — PLAN OF CARE
Pt stated med list from [facility] is not accurate. [facility] pharmacist interviewed pt to verify correct meds, but pharmacist stated is unable to complete med reconcile until tomorrow when nurse practitioner is available. Pharmacist stated that the outside pharmacy stated Cymbalta Rx had not been picked up by pt twice. On-call Dr west phoned regarding medications, and for tonight have ordered only Keppra, synthyroid, vitamins, and Protonix. Other meds will be verified tomorrow by pharmacist.
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
## 2019-02-15 VITALS
WEIGHT: 196 LBS | BODY MASS INDEX: 33.46 KG/M2 | SYSTOLIC BLOOD PRESSURE: 140 MMHG | RESPIRATION RATE: 16 BRPM | TEMPERATURE: 97.5 F | HEIGHT: 64 IN | DIASTOLIC BLOOD PRESSURE: 101 MMHG | HEART RATE: 69 BPM

## 2019-02-15 PROCEDURE — 25000132 ZZH RX MED GY IP 250 OP 250 PS 637: Mod: GY | Performed by: PSYCHIATRY & NEUROLOGY

## 2019-02-15 PROCEDURE — A9270 NON-COVERED ITEM OR SERVICE: HCPCS | Mod: GY | Performed by: PSYCHIATRY & NEUROLOGY

## 2019-02-15 PROCEDURE — 25000131 ZZH RX MED GY IP 250 OP 636 PS 637: Mod: GY | Performed by: PSYCHIATRY & NEUROLOGY

## 2019-02-15 RX ORDER — HYDROXYZINE HYDROCHLORIDE 25 MG/1
25 TABLET, FILM COATED ORAL EVERY 4 HOURS PRN
Qty: 90 TABLET | Refills: 0 | Status: ON HOLD | OUTPATIENT
Start: 2019-02-15 | End: 2019-08-20

## 2019-02-15 RX ORDER — ONDANSETRON 8 MG/1
8 TABLET, FILM COATED ORAL 2 TIMES DAILY PRN
Qty: 60 TABLET | Refills: 0 | Status: ON HOLD | OUTPATIENT
Start: 2019-02-15 | End: 2019-08-20

## 2019-02-15 RX ADMIN — ACETAMINOPHEN, ASPIRIN AND CAFFEINE 2 TABLET: 250; 250; 65 TABLET, FILM COATED ORAL at 09:10

## 2019-02-15 RX ADMIN — LEVETIRACETAM 1000 MG: 500 TABLET ORAL at 08:21

## 2019-02-15 RX ADMIN — PANTOPRAZOLE SODIUM 40 MG: 40 TABLET, DELAYED RELEASE ORAL at 08:22

## 2019-02-15 RX ADMIN — MULTIPLE VITAMINS W/ MINERALS TAB 1 TABLET: TAB at 08:21

## 2019-02-15 RX ADMIN — RIZATRIPTAN BENZOATE 10 MG: 10 TABLET, ORALLY DISINTEGRATING ORAL at 12:57

## 2019-02-15 RX ADMIN — VORTIOXETINE 5 MG: 5 TABLET, FILM COATED ORAL at 08:21

## 2019-02-15 RX ADMIN — OLANZAPINE 5 MG: 5 TABLET, ORALLY DISINTEGRATING ORAL at 00:30

## 2019-02-15 RX ADMIN — LEVOTHYROXINE SODIUM 50 MCG: 50 TABLET ORAL at 08:21

## 2019-02-15 RX ADMIN — HYDROXYZINE HYDROCHLORIDE 25 MG: 25 TABLET, FILM COATED ORAL at 02:38

## 2019-02-15 RX ADMIN — POLYETHYLENE GLYCOL 3350 17 G: 17 POWDER, FOR SOLUTION ORAL at 08:21

## 2019-02-15 RX ADMIN — ONDANSETRON 8 MG: 4 TABLET, FILM COATED ORAL at 08:22

## 2019-02-15 ASSESSMENT — ACTIVITIES OF DAILY LIVING (ADL)
LAUNDRY: WITH SUPERVISION
DRESS: STREET CLOTHES
HYGIENE/GROOMING: INDEPENDENT
ORAL_HYGIENE: INDEPENDENT

## 2019-02-15 NOTE — DISCHARGE SUMMARY
[facility] Psychiatric Discharge Summary      DATE OF ADMISSION: 2/13/2019     DATE OF DISCHARGE: 2/15/19    PRIMARY CARE PHYSICIAN: Theresa Hall    IDENTIFICATION: For history, see dictation by Dr. Horn on 2/14/19. For physical summary, see dictation by Ynes Donato MD on 2/12/19.     HOSPITAL COURSE:   This is a 44 year old recently  female with a significant history of anxiety, depression, and recurring migraines. She was presented to the Emergency Department for psychiatric evaluation after police were called to her [employer] [facility] group on 2/12/19. The patient emphasized to Dr. Horn in her initial interview that her recent divorce has been extremely hard on her. She has been attending grief support groups through her Religion for this. She had expressed attaining a severely depressed mood, motivation poor, concentration poor, low energy, hopeless, helpless, and worthless. Along with this she does endorse symptoms of anxiety and PTSD. After reviewing the benefits and possible side effects of the medication Trintellix with patient, she was in agreement with trying the medication. Dr. Horn has started the patient on Trintellix 5mg daily and ordered Zofran 8mg BID PRN in order to address the patients history of nausea on 2/14. The patient was in agreement with these medication adjustments. She tolerated these medications changes well and is hopeful for her future.     DISCHARGE MENTAL STATUS EXAMINATION:  Appearance Sitting in chair, dressed in hospital scrubs. Appears stated age.   Attitude Cooperative   Orientation Oriented to person, place, time   Eye Contact Good   Speech Regular rate, rhythm, volume and tone   Language Normal   Psychomotor Behavior Normal   Mood Less depressed   Affect More pleasant    Thought Process Goal-Oriented, Intact   Associations Intact   Thought Content Patient is currently negative for suicidal ideation, negative for plan or  "intent, able to contract no self harm and identify barriers to suicide.  Negative for obsessions, compulsions or psychosis.     Fund of Knowledge Intact   Insight Fair   Judgement Fair   Attention Span & Concentration Intact   Recent & Remote Memory Intact   Gait Normal   Muscle Tone Intact       LABORATORY DATA:    Refer to hospitalist admission dictation.  Recent Results (from the past 24 hour(s))   Basic metabolic panel    Collection Time: 02/14/19 10:54 AM   Result Value Ref Range    Sodium 142 133 - 144 mmol/L    Potassium 3.8 3.4 - 5.3 mmol/L    Chloride 110 (H) 94 - 109 mmol/L    Carbon Dioxide 24 20 - 32 mmol/L    Anion Gap 8 3 - 14 mmol/L    Glucose 103 (H) 70 - 99 mg/dL    Urea Nitrogen 13 7 - 30 mg/dL    Creatinine 0.74 0.52 - 1.04 mg/dL    GFR Estimate >90 >60 mL/min/[1.73_m2]    GFR Estimate If Black >90 >60 mL/min/[1.73_m2]    Calcium 8.5 8.5 - 10.1 mg/dL   CBC with platelets    Collection Time: 02/14/19 10:54 AM   Result Value Ref Range    WBC 5.8 4.0 - 11.0 10e9/L    RBC Count 4.25 3.8 - 5.2 10e12/L    Hemoglobin 11.5 (L) 11.7 - 15.7 g/dL    Hematocrit 35.2 35.0 - 47.0 %    MCV 83 78 - 100 fl    MCH 27.1 26.5 - 33.0 pg    MCHC 32.7 31.5 - 36.5 g/dL    RDW 12.5 10.0 - 15.0 %    Platelet Count 270 150 - 450 10e9/L   TSH with free T4 reflex    Collection Time: 02/14/19 10:54 AM   Result Value Ref Range    TSH 0.94 0.40 - 4.00 mU/L   Drug abuse screen 77 urine (FL, RH, SH)    Collection Time: 02/14/19 12:30 PM   Result Value Ref Range    Amphetamine Qual Urine Negative NEG^Negative    Barbiturates Qual Urine Negative NEG^Negative    Benzodiazepine Qual Urine Negative NEG^Negative    Cannabinoids Qual Urine Negative NEG^Negative    Cocaine Qual Urine Negative NEG^Negative    Opiates Qualitative Urine Negative NEG^Negative    PCP Qual Urine Negative NEG^Negative        BP (!) 140/101   Pulse 69   Temp 97.5  F (36.4  C) (Oral)   Resp 16   Ht 1.626 m (5' 4\")   Wt 88.9 kg (196 lb)   LMP 01/26/2019   " BMI 33.64 kg/m       DISCHARGE MEDICATIONS:      Review of your medicines      START taking      Dose / Directions   hydrOXYzine 25 MG tablet  Commonly known as:  ATARAX  Used for:  PTSD (post-traumatic stress disorder)      Dose:  25 mg  Take 1 tablet (25 mg) by mouth every 4 hours as needed for anxiety  Quantity:  90 tablet  Refills:  0     vortioxetine 5 MG tablet  Commonly known as:  TRINTELLIX/BRINTELLIX  Used for:  PTSD (post-traumatic stress disorder)      Dose:  5 mg  Start taking on:  2/16/2019  Take 1 tablet (5 mg) by mouth daily  Quantity:  30 tablet  Refills:  0        CONTINUE these medicines which may have CHANGED, or have new prescriptions. If we are uncertain of the size of tablets/capsules you have at home, strength may be listed as something that might have changed.      Dose / Directions   ondansetron 8 MG tablet  Commonly known as:  ZOFRAN  This may have changed:      medication strength    how much to take    when to take this    reasons to take this  Used for:  PTSD (post-traumatic stress disorder)      Dose:  8 mg  Take 1 tablet (8 mg) by mouth 2 times daily as needed for nausea or vomiting  Quantity:  60 tablet  Refills:  0        CONTINUE these medicines which have NOT CHANGED      Dose / Directions   acetaminophen 500 MG tablet  Commonly known as:  TYLENOL      Dose:  1000 mg  Take 1,000 mg by mouth every 6 hours as needed for pain  Refills:  0     aspirin-acetaminophen-caffeine 250-250-65 MG tablet  Commonly known as:  EXCEDRIN MIGRAINE      Dose:  2 tablet  Take 2 tablets by mouth every 4 hours as needed for headaches (Migraine)  Refills:  0     cetirizine 10 MG tablet  Commonly known as:  zyrTEC  Indication:  Hayfever      Dose:  10 mg  Take 10 mg by mouth daily  Refills:  0     diphenhydrAMINE 25 MG tablet  Commonly known as:  BENADRYL      Dose:  25-50 mg  Take 25-50 mg by mouth every 6 hours as needed for allergies (Seasonal allergies)  Refills:  0     fluticasone 50 MCG/ACT nasal  spray  Commonly known as:  FLONASE  Used for:  Seasonal allergic rhinitis      Dose:  1-2 spray  Spray 1-2 sprays into both nostrils daily as needed.  Quantity:  1 Package  Refills:  2     ibuprofen 200 MG tablet  Commonly known as:  ADVIL/MOTRIN      Dose:  200 mg  Take 200 mg by mouth every 4 hours as needed for mild pain or fever  Refills:  0     levETIRAcetam 500 MG tablet  Commonly known as:  KEPPRA  Used for:  Seizure disorder (H)      Dose:  1000 mg  Take 2 tablets (1,000 mg) by mouth 2 times daily  Quantity:  360 tablet  Refills:  3     levothyroxine 50 MCG tablet  Commonly known as:  SYNTHROID  Used for:  Hypothyroidism      Dose:  50 mcg  Take 1 tablet by mouth daily.  Quantity:  90 tablet  Refills:  1     MELATONIN PO      Dose:  10 mg  Take 10 mg by mouth nightly as needed (sleep)  Refills:  0     Multi-vitamin tablet      Dose:  1 tablet  Take 1 tablet by mouth daily  Refills:  0     naproxen 500 MG tablet  Commonly known as:  NAPROSYN      Dose:  500 mg  Take 500 mg by mouth 2 times daily as needed for moderate pain Take with food  Refills:  0     ofloxacin 0.3 % ophthalmic solution  Commonly known as:  OCUFLOX      Dose:  1-2 drop  Place 1-2 drops into both eyes daily as needed (Dry eyes)  Refills:  0     pantoprazole 40 MG EC tablet  Commonly known as:  PROTONIX      Dose:  40 mg  Take 40 mg by mouth daily  Refills:  0     polyethylene glycol packet  Commonly known as:  MIRALAX/GLYCOLAX      Dose:  1 packet  Take 1 packet by mouth daily  Refills:  0     rizatriptan 10 MG ODT  Commonly known as:  MAXALT-MLT      Dose:  10 mg  Take 10 mg by mouth at onset of headache for migraine Two dose per 24 hr  Refills:  0     TORADOL IM      Dose:  60 mg  Inject 60 mg into the muscle as needed (Migraine)  Refills:  0        STOP taking    diazepam 5 MG tablet  Commonly known as:  VALIUM        DULoxetine 60 MG capsule  Commonly known as:  CYMBALTA              Where to get your medicines      These medications  were sent to [facility] [place], [place] - [address]  [address], [place] [address]    Phone:  [phone]     hydrOXYzine 25 MG tablet    ondansetron 8 MG tablet         DISCHARGE DIAGNOSES:  1. Major depression, recurrent, severe, without psychotic features.  2. Anxiety Disorder, NOS  3. PTSD    DISCHARGE PLAN:   1. Continue current medication regiment   2. Re-schedule psychiatrist and therapist appointments    DISCHARGE FOLLOW-UP:  See Reconciliation Note    Attestation:   Patient has been seen and evaluated by me, Kilo Horn MD.    Patient ID:    Name: Iva Valencia MRN: [id number]  Admission: 2/13/2019  YOB: 1975

## 2019-02-15 NOTE — PHARMACY-ADMISSION MEDICATION HISTORY
Admission Medication History    Admission medication history interview status for the 2/13/2019 admission is complete.     Med list provided by patient's Homecare Rn.      Prior to Admission medications    Medication Sig Last Dose Taking? Auth Provider   acetaminophen (TYLENOL) 500 MG tablet Take 1,000 mg by mouth every 6 hours as needed for pain  at PRN Yes Unknown, Entered By History   aspirin-acetaminophen-caffeine (EXCEDRIN MIGRAINE) 250-250-65 MG tablet Take 2 tablets by mouth every 4 hours as needed for headaches (Migraine)  at PRN Yes Unknown, Entered By History   cetirizine (ZYRTEC) 10 MG tablet Take 10 mg by mouth daily  Yes Unknown, Entered By History   diazepam (VALIUM) 5 MG tablet Take 5 mg by mouth every 6 hours as needed for anxiety  at PRN Yes Unknown, Entered By History   diphenhydrAMINE (BENADRYL) 25 MG tablet Take 25-50 mg by mouth every 6 hours as needed for allergies (Seasonal allergies)  at PRN Yes Unknown, Entered By History   DULoxetine (CYMBALTA) 60 MG capsule Take 120 mg by mouth daily  Yes Unknown, Entered By History   fluticasone (FLONASE) 50 MCG/ACT nasal spray Spray 1-2 sprays into both nostrils daily as needed.  at PRN Yes Maya Ta MD   ibuprofen (ADVIL/MOTRIN) 200 MG tablet Take 200 mg by mouth every 4 hours as needed for mild pain or fever  at PRN Yes Unknown, Entered By History   Ketorolac Tromethamine (TORADOL IM) Inject 60 mg into the muscle as needed (Migraine)   at PRN Yes Reported, Patient   levETIRAcetam (KEPPRA) 500 MG tablet Take 2 tablets (1,000 mg) by mouth 2 times daily  Yes Jon Boss MD   levothyroxine (SYNTHROID) 50 MCG tablet Take 1 tablet by mouth daily. 2/12/2019 at am Yes Maya Ta MD   MELATONIN PO Take 10 mg by mouth nightly as needed (sleep)   at PRN Yes Reported, Patient   multivitamin, therapeutic with minerals (MULTI-VITAMIN) TABS Take 1 tablet by mouth daily  Yes Reported, Patient   naproxen (NAPROSYN) 500 MG tablet Take  500 mg by mouth 2 times daily as needed for moderate pain Take with food  at PRN Yes Unknown, Entered By History   ofloxacin (OCUFLOX) 0.3 % ophthalmic solution Place 1-2 drops into both eyes daily as needed (Dry eyes)  at PRN Yes Unknown, Entered By History   ondansetron (ZOFRAN) 4 MG tablet Take 4-8 mg by mouth every 8 hours as needed for nausea  at PRN Yes Unknown, Entered By History   pantoprazole (PROTONIX) 40 MG EC tablet Take 40 mg by mouth daily  Yes Unknown, Entered By History   polyethylene glycol (MIRALAX/GLYCOLAX) packet Take 1 packet by mouth daily  Yes Unknown, Entered By History   rizatriptan (MAXALT-MLT) 10 MG ODT Take 10 mg by mouth at onset of headache for migraine Two dose per 24 hr  at PRN Yes Unknown, Entered By History       Les Saba, PharmD

## 2019-02-15 NOTE — PROGRESS NOTES
02/15/19 1300   General Information   Has Not Attended OT as of: 02/15/19     Pt has not attended OT since admit.  Will continue to encourage participation and completion of  self-assessment as able. OT staff will explain the purpose of being involved with treatment plan and provide options to meet current needs and goals.
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
## 2019-02-15 NOTE — DISCHARGE INSTRUCTIONS
Behavioral Discharge Planning and Instructions    Summary:  Admitted for worsening depression and anxiety.     Main Diagnosis:   Major Depression, recurrent, severe, without psychotic features; Anxiety Disorder, NOS; PTSD    Major Treatments, Procedures and Findings:  Psychiatric assessment. Medication adjustment.     Symptoms to Report: Feeling more agitated, Losing more sleep, Mood getting worse or Thoughts of suicide    Lifestyle Adjustment:  Follow all treatment recommendations. Develop and follow safety plan. Utilize positive coping skills to manage symptoms of depression and anxiety.     Psychiatry Follow-up:     You have a follow up psychiatry appointment with Rhonda Tan PA-C at [facility] in [place] on Friday, February 22, 2019 at 2:00 pm.     [facility] ([facility])  [address]  [place]  [address]  Phone:  [phone] / Fax:  [phone]    The psychiatrist is recommending that you continue with your in-home therapist at this time.  If you would like to consider other options for therapy to address your trauma, you are encouraged to call [facility] to see if this is a good fit for you.     [facility]  [address] [place] [address]  [phone]     Resources:   Crisis Intervention: 817.455.8239 or 446-156-9852 (TTY: 445.654.6182).  Call anytime for help.  National [place] on Mental Illness (www.[place].prabhjot.org): 272.669.5876 or 609-044-4462.  National Suicide Prevention Line (www.mentalhealthmn.org): 332-881-MNAK (8161)  [facility] Crisis Response Unit at 126-002-8736 - Provides 24-hour telephone or on-site response, as well as referrals, to residents in a mental health crisis.     General Medication Instructions:   See your medication sheet(s) for instructions.   Take all medicines as directed.  Make no changes unless your doctor suggests them.   Go to all your doctor visits.  Be sure to have all your required lab  tests. This way, your medicines can be refilled on time.  Do not use any drugs not prescribed by your doctor.  Avoid alcohol.

## 2019-02-15 NOTE — PLAN OF CARE
"[facility]; also received a phone call from \"[place]\" verifying that we received fax. Our pharmacist contacted to reconcile and update med list in computer. Paper copy placed in chart. Pt has flat affect, appears sad.    Addendum@ 2120: Dr Horn contacted regarding updated med list and ordered meds. Pt given Excedrin for headache and cool cloth for forehead. Pt declined evening groups. Pt denies suicidal thoughts when asked.  "

## 2019-02-15 NOTE — PROGRESS NOTES
Patient discharged denies suicidal ideation and has stable mood. Instructions gone over with patient regarding medications and follow up plan.  . Copies of discharge instructions ( After Visit Summary) given to patient.Thirty day supply of medications to be sent home with patient. Pt left the unit at 1415. Father to pick pt up at ED.

## 2019-02-15 NOTE — PROGRESS NOTES
[facility] Psychiatric Progress Note       Interim History   The patient's care was discussed with the treatment team and chart notes were reviewed. Per attending staff members, the patient has been pleasant, cooperative, and medication compliant. It was noted that the patient attained little sleep last night, only receiving around four hours total. Pt seen on 2/15/19 by Dr. Horn. She declares she has tolerated the new medication changes made yesterday, the start of Trintellix. She did notice some nausea, however after taking the Zofran 8mg BID PRN, she felt much better and the nausea subsided. She feels ready to be discharged and care for her mental health as outpatient. Dr. Horn is in agreement with this idea, thus the patient will be able to be discharged today.      Hospital Course   This is a 44 year old recently  female with a significant history of anxiety, depression, and recurring migraines. She was presented to the Emergency Department for psychiatric evaluation after police were called to her [facility] group on 2/12/19. The patient emphasized to Dr. Horn in her initial interview that her recent divorce has been extremely hard on her. She has been attending grief support groups through her [employer] for this. She had expressed attaining a severely depressed mood, motivation poor, concentration poor, low energy, hopeless, helpless, and worthless. Along with this she does endorse symptoms of anxiety and PTSD. After reviewing the benefits and possible side effects of the medication Trintellix with patient, she was in agreement with trying the medication. Dr. Horn has started the patient on Trintellix 5mg daily and ordered Zofran 8mg BID PRN in order to address the patients history of nausea. The patient is in agreement with these medication adjustments.      Medications     Current Facility-Administered Medications Ordered in Epic   Medication Dose Route  Frequency Last Rate Last Dose     acetaminophen (TYLENOL) tablet 1,000 mg  1,000 mg Oral Q6H PRN         aspirin-acetaminophen-caffeine (EXCEDRIN MIGRAINE) per tablet 2 tablet  2 tablet Oral Q4H PRN   2 tablet at 02/14/19 2053     cetirizine (zyrTEC) tablet 10 mg  10 mg Oral Daily         diphenhydrAMINE (BENADRYL) capsule 25-50 mg  25-50 mg Oral Q6H PRN   50 mg at 02/14/19 2253     fluticasone (FLONASE) 50 MCG/ACT spray 1-2 spray  1-2 spray Both Nostrils Daily PRN         hydrOXYzine (ATARAX) tablet 25 mg  25 mg Oral Q4H PRN   25 mg at 02/15/19 0238     ibuprofen (ADVIL/MOTRIN) tablet 400 mg  400 mg Oral Q6H PRN         levETIRAcetam (KEPPRA) tablet 1,000 mg  1,000 mg Oral BID   1,000 mg at 02/14/19 2057     levothyroxine (SYNTHROID/LEVOTHROID) tablet 50 mcg  50 mcg Oral QAM AC   50 mcg at 02/14/19 0842     melatonin tablet 5 mg  5 mg Oral At Bedtime PRN   5 mg at 02/14/19 2129     multivitamin w/minerals (THERA-VIT-M) tablet 1 tablet  1 tablet Oral Daily   1 tablet at 02/14/19 0842     naproxen (NAPROSYN) tablet 500 mg  500 mg Oral BID PRN         OLANZapine zydis (zyPREXA) ODT tab 5 mg  5 mg Oral Q6H PRN   5 mg at 02/15/19 0030     ondansetron (ZOFRAN) tablet 8 mg  8 mg Oral BID PRN   8 mg at 02/14/19 1633     pantoprazole (PROTONIX) EC tablet 40 mg  40 mg Oral Daily   40 mg at 02/14/19 0841     polyethylene glycol (MIRALAX/GLYCOLAX) Packet 17 g  17 g Oral Daily         rizatriptan (MAXALT-MLT) ODT tab 10 mg  10 mg Oral at onset of headache         sodium chloride (OCEAN) 0.65 % nasal spray 1-2 spray  1-2 spray Nasal Q1H PRN         vortioxetine (TRINTELLIX/BRINTELLIX) tablet 5 mg  5 mg Oral Daily   5 mg at 02/14/19 1448     No current Epic-ordered outpatient medications on file.         Allergies      Allergies   Allergen Reactions     Droperidol      Other reaction(s): *Unknown - Follow up needed  Involuntary movements  Facial swelling and movements. Dystonic reaction       Buspirone      Zenaida  "[Cephalosporins]      Compazine      Side effects     Dihydroergotamine      Other reaction(s): *Unknown - Pt Doesn't Remember     Haloperidol      Other reaction(s): Angioedema     Penicillins      Phenergan [Promethazine Hcl] Rash     Prednisone Other (See Comments)     suicidal     Propranolol      Prozac [Fluoxetine]      Other reaction(s): *Unknown  Mouth swelling     Reglan [Metoclopramide]      Doesn't remember      Sumatriptan      Tessalon Perles [Benzonatate]      Zyprexa [Olanzapine] Unknown     Benzonatate Rash     Cefaclor Rash     Codeine Rash     Levothyroxine Rash        Medical Review of Systems     BP (!) 153/106   Pulse 68   Temp 98.7  F (37.1  C)   Resp 16   Ht 1.626 m (5' 4\")   Wt 88.9 kg (196 lb)   LMP 01/26/2019   BMI 33.64 kg/m    Body mass index is 33.64 kg/m .  A 10-point review of systems was performed by Kilo Horn MD and is negative, no new findings.      Psychiatric Examination     Appearance Sitting in chair, dressed in hospital scrubs. Appears stated age.   Attitude Cooperative   Orientation Oriented to person, place, time   Eye Contact Good   Speech Regular rate, rhythm, volume and tone   Language Normal   Psychomotor Behavior Normal   Mood Less depressed   Affect Flat, but more pleasant    Thought Process Goal-Oriented, Intact   Associations Intact   Thought Content Patient is currently negative for suicidal ideation, negative for plan or intent, able to contract no self harm and identify barriers to suicide.  Negative for obsessions, compulsions or psychosis.     Fund of Knowledge Intact   Insight Fair   Judgement Fair   Attention Span & Concentration Intact   Recent & Remote Memory Intact   Gait Normal   Muscle Tone Intact        Labs     Labs reviewed.  Recent Results (from the past 24 hour(s))   Basic metabolic panel    Collection Time: 02/14/19 10:54 AM   Result Value Ref Range    Sodium 142 133 - 144 mmol/L    Potassium 3.8 3.4 - 5.3 mmol/L    Chloride 110 " (H) 94 - 109 mmol/L    Carbon Dioxide 24 20 - 32 mmol/L    Anion Gap 8 3 - 14 mmol/L    Glucose 103 (H) 70 - 99 mg/dL    Urea Nitrogen 13 7 - 30 mg/dL    Creatinine 0.74 0.52 - 1.04 mg/dL    GFR Estimate >90 >60 mL/min/[1.73_m2]    GFR Estimate If Black >90 >60 mL/min/[1.73_m2]    Calcium 8.5 8.5 - 10.1 mg/dL   CBC with platelets    Collection Time: 02/14/19 10:54 AM   Result Value Ref Range    WBC 5.8 4.0 - 11.0 10e9/L    RBC Count 4.25 3.8 - 5.2 10e12/L    Hemoglobin 11.5 (L) 11.7 - 15.7 g/dL    Hematocrit 35.2 35.0 - 47.0 %    MCV 83 78 - 100 fl    MCH 27.1 26.5 - 33.0 pg    MCHC 32.7 31.5 - 36.5 g/dL    RDW 12.5 10.0 - 15.0 %    Platelet Count 270 150 - 450 10e9/L   TSH with free T4 reflex    Collection Time: 02/14/19 10:54 AM   Result Value Ref Range    TSH 0.94 0.40 - 4.00 mU/L   Drug abuse screen 77 urine (FL, RH, SH)    Collection Time: 02/14/19 12:30 PM   Result Value Ref Range    Amphetamine Qual Urine Negative NEG^Negative    Barbiturates Qual Urine Negative NEG^Negative    Benzodiazepine Qual Urine Negative NEG^Negative    Cannabinoids Qual Urine Negative NEG^Negative    Cocaine Qual Urine Negative NEG^Negative    Opiates Qualitative Urine Negative NEG^Negative    PCP Qual Urine Negative NEG^Negative        Impression   This is a 44 year old recently  female with a significant history of anxiety, depression, and recurring migraines. He was presented to the Emergency Department for psychiatric evaluation after police were called to her [facility] group on 2/12/19. Since being admitted to [facility], the patient has been medication compliant, cooperative, attending group sessions, and pleasant throughout SDU. The patient has tolerated the new medication, Trintellix, started yesterday. She has denied any side effects, concerns, or complications. The patient feels she is ready to return home and continue with her outpatient care such as seeing her therapist weekly and psychiatrist.   Kory is in agreement with this idea, thus the patient will be discharged today.      Diagnoses   1. Major depression, recurrent, severe, without psychotic features.  2. Anxiety Disorder, NOS  3. PTSD   Plan     1. Explained side effects, benefits, and complications of medications to the patient, Pt gave verbal consent.  2. Medication changes: No medication changes made  3. Discussed treatment plan with patient and team.  4. Projected length of stay: discharged today      Attestation:   Patient has been seen and evaluated by me, Kilo Horn MD.    Patient ID:  Name: Iva Valencia    MRN: [id number]  Admission: 2/13/2019   YOB: 1975

## 2019-02-17 ENCOUNTER — HOSPITAL ENCOUNTER (EMERGENCY)
Facility: CLINIC | Age: 44
Discharge: HOME | End: 2019-02-17
Attending: EMERGENCY MEDICINE | Admitting: EMERGENCY MEDICINE
Payer: MEDICARE

## 2019-02-17 VITALS
DIASTOLIC BLOOD PRESSURE: 89 MMHG | BODY MASS INDEX: 32.44 KG/M2 | SYSTOLIC BLOOD PRESSURE: 138 MMHG | HEART RATE: 89 BPM | RESPIRATION RATE: 20 BRPM | OXYGEN SATURATION: 97 % | HEIGHT: 64 IN | WEIGHT: 190 LBS | TEMPERATURE: 99.1 F

## 2019-02-17 DIAGNOSIS — F41.0 ANXIETY ATTACK: ICD-10-CM

## 2019-02-17 LAB
ANION GAP SERPL CALCULATED.3IONS-SCNC: 9 MMOL/L (ref 3–14)
BASOPHILS # BLD AUTO: 0.1 10E9/L (ref 0–0.2)
BASOPHILS NFR BLD AUTO: 0.7 %
BUN SERPL-MCNC: 16 MG/DL (ref 7–30)
CALCIUM SERPL-MCNC: 9.2 MG/DL (ref 8.5–10.1)
CHLORIDE SERPL-SCNC: 107 MMOL/L (ref 94–109)
CO2 SERPL-SCNC: 24 MMOL/L (ref 20–32)
CREAT SERPL-MCNC: 1.09 MG/DL (ref 0.52–1.04)
DIFFERENTIAL METHOD BLD: NORMAL
EOSINOPHIL # BLD AUTO: 0 10E9/L (ref 0–0.7)
EOSINOPHIL NFR BLD AUTO: 0.1 %
ERYTHROCYTE [DISTWIDTH] IN BLOOD BY AUTOMATED COUNT: 12.3 % (ref 10–15)
GFR SERPL CREATININE-BSD FRML MDRD: 63 ML/MIN/{1.73_M2}
GLUCOSE SERPL-MCNC: 108 MG/DL (ref 70–99)
HCT VFR BLD AUTO: 38.9 % (ref 35–47)
HGB BLD-MCNC: 12.9 G/DL (ref 11.7–15.7)
IMM GRANULOCYTES # BLD: 0 10E9/L (ref 0–0.4)
IMM GRANULOCYTES NFR BLD: 0.4 %
LYMPHOCYTES # BLD AUTO: 1.7 10E9/L (ref 0.8–5.3)
LYMPHOCYTES NFR BLD AUTO: 16.1 %
MCH RBC QN AUTO: 27.2 PG (ref 26.5–33)
MCHC RBC AUTO-ENTMCNC: 33.2 G/DL (ref 31.5–36.5)
MCV RBC AUTO: 82 FL (ref 78–100)
MONOCYTES # BLD AUTO: 1.2 10E9/L (ref 0–1.3)
MONOCYTES NFR BLD AUTO: 11.1 %
NEUTROPHILS # BLD AUTO: 7.5 10E9/L (ref 1.6–8.3)
NEUTROPHILS NFR BLD AUTO: 71.6 %
NRBC # BLD AUTO: 0 10*3/UL
NRBC BLD AUTO-RTO: 0 /100
PLATELET # BLD AUTO: 395 10E9/L (ref 150–450)
POTASSIUM SERPL-SCNC: 4.2 MMOL/L (ref 3.4–5.3)
RBC # BLD AUTO: 4.75 10E12/L (ref 3.8–5.2)
SODIUM SERPL-SCNC: 140 MMOL/L (ref 133–144)
WBC # BLD AUTO: 10.5 10E9/L (ref 4–11)

## 2019-02-17 PROCEDURE — 25000131 ZZH RX MED GY IP 250 OP 636 PS 637: Mod: GY | Performed by: EMERGENCY MEDICINE

## 2019-02-17 PROCEDURE — 99283 EMERGENCY DEPT VISIT LOW MDM: CPT

## 2019-02-17 PROCEDURE — 25000132 ZZH RX MED GY IP 250 OP 250 PS 637: Mod: GY | Performed by: EMERGENCY MEDICINE

## 2019-02-17 PROCEDURE — 85025 COMPLETE CBC W/AUTO DIFF WBC: CPT | Performed by: EMERGENCY MEDICINE

## 2019-02-17 PROCEDURE — A9270 NON-COVERED ITEM OR SERVICE: HCPCS | Mod: GY | Performed by: EMERGENCY MEDICINE

## 2019-02-17 PROCEDURE — 80048 BASIC METABOLIC PNL TOTAL CA: CPT | Performed by: EMERGENCY MEDICINE

## 2019-02-17 RX ORDER — ONDANSETRON 4 MG/1
4 TABLET, ORALLY DISINTEGRATING ORAL ONCE
Status: COMPLETED | OUTPATIENT
Start: 2019-02-17 | End: 2019-02-17

## 2019-02-17 RX ORDER — LORAZEPAM 0.5 MG/1
1 TABLET ORAL ONCE
Status: COMPLETED | OUTPATIENT
Start: 2019-02-17 | End: 2019-02-17

## 2019-02-17 RX ORDER — ONDANSETRON 4 MG/1
4 TABLET, ORALLY DISINTEGRATING ORAL EVERY 8 HOURS PRN
Qty: 15 TABLET | Refills: 0 | Status: ON HOLD | OUTPATIENT
Start: 2019-02-17 | End: 2019-08-20

## 2019-02-17 RX ADMIN — ONDANSETRON 4 MG: 4 TABLET, ORALLY DISINTEGRATING ORAL at 21:14

## 2019-02-17 RX ADMIN — LORAZEPAM 1 MG: 0.5 TABLET ORAL at 21:21

## 2019-02-17 ASSESSMENT — ENCOUNTER SYMPTOMS: NERVOUS/ANXIOUS: 1

## 2019-02-17 ASSESSMENT — MIFFLIN-ST. JEOR: SCORE: 1506.83

## 2019-02-17 NOTE — ED AVS SNAPSHOT
Emergency Department  [address] [facility] [address]  Phone:  [phone]  Fax:  [phone]                                    Iva Valencia   MRN: [id number]    Department:   Emergency Department   Date of Visit:  2/17/2019           After Visit Summary Signature Page    I have received my discharge instructions, and my questions have been answered. I have discussed any challenges I see with this plan with the nurse or doctor.    ..........................................................................................................................................  Patient/Patient Representative Signature      ..........................................................................................................................................  Patient Representative Print Name and Relationship to Patient    ..................................................               ................................................  Date                                   Time    ..........................................................................................................................................  Reviewed by Signature/Title    ...................................................              ..............................................  Date                                               Time          22EPIC Rev 08/18

## 2019-02-18 NOTE — ED PROVIDER NOTES
History     Chief Complaint:  Anxiety     HPI   Iva Valencia is a 44 year old female, with a history of anxiety, depression, and PTSD, who presents with anxiety. The patient states that she has been having multiple life stressors going on including a recent divorce. The patient states that she was at her [facility] group on 2/12 when the police were called after a situation had escalated during the support group time and was then brought to the ED. She was admitted under the care of Dr. Horn and discharged on 2/13 with a prescription for Trintellix 5 mg daily. The patient states that she continues to feel anxious and additionally states that her ex  and his girlfriend have been reaching out to her, making it difficult to control of her anxiety. Additionally, the patient states that she has had 3 days of menstrual bleeding, but notes it is earlier then expected as her last menstrual period was on 1/26, prompting her ED visit.    Allergies:  Droperidol  Buspirone  Ceclor [Cephalosporins]  Compazine  Dihydroergotamine  Haloperidol  Penicillins  Phenergan [Promethazine Hcl]  Prednisone  Propranolol  Prozac [Fluoxetine]  Reglan [Metoclopramide]  Sumatriptan  Tessalon Perles [Benzonatate]  Zyprexa [Olanzapine]  Benzonatate  Cefaclor  Codeine  Levothyroxine      Medications:    Excedrin  Zyrtec  Flonase  Atarax  Levothyroxine  Vortioxetine    Past Medical History:    Anemia  Anxiety  Depression  Fibromyalgia  GERD  Herpes simplex  Hypothyroidism  Migraine  Seizures    Past Surgical History:    Arthroscopy ankle  Cholecystectomy  Cryotherapy, cervical  D and C  Foot surgery  Heel ulcer treatment  PE tubes  Retrocalcaneal bursitis and Sarah deformity treatment  Tonsillectomy    Family History:    Sjogrens  Obesity  Depression  Hyperlipidemia  Bipolar disorder    Social History:  Presents with her mother.   Former Smoker - quit 1998  Negative for alcohol use.   Marital Status:   [2]     Review of  "Systems   Genitourinary: Positive for vaginal bleeding.   Psychiatric/Behavioral: The patient is nervous/anxious.    All other systems reviewed and are negative.      Physical Exam   First Vitals:  BP: (!) 157/97  Pulse: 117  Temp: 99.1  F (37.3  C)  Resp: 22  Height: 162.6 cm (5' 4\")  Weight: 86.2 kg (190 lb)  SpO2: 96 %    Physical Exam  General: Tearful. Well appearing, nontoxic.  Head:  Scalp, face, and head appear normal  Eyes:  Pupils are equal, round    Conjunctivae non-injected and sclerae white  ENT:    The external nose is normal    Pinnae are normal    The oropharynx is normal, mucous membranes moist    Uvula is in the midline  Neck:  Normal range of motion    There is no rigidity noted    Trachea is in the midline  CV:  Tachycardic rate, regular rhythm      Normal S1/S2, no S3/S4    No murmur or rub. Radial pulses 2+ bilaterally   Resp:  Lungs are clear and equal bilaterally    There is no tachypnea    No increased work of breathing    No rales, wheezing, or rhonchi  GI:  Abdomen is soft, obese, no rigidity or guarding    No distension, or mass    No tenderness or rebound tenderness   MS:  Normal muscular tone    Symmetric motor strength    No lower extremity edema  Skin:  Small scattered old appearing ecchymoses to the bilateral forearms.  Neuro: Awake and alert. Strength 5/5 and intact. Gait normal.    Speech is normal and fluent    Moves all extremities spontaneously  Psych:  Tearful, labile and anxious affect. Reports multiple psychosocial stressors and difficulty coping. Denies SI/HI. No psychomotor agitation.     Emergency Department Course   Laboratory:  CBC: WBC: 10.5, HGB: 12.9, PLT: 395  BMP: Glucose 108 (H), Creatinine: 1.09 (H), o/w WNL    Interventions:   2114 Zofran ODT, 4 mg, PO  2121 Ativan, 1 mg, PO    Emergency Department Course:  Nursing notes and vitals reviewed.     2113  I performed an exam of the patient as documented above.     Medicine administered as documented above. Blood " drawn. This was sent to the lab for further testing, results above.    2219 I rechecked the patient and discussed the results of her workup thus far.     Findings and plan explained to the Patient. Patient discharged home with instructions regarding supportive care, medications, and reasons to return. The importance of close follow-up was reviewed. The patient was prescribed Zofran.     I personally reviewed the laboratory results with the Patient and answered all related questions prior to discharge.         Impression & Plan      Medical Decision Making:  Iva Valencia is a 44 year old female who presents for evaluation of anxiety.  There is a history of anxiety in the past and they are on medications.  She feels improved after interventions as noted above in the Emergency Department.  There is no signs at this point of a general medical problem causing anxiety (PE, hyperthyroidism, other metabolic derangement, infection, etc).  There are no signs of serious decompensation warranting psychiatric hospitalization or 72 hold (no suicidal or homicidal ideation, no danger to self).  Supportive outpatient management is therefore indicated.  Patient inquired about increasing her Trintillex. She was started on the lowest dose at 5mg and she feels this is not enough to help control her symptoms. She has been tolerating the medication well. She reports she has a psychiatry follow up appointment on Friday. I have discussed with her that she could increase her dose to 10mg daily but if she were to experience any side effects she should decrease back to 5mg until she sees her psychiatrist. Patient also concerned by an abnormal menstrual period which has come early. She denies any significant abdominal or pelvic pain. Labs in the ED are reassuring. I have recommended outpatient follow up with gynecology. This is consistent with dysfunctional uterine bleeding. Return precautions were discussed with patient. The patient's  questions were answered and the patient was agreeable with discharge.    Diagnosis:    ICD-10-CM    1. Anxiety attack F41.0 Basic metabolic panel       Disposition:  discharged to home    Discharge Medications:   Details   ondansetron (ZOFRAN ODT) 4 MG ODT tab Take 1 tablet (4 mg) by mouth every 8 hours as needed for nausea or vomiting, Disp-15 tablet, R-0, Local PrintMay substitute non-ODT formulation per patient preference/insurance coverage.       I, Quin Dan, am serving as a scribe on 2/17/2019 at 9:13 PM to personally document services performed by Bharathi Reyes MD based on my observations and the provider's statements to me.      Quin Dan  2/17/2019    EMERGENCY DEPARTMENT       Bharathi Reyes MD  02/18/19 [id number]

## 2019-03-10 DIAGNOSIS — G40.109 PARTIAL EPILEPSY (H): Primary | ICD-10-CM

## 2019-03-14 RX ORDER — LEVETIRACETAM 250 MG/1
TABLET ORAL
Qty: 240 TABLET | Refills: 0 | Status: SHIPPED | OUTPATIENT
Start: 2019-03-14 | End: 2019-05-31

## 2019-04-15 ENCOUNTER — APPOINTMENT (OUTPATIENT)
Dept: LAB | Facility: CLINIC | Age: 44
End: 2019-04-15
Payer: MEDICARE

## 2019-04-15 ENCOUNTER — HOSPITAL ENCOUNTER (OUTPATIENT)
Dept: MRI IMAGING | Facility: CLINIC | Age: 44
Discharge: HOME | End: 2019-04-15
Attending: PSYCHIATRY & NEUROLOGY | Admitting: PSYCHIATRY & NEUROLOGY
Payer: MEDICARE

## 2019-04-15 ENCOUNTER — OFFICE VISIT (OUTPATIENT)
Dept: NEUROLOGY | Facility: CLINIC | Age: 44
End: 2019-04-15
Payer: MEDICARE

## 2019-04-15 VITALS
DIASTOLIC BLOOD PRESSURE: 86 MMHG | BODY MASS INDEX: 32.62 KG/M2 | SYSTOLIC BLOOD PRESSURE: 125 MMHG | OXYGEN SATURATION: 97 % | HEART RATE: 80 BPM | HEIGHT: 64 IN | WEIGHT: 191.1 LBS | TEMPERATURE: 98.2 F

## 2019-04-15 DIAGNOSIS — G40.909 SEIZURE DISORDER (H): ICD-10-CM

## 2019-04-15 PROBLEM — I47.10 PAROXYSMAL SVT (SUPRAVENTRICULAR TACHYCARDIA) (H): Status: ACTIVE | Noted: 2018-08-23

## 2019-04-15 PROBLEM — F60.3 BORDERLINE PERSONALITY DISORDER IN ADULT (H): Status: ACTIVE | Noted: 2017-05-21

## 2019-04-15 PROBLEM — L70.9 ACNE: Status: ACTIVE | Noted: 2019-04-15

## 2019-04-15 PROBLEM — F43.9 STRESS: Status: ACTIVE | Noted: 2018-08-12

## 2019-04-15 PROBLEM — R79.89 ELEVATED TROPONIN I LEVEL: Status: ACTIVE | Noted: 2017-05-20

## 2019-04-15 PROBLEM — N85.9 DISORDER OF UTERUS: Status: ACTIVE | Noted: 2019-04-15

## 2019-04-15 PROBLEM — R25.9 ABNORMAL INVOLUNTARY MOVEMENT: Status: ACTIVE | Noted: 2018-07-12

## 2019-04-15 PROBLEM — F91.9 CONDUCT DISORDER: Status: ACTIVE | Noted: 2017-05-21

## 2019-04-15 PROBLEM — Z82.49 FAMILY HISTORY OF CORONARY ARTERY DISEASE IN MOTHER: Status: ACTIVE | Noted: 2018-07-12

## 2019-04-15 PROBLEM — R56.9 SEIZURE (H): Status: ACTIVE | Noted: 2018-11-15

## 2019-04-15 PROBLEM — Z78.9 MEDICALLY COMPLEX PATIENT: Status: ACTIVE | Noted: 2018-08-31

## 2019-04-15 PROCEDURE — 25500064 ZZH RX 255 OP 636: Performed by: PSYCHIATRY & NEUROLOGY

## 2019-04-15 PROCEDURE — A9585 GADOBUTROL INJECTION: HCPCS | Performed by: PSYCHIATRY & NEUROLOGY

## 2019-04-15 PROCEDURE — 70553 MRI BRAIN STEM W/O & W/DYE: CPT

## 2019-04-15 RX ORDER — GADOBUTROL 604.72 MG/ML
10 INJECTION INTRAVENOUS ONCE
Status: COMPLETED | OUTPATIENT
Start: 2019-04-15 | End: 2019-04-15

## 2019-04-15 RX ORDER — GABAPENTIN 100 MG/1
200 CAPSULE ORAL 2 TIMES DAILY
Refills: 0 | COMMUNITY
Start: 2019-04-12 | End: 2019-08-13

## 2019-04-15 RX ORDER — LEVETIRACETAM 500 MG/1
1000 TABLET ORAL 2 TIMES DAILY
Qty: 360 TABLET | Refills: 11 | Status: SHIPPED | OUTPATIENT
Start: 2019-04-15 | End: 2020-08-24

## 2019-04-15 RX ADMIN — GADOBUTROL 9 ML: 604.72 INJECTION INTRAVENOUS at 15:39

## 2019-04-15 ASSESSMENT — PAIN SCALES - GENERAL: PAINLEVEL: SEVERE PAIN (6)

## 2019-04-15 ASSESSMENT — MIFFLIN-ST. JEOR: SCORE: 1511.82

## 2019-04-15 NOTE — LETTER
4/15/2019      RE: Iva Valencia  [address]  [facility] [address]       Service Date: 04/15/2019      Theresa Hall NP   [facility]   [address] [place] [address]      RE: Iva Valencia   MRN: [id number]   : 1975      Dear Dr. Hall:      We had the privilege of seeing this 44-year-old woman that we have treated in the past for seizures which appear to be temporal lobe in nature.  She was last seen in our clinic Neurology for seizures on 2018 a year ago and at that time she had an evaluation and EEG has shown some right temporal slowing without interictal transients.  She had been hospitalized in  because of staring spells, unresponsiveness and facial movement and she had a number of days of recording with no seizures present in 5 days, but the EEG show occasional bursts of delta slowing and rare runs of central temporal theta and delta and bitemporal.  The diagnosis of her has never been clear.  There is a lot of psychological issues and she has been maintained on levetiracetam 1000 twice a day, which she has been taking for a while and she says since  and she has not had any episodes of seizures.  She has a large amount of emergency room visits and these are noted to be for intractable headaches, nausea, viral syndrome, migraine.  I do not have a sense that there is an opioid issue here and she is not on it.  She has diagnosis from Psychiatry from this year, February, of PTSD.  She had suicidal ideation and was in the emergency room, anxiety and least twice a month, but she says the seizures have been very well-controlled on this dose of levetiracetam.  She has been on Neurontin, put on recently reported for the headaches which have become quite severe.  She has been seen at [facility].  She has been on a new antidepressant, Trintellix (vortioxetine), by Psychiatry.      She personally reports her life has gone terrible.  She has  her   and apparently she is estranged from her son.  She lives alone.  She is driving and she needs renewal of 's license and she will bring that to next visit.      PHYSICAL EXAMINATION:  Examination today shows a very pleasant woman, very visibly upset.  She has gained some weight.      Her blood pressure is 126/86.  Weight is 191 pounds.  Her affect is improved.  Her cranial nerve exam is normal.  Fundus is good.  There is normal facial sensation.  Neck is supple.  Good coordination in the arms.  Very brisk reflexes, but normal, symmetrical strength, coordination and sensory.      In summary, looks like chronic daily headache syndrome or rebound chronic daily headaches.  She is poised to get Botox injection.  She has had multiple emergency room visits and medication issues.  Looking over her record, she has not had an MRI or any imaging of the brain from  when she had a CT.  The headaches have characteristics and not compatible with aneurysm.  Her seizure control is good.  We will maintain levetiracetam, do renew her 's license at the next visit.  She will require a next visit and MRI results.  Seizures are well controlled.      We will see her in 4 weeks and in the meantime obtain the studies, she how she does with the Botox.      Sincerely,      Jon Boss MD           D: 04/15/2019   T: 04/15/2019   MT: AKA      Name:     GENE REYES   MRN:      [id number]        Account:      [id number]   :      1975           Service Date: 04/15/2019      Document: [id number]

## 2019-04-15 NOTE — LETTER
4/15/2019       RE: Iva Valencia  [address]  [facility] [address]     Dear Colleague,    Thank you for referring your patient, Iva Valencia, to the [facility]. Please see a copy of my visit note below.    No notes on file    Again, thank you for allowing me to participate in the care of your patient.      Sincerely,    Jon Boss MD

## 2019-04-16 LAB — LEVETIRACETAM SERPL-MCNC: 41 UG/ML (ref 12–46)

## 2019-04-19 NOTE — PROGRESS NOTES
Service Date: 04/15/2019      Theresa Hall NP   [facility]   [address] [place] [address]      RE: Iva Valencia   MRN: [id number]   : 1975      Dear Dr. Hall:      We had the privilege of seeing this 44-year-old woman that we have treated in the past for seizures which appear to be temporal lobe in nature.  She was last seen in our clinic Neurology for seizures on 2018 a year ago and at that time she had an evaluation and EEG has shown some right temporal slowing without interictal transients.  She had been hospitalized in  because of staring spells, unresponsiveness and facial movement and she had a number of days of recording with no seizures present in 5 days, but the EEG show occasional bursts of delta slowing and rare runs of central temporal theta and delta and bitemporal.  The diagnosis of her has never been clear.  There is a lot of psychological issues and she has been maintained on levetiracetam 1000 twice a day, which she has been taking for a while and she says since  and she has not had any episodes of seizures.  She has a large amount of emergency room visits and these are noted to be for intractable headaches, nausea, viral syndrome, migraine.  I do not have a sense that there is an opioid issue here and she is not on it.  She has diagnosis from Psychiatry from this year, February, of PTSD.  She had suicidal ideation and was in the emergency room, anxiety and least twice a month, but she says the seizures have been very well-controlled on this dose of levetiracetam.  She has been on Neurontin, put on recently reported for the headaches which have become quite severe.  She has been [facility] at [facility].  She has been on a new antidepressant, Trintellix (vortioxetine), by Psychiatry.      She personally reports her life has gone terrible.  She has  her  and apparently she is estranged from her son.  She lives alone.  She is driving and she needs  renewal of 's license and she will bring that to next visit.      PHYSICAL EXAMINATION:  Examination today shows a very pleasant woman, very visibly upset.  She has gained some weight.      Her blood pressure is 126/86.  Weight is 191 pounds.  Her affect is improved.  Her cranial nerve exam is normal.  Fundus is good.  There is normal facial sensation.  Neck is supple.  Good coordination in the arms.  Very brisk reflexes, but normal, symmetrical strength, coordination and sensory.      In summary, looks like chronic daily headache syndrome or rebound chronic daily headaches.  She is poised to get Botox injection.  She has had multiple emergency room visits and medication issues.  Looking over her record, she has not had an MRI or any imaging of the brain from  when she had a CT.  The headaches have characteristics and not compatible with aneurysm.  Her seizure control is good.  We will maintain levetiracetam, do renew her 's license at the next visit.  She will require a next visit and MRI results.  Seizures are well controlled.      We will see her in 4 weeks and in the meantime obtain the studies, she how she does with the Botox.      Sincerely,      MD AMY Harris MD             D: 04/15/2019   T: 04/15/2019   MT: AKA      Name:     GENE REYES   MRN:      [id number]        Account:      [id number]   :      1975           Service Date: 04/15/2019      Document: [id number]

## 2019-05-31 ENCOUNTER — OFFICE VISIT (OUTPATIENT)
Dept: NEUROLOGY | Facility: CLINIC | Age: 44
End: 2019-05-31
Payer: MEDICARE

## 2019-05-31 ENCOUNTER — APPOINTMENT (OUTPATIENT)
Dept: LAB | Facility: CLINIC | Age: 44
End: 2019-05-31
Payer: MEDICARE

## 2019-05-31 VITALS
SYSTOLIC BLOOD PRESSURE: 137 MMHG | OXYGEN SATURATION: 97 % | WEIGHT: 190 LBS | HEART RATE: 111 BPM | BODY MASS INDEX: 32.61 KG/M2 | DIASTOLIC BLOOD PRESSURE: 84 MMHG

## 2019-05-31 DIAGNOSIS — G40.909 SEIZURE DISORDER (H): Primary | ICD-10-CM

## 2019-05-31 DIAGNOSIS — G40.109 PARTIAL EPILEPSY (H): ICD-10-CM

## 2019-05-31 RX ORDER — OLANZAPINE 5 MG/1
TABLET ORAL
Refills: 0 | COMMUNITY
Start: 2019-05-21 | End: 2019-08-13

## 2019-05-31 RX ORDER — DULOXETIN HYDROCHLORIDE 30 MG/1
CAPSULE, DELAYED RELEASE ORAL
Refills: 0 | COMMUNITY
Start: 2018-06-14 | End: 2019-08-13

## 2019-05-31 RX ORDER — LEVETIRACETAM 250 MG/1
TABLET ORAL
Qty: 240 TABLET | Refills: 11 | Status: SHIPPED | OUTPATIENT
Start: 2019-05-31 | End: 2019-06-03

## 2019-05-31 RX ORDER — AZELASTINE 1 MG/ML
1 SPRAY, METERED NASAL DAILY
Status: ON HOLD | COMMUNITY
Start: 2019-05-29 | End: 2020-02-08

## 2019-05-31 RX ORDER — AMLODIPINE BESYLATE 5 MG/1
5 TABLET ORAL
Status: ON HOLD | COMMUNITY
Start: 2019-02-19 | End: 2020-02-08

## 2019-05-31 RX ORDER — HYDROXYZINE HYDROCHLORIDE 50 MG/1
TABLET, FILM COATED ORAL
Refills: 1 | COMMUNITY
Start: 2019-02-22 | End: 2020-01-30

## 2019-05-31 RX ORDER — CALCIUM CARBONATE/VITAMIN D3 600 MG-10
250 TABLET ORAL
COMMUNITY
Start: 2019-05-30 | End: 2019-08-13

## 2019-05-31 ASSESSMENT — PAIN SCALES - GENERAL: PAINLEVEL: MODERATE PAIN (5)

## 2019-05-31 NOTE — NURSING NOTE
Chief Complaint   Patient presents with     RECHECK     [facility] RETURN SEIZURES       Alanis King, EMT

## 2019-05-31 NOTE — PROGRESS NOTES
Service Date: 2019      Theresa Hall NP   [facility]    [address] [place] [address]      RE: Iva Valencia   MRN: [id number]   : 1975      Dear Ms. Hall:      Once again we had the privilege of seeing Ms. Iva Valencia, a 44-year-old diagnosed with possible complex partial seizures of unknown etiology.  She recently had had an MRI done because of chronic headaches with many emergency room visits and this was with and without contrast MRI of the brain and it does not show any specific abnormalities.  Her last EEG with onset being in  and this was showing right focal slowing and focal dysfunction in the right temporal lobe without interictal transients.      Ms. Valencia reports she has not had a seizure since .  She is requesting a 's form.  She is taking levetiracetam on a dose of 4 tablets of 250 twice a day and last concentration a year ago or less was in the 40s.      She returns today.  She continues to have major psychological issues, was recently involved with the police and notes are made to that regard and should be reviewed.      She reports no alcohol or drug usage.  She has done well with the anticonvulsant.  She feels this made a big difference.  She not had seizure for a number of years.        Denies any suicidal depression today.      PHYSICAL EXAMINATION:  She looks somewhat despondent.  Animates while talking about a grandchild coming, but she still has concerns about the child.  Her blood pressure is 137/84.  Pulse is 111.  Weight is stable at 190.  She shows no signs of toxicity.  She is in a bit of a hurry today because of parking issue and could not be examined.        We filled in her driving forms, gave her a year.      We will renew her medication as well.  We will get an anticonvulsant level today.      We will see her for a future visit.      Sincerely,       MD AMY Harris MD             D: 2019    T: 2019   MT: AKA      Name:     GENE REYES   MRN:      [id number]        Account:      [id number]   :      1975           Service Date: 2019      Document: [id number]

## 2019-05-31 NOTE — LETTER
2019       RE: Iva Valencia  [address]  [facility] [address]     Dear Colleague,    Thank you for referring your patient, Iva Valencia, to the [facility] NEUROLOGY at [facility]. Please see a copy of my visit note below.    Service Date: 2019      Theresa Hall NP   [facility]    [address] [place] [address]      RE: Iva Valencia   MRN: [id number]   : 1975      Dear Ms. Hall:      Once again we had the privilege of seeing Ms. Iva Valencia, a 44-year-old diagnosed with possible complex partial seizures of unknown etiology.  She recently had had an MRI done because of chronic headaches with many emergency room visits and this was with and without contrast MRI of the brain and it does not show any specific abnormalities.  Her last EEG with onset being in  and this was showing right focal slowing and focal dysfunction in the right temporal lobe without interictal transients.      Ms. Valencia reports she has not had a seizure since .  She is requesting a 's form.  She is taking levetiracetam on a dose of 4 tablets of 250 twice a day and last concentration a year ago or less was in the 40s.      She returns today.  She continues to have major psychological issues, was recently involved with the police and notes are made to that regard and should be reviewed.      She reports no alcohol or drug usage.  She has done well with the anticonvulsant.  She feels this made a big difference.  She not had seizure for a number of years.        Denies any suicidal depression today.      PHYSICAL EXAMINATION:  She looks somewhat despondent.  Animates while talking about a grandchild coming, but she still has concerns about the child.  Her blood pressure is 137/84.  Pulse is 111.  Weight is stable at 190.  She shows no signs of toxicity.  She is in a bit of a hurry today because of parking issue and could  not be examined.        We filled in her driving forms, gave her a year.      We will renew her medication as well.  We will get an anticonvulsant level today.      We will see her for a future visit.      Sincerely,       Jon Boss MD      D: 2019   T: 2019   MT: AKA      Name:     GENE REYES   MRN:      [id number]        Account:      [id number]   :      1975           Service Date: 2019      Document: [id number]

## 2019-06-01 LAB — LEVETIRACETAM SERPL-MCNC: 27 UG/ML (ref 12–46)

## 2019-06-03 ENCOUNTER — TELEPHONE (OUTPATIENT)
Dept: NEUROLOGY | Facility: CLINIC | Age: 44
End: 2019-06-03

## 2019-06-03 DIAGNOSIS — G40.109 PARTIAL EPILEPSY (H): ICD-10-CM

## 2019-06-03 RX ORDER — LEVETIRACETAM 250 MG/1
TABLET ORAL
Qty: 744 TABLET | Refills: 3 | Status: SHIPPED | OUTPATIENT
Start: 2019-06-03 | End: 2019-08-13

## 2019-06-03 NOTE — TELEPHONE ENCOUNTER
Received request for 90 day refill conversion.  Patient last seen 5/31/19, and meds filled for the next year.  Script converted to 90 day refills.

## 2019-07-23 ENCOUNTER — TELEPHONE (OUTPATIENT)
Dept: NEUROLOGY | Facility: CLINIC | Age: 44
End: 2019-07-23

## 2019-07-23 NOTE — TELEPHONE ENCOUNTER
Health Call Center    Phone Message    May a detailed message be left on voicemail: yes    Reason for Call: Form or Letter   Type or form/letter needing completion: DMV Letter/form for driving  Provider: Dr Boss  Date form needed: ASAP  Once completed: Fax form to: Fax on the form.       Pt says the DMV never received the form and her license has now been suspended. Please re-fax form and call pt when faxed.     Action Taken: Message routed to:  [facility]

## 2019-07-25 NOTE — TELEPHONE ENCOUNTER
Received message below:  No DMV Form has been received at this clinic since 3-2018.  Placed call to patient left detailed voice mail indicating form has not been received here and that she will likely have to refax it to us also left call back number and name.

## 2019-07-29 NOTE — TELEPHONE ENCOUNTER
"Placed call to patient second time and spoke with her regaring DMV Form.    Patient indicates she had given to \"a lady\" who stated that she faxed it, but DMV did not receive.  However a copy was mailed to her and she took the copy to the DMV herself and is set currently.  This nurse offered to scan her copy into EHR, but she indicates she will just wait until next time.      "

## 2019-08-13 RX ORDER — KETOROLAC TROMETHAMINE 10 MG/1
10 TABLET, FILM COATED ORAL EVERY 6 HOURS PRN
COMMUNITY
End: 2020-01-30

## 2019-08-20 ENCOUNTER — HOSPITAL ENCOUNTER (OUTPATIENT)
Facility: CLINIC | Age: 44
Discharge: HOME | End: 2019-08-20
Attending: OBSTETRICS & GYNECOLOGY | Admitting: OBSTETRICS & GYNECOLOGY
Payer: MEDICARE

## 2019-08-20 ENCOUNTER — ANESTHESIA EVENT (OUTPATIENT)
Dept: SURGERY | Facility: CLINIC | Age: 44
End: 2019-08-20
Payer: MEDICARE

## 2019-08-20 ENCOUNTER — ANESTHESIA (OUTPATIENT)
Dept: SURGERY | Facility: CLINIC | Age: 44
End: 2019-08-20
Payer: MEDICARE

## 2019-08-20 VITALS
WEIGHT: 207.4 LBS | HEIGHT: 64 IN | DIASTOLIC BLOOD PRESSURE: 90 MMHG | RESPIRATION RATE: 16 BRPM | BODY MASS INDEX: 35.41 KG/M2 | SYSTOLIC BLOOD PRESSURE: 147 MMHG | HEART RATE: 74 BPM | OXYGEN SATURATION: 97 % | TEMPERATURE: 98 F

## 2019-08-20 LAB
CREAT SERPL-MCNC: 0.68 MG/DL (ref 0.52–1.04)
GFR SERPL CREATININE-BSD FRML MDRD: >90 ML/MIN/{1.73_M2}
HCG UR QL: NEGATIVE
POTASSIUM SERPL-SCNC: 3.8 MMOL/L (ref 3.4–5.3)

## 2019-08-20 PROCEDURE — 37000009 ZZH ANESTHESIA TECHNICAL FEE, EACH ADDTL 15 MIN: Performed by: OBSTETRICS & GYNECOLOGY

## 2019-08-20 PROCEDURE — 88305 TISSUE EXAM BY PATHOLOGIST: CPT | Performed by: OBSTETRICS & GYNECOLOGY

## 2019-08-20 PROCEDURE — 37000008 ZZH ANESTHESIA TECHNICAL FEE, 1ST 30 MIN: Performed by: OBSTETRICS & GYNECOLOGY

## 2019-08-20 PROCEDURE — 25800030 ZZH RX IP 258 OP 636: Performed by: ANESTHESIOLOGY

## 2019-08-20 PROCEDURE — 25000125 ZZHC RX 250: Performed by: NURSE ANESTHETIST, CERTIFIED REGISTERED

## 2019-08-20 PROCEDURE — 36000056 ZZH SURGERY LEVEL 3 1ST 30 MIN: Performed by: OBSTETRICS & GYNECOLOGY

## 2019-08-20 PROCEDURE — 25000128 H RX IP 250 OP 636: Performed by: ANESTHESIOLOGY

## 2019-08-20 PROCEDURE — 25000132 ZZH RX MED GY IP 250 OP 250 PS 637: Mod: GY | Performed by: ANESTHESIOLOGY

## 2019-08-20 PROCEDURE — 25000128 H RX IP 250 OP 636: Performed by: NURSE ANESTHETIST, CERTIFIED REGISTERED

## 2019-08-20 PROCEDURE — 88305 TISSUE EXAM BY PATHOLOGIST: CPT | Mod: 26 | Performed by: OBSTETRICS & GYNECOLOGY

## 2019-08-20 PROCEDURE — 71000027 ZZH RECOVERY PHASE 2 EACH 15 MINS: Performed by: OBSTETRICS & GYNECOLOGY

## 2019-08-20 PROCEDURE — 25000125 ZZHC RX 250: Performed by: ANESTHESIOLOGY

## 2019-08-20 PROCEDURE — 27210794 ZZH OR GENERAL SUPPLY STERILE: Performed by: OBSTETRICS & GYNECOLOGY

## 2019-08-20 PROCEDURE — 36000058 ZZH SURGERY LEVEL 3 EA 15 ADDTL MIN: Performed by: OBSTETRICS & GYNECOLOGY

## 2019-08-20 PROCEDURE — 84132 ASSAY OF SERUM POTASSIUM: CPT | Performed by: ANESTHESIOLOGY

## 2019-08-20 PROCEDURE — 82565 ASSAY OF CREATININE: CPT | Performed by: ANESTHESIOLOGY

## 2019-08-20 PROCEDURE — 81025 URINE PREGNANCY TEST: CPT | Performed by: OBSTETRICS & GYNECOLOGY

## 2019-08-20 PROCEDURE — 71000013 ZZH RECOVERY PHASE 1 LEVEL 1 EA ADDTL HR: Performed by: OBSTETRICS & GYNECOLOGY

## 2019-08-20 PROCEDURE — 71000012 ZZH RECOVERY PHASE 1 LEVEL 1 FIRST HR: Performed by: OBSTETRICS & GYNECOLOGY

## 2019-08-20 PROCEDURE — 25800025 ZZH RX 258: Performed by: OBSTETRICS & GYNECOLOGY

## 2019-08-20 PROCEDURE — 40000306 ZZH STATISTIC PRE PROC ASSESS II: Performed by: OBSTETRICS & GYNECOLOGY

## 2019-08-20 RX ORDER — CITRIC ACID/SODIUM CITRATE 334-500MG
30 SOLUTION, ORAL ORAL ONCE
Status: COMPLETED | OUTPATIENT
Start: 2019-08-20 | End: 2019-08-20

## 2019-08-20 RX ORDER — MEPERIDINE HYDROCHLORIDE 50 MG/ML
12.5 INJECTION INTRAMUSCULAR; INTRAVENOUS; SUBCUTANEOUS
Status: DISCONTINUED | OUTPATIENT
Start: 2019-08-20 | End: 2019-08-20 | Stop reason: HOSPADM

## 2019-08-20 RX ORDER — LIDOCAINE HYDROCHLORIDE 40 MG/ML
SOLUTION TOPICAL PRN
Status: DISCONTINUED | OUTPATIENT
Start: 2019-08-20 | End: 2019-08-20

## 2019-08-20 RX ORDER — LIDOCAINE 40 MG/G
CREAM TOPICAL
Status: DISCONTINUED | OUTPATIENT
Start: 2019-08-20 | End: 2019-08-20 | Stop reason: HOSPADM

## 2019-08-20 RX ORDER — ONDANSETRON 2 MG/ML
INJECTION INTRAMUSCULAR; INTRAVENOUS PRN
Status: DISCONTINUED | OUTPATIENT
Start: 2019-08-20 | End: 2019-08-20

## 2019-08-20 RX ORDER — PROPOFOL 10 MG/ML
INJECTION, EMULSION INTRAVENOUS CONTINUOUS PRN
Status: DISCONTINUED | OUTPATIENT
Start: 2019-08-20 | End: 2019-08-20

## 2019-08-20 RX ORDER — SCOLOPAMINE TRANSDERMAL SYSTEM 1 MG/1
1 PATCH, EXTENDED RELEASE TRANSDERMAL
Status: DISCONTINUED | OUTPATIENT
Start: 2019-08-20 | End: 2019-08-20 | Stop reason: HOSPADM

## 2019-08-20 RX ORDER — ACETAMINOPHEN 325 MG/1
650 TABLET ORAL
Status: DISCONTINUED | OUTPATIENT
Start: 2019-08-20 | End: 2019-08-20 | Stop reason: HOSPADM

## 2019-08-20 RX ORDER — NALOXONE HYDROCHLORIDE 0.4 MG/ML
.1-.4 INJECTION, SOLUTION INTRAMUSCULAR; INTRAVENOUS; SUBCUTANEOUS
Status: DISCONTINUED | OUTPATIENT
Start: 2019-08-20 | End: 2019-08-20 | Stop reason: HOSPADM

## 2019-08-20 RX ORDER — ONDANSETRON 2 MG/ML
4 INJECTION INTRAMUSCULAR; INTRAVENOUS EVERY 30 MIN PRN
Status: DISCONTINUED | OUTPATIENT
Start: 2019-08-20 | End: 2019-08-20 | Stop reason: HOSPADM

## 2019-08-20 RX ORDER — FENTANYL CITRATE 50 UG/ML
25-50 INJECTION, SOLUTION INTRAMUSCULAR; INTRAVENOUS EVERY 5 MIN PRN
Status: DISCONTINUED | OUTPATIENT
Start: 2019-08-20 | End: 2019-08-20 | Stop reason: HOSPADM

## 2019-08-20 RX ORDER — ONDANSETRON 4 MG/1
4 TABLET, ORALLY DISINTEGRATING ORAL EVERY 30 MIN PRN
Status: DISCONTINUED | OUTPATIENT
Start: 2019-08-20 | End: 2019-08-20 | Stop reason: HOSPADM

## 2019-08-20 RX ORDER — HYDROMORPHONE HYDROCHLORIDE 1 MG/ML
.3-.5 INJECTION, SOLUTION INTRAMUSCULAR; INTRAVENOUS; SUBCUTANEOUS EVERY 10 MIN PRN
Status: DISCONTINUED | OUTPATIENT
Start: 2019-08-20 | End: 2019-08-20 | Stop reason: HOSPADM

## 2019-08-20 RX ORDER — SODIUM CHLORIDE, SODIUM LACTATE, POTASSIUM CHLORIDE, CALCIUM CHLORIDE 600; 310; 30; 20 MG/100ML; MG/100ML; MG/100ML; MG/100ML
INJECTION, SOLUTION INTRAVENOUS CONTINUOUS
Status: DISCONTINUED | OUTPATIENT
Start: 2019-08-20 | End: 2019-08-20 | Stop reason: HOSPADM

## 2019-08-20 RX ORDER — LIDOCAINE HYDROCHLORIDE 10 MG/ML
INJECTION, SOLUTION INFILTRATION; PERINEURAL PRN
Status: DISCONTINUED | OUTPATIENT
Start: 2019-08-20 | End: 2019-08-20

## 2019-08-20 RX ORDER — KETOROLAC TROMETHAMINE 30 MG/ML
INJECTION, SOLUTION INTRAMUSCULAR; INTRAVENOUS PRN
Status: DISCONTINUED | OUTPATIENT
Start: 2019-08-20 | End: 2019-08-20

## 2019-08-20 RX ORDER — ACETAMINOPHEN 325 MG/1
975 TABLET ORAL ONCE
Status: COMPLETED | OUTPATIENT
Start: 2019-08-20 | End: 2019-08-20

## 2019-08-20 RX ORDER — PROPOFOL 10 MG/ML
INJECTION, EMULSION INTRAVENOUS PRN
Status: DISCONTINUED | OUTPATIENT
Start: 2019-08-20 | End: 2019-08-20

## 2019-08-20 RX ORDER — FENTANYL CITRATE 50 UG/ML
INJECTION, SOLUTION INTRAMUSCULAR; INTRAVENOUS PRN
Status: DISCONTINUED | OUTPATIENT
Start: 2019-08-20 | End: 2019-08-20

## 2019-08-20 RX ORDER — DEXAMETHASONE SODIUM PHOSPHATE 4 MG/ML
INJECTION, SOLUTION INTRA-ARTICULAR; INTRALESIONAL; INTRAMUSCULAR; INTRAVENOUS; SOFT TISSUE PRN
Status: DISCONTINUED | OUTPATIENT
Start: 2019-08-20 | End: 2019-08-20

## 2019-08-20 RX ORDER — FENTANYL CITRATE 50 UG/ML
25-50 INJECTION, SOLUTION INTRAMUSCULAR; INTRAVENOUS
Status: DISCONTINUED | OUTPATIENT
Start: 2019-08-20 | End: 2019-08-20 | Stop reason: HOSPADM

## 2019-08-20 RX ADMIN — ONDANSETRON 4 MG: 2 INJECTION INTRAMUSCULAR; INTRAVENOUS at 08:58

## 2019-08-20 RX ADMIN — LIDOCAINE HYDROCHLORIDE 50 MG: 10 INJECTION, SOLUTION INFILTRATION; PERINEURAL at 08:37

## 2019-08-20 RX ADMIN — ONDANSETRON HYDROCHLORIDE 4 MG: 2 INJECTION, SOLUTION INTRAMUSCULAR; INTRAVENOUS at 09:21

## 2019-08-20 RX ADMIN — SCOPALAMINE 1 PATCH: 1 PATCH, EXTENDED RELEASE TRANSDERMAL at 09:36

## 2019-08-20 RX ADMIN — LIDOCAINE HYDROCHLORIDE 4 ML: 40 SOLUTION TOPICAL at 08:37

## 2019-08-20 RX ADMIN — SODIUM CHLORIDE, POTASSIUM CHLORIDE, SODIUM LACTATE AND CALCIUM CHLORIDE: 600; 310; 30; 20 INJECTION, SOLUTION INTRAVENOUS at 07:55

## 2019-08-20 RX ADMIN — PROPOFOL 100 MCG/KG/MIN: 10 INJECTION, EMULSION INTRAVENOUS at 08:40

## 2019-08-20 RX ADMIN — FENTANYL CITRATE 50 MCG: 50 INJECTION, SOLUTION INTRAMUSCULAR; INTRAVENOUS at 09:29

## 2019-08-20 RX ADMIN — PROPOFOL 150 MG: 10 INJECTION, EMULSION INTRAVENOUS at 08:37

## 2019-08-20 RX ADMIN — DEXAMETHASONE SODIUM PHOSPHATE 4 MG: 4 INJECTION, SOLUTION INTRA-ARTICULAR; INTRALESIONAL; INTRAMUSCULAR; INTRAVENOUS; SOFT TISSUE at 08:37

## 2019-08-20 RX ADMIN — FENTANYL CITRATE 50 MCG: 50 INJECTION, SOLUTION INTRAMUSCULAR; INTRAVENOUS at 09:46

## 2019-08-20 RX ADMIN — MIDAZOLAM 2 MG: 1 INJECTION INTRAMUSCULAR; INTRAVENOUS at 08:33

## 2019-08-20 RX ADMIN — SODIUM CHLORIDE, POTASSIUM CHLORIDE, SODIUM LACTATE AND CALCIUM CHLORIDE: 600; 310; 30; 20 INJECTION, SOLUTION INTRAVENOUS at 09:47

## 2019-08-20 RX ADMIN — FENTANYL CITRATE 100 MCG: 50 INJECTION, SOLUTION INTRAMUSCULAR; INTRAVENOUS at 08:37

## 2019-08-20 RX ADMIN — FENTANYL CITRATE 50 MCG: 50 INJECTION, SOLUTION INTRAMUSCULAR; INTRAVENOUS at 09:20

## 2019-08-20 RX ADMIN — FENTANYL CITRATE 50 MCG: 50 INJECTION, SOLUTION INTRAMUSCULAR; INTRAVENOUS at 10:09

## 2019-08-20 RX ADMIN — Medication 100 MG: at 08:37

## 2019-08-20 RX ADMIN — KETOROLAC TROMETHAMINE 15 MG: 30 INJECTION, SOLUTION INTRAMUSCULAR at 08:44

## 2019-08-20 RX ADMIN — ACETAMINOPHEN 975 MG: 325 TABLET, FILM COATED ORAL at 07:46

## 2019-08-20 RX ADMIN — SODIUM CITRATE AND CITRIC ACID MONOHYDRATE 30 ML: 500; 334 SOLUTION ORAL at 10:24

## 2019-08-20 ASSESSMENT — MIFFLIN-ST. JEOR: SCORE: 1585.76

## 2019-08-20 ASSESSMENT — ENCOUNTER SYMPTOMS
SEIZURES: 1
DYSRHYTHMIAS: 1

## 2019-08-20 NOTE — ANESTHESIA PREPROCEDURE EVALUATION
"Anesthesia Pre-Procedure Evaluation    Patient: Iva Valencia   MRN: [id number] : 1975          Preoperative Diagnosis: endometrial polyp, abnormal bleeding    Procedure(s):  hysteroscopy, dilation and curettage, polypectomy with myosure    Past Medical History:   Diagnosis Date     Anemia      Anxiety      Arrhythmia 2018    ablation for \"racing heartbeat\"     Depressive disorder      Fibromyalgia      Gastro-oesophageal reflux disease      Herpes simplex 2006     Hypertension      Hypothyroidism      IBS (irritable bowel syndrome)      Migraine     Common migraine.  Neuro consult--resolved with treatment of seizures     Seizures     last seizure  - petit mal     Past Surgical History:   Procedure Laterality Date     ARTHROSCOPY ANKLE Right 2007     CHOLECYSTECTOMY, LAPOROSCOPIC  2011     CRYOTHERAPY, CERVICAL       D & C       FOOT SURGERY       Heel ulcer treatement       PE TUBES       Retrocalcaneal bursitis and Sarah deformity treatment       TONSILLECTOMY       Anesthesia Evaluation     .             ROS/MED HX    ENT/Pulmonary:  - neg pulmonary ROS     Neurologic:     (+)seizures     Cardiovascular:     (+) hypertension----. : . . . :. dysrhythmias ( h/o SVT) Other, .       METS/Exercise Tolerance:  4 - Raking leaves, gardening   Hematologic: Comments: Hb 12.9        Musculoskeletal:  - neg musculoskeletal ROS       GI/Hepatic:     (+) GERD Symptomatic,       Renal/Genitourinary:     (+) chronic renal disease, type: CRI, Pt does not require dialysis, Pt has no history of transplant, Other Renal/ Genitourinary, renal calculus      Endo:     (+) thyroid problem hypothyroidism, .      Psychiatric:     (+) psychiatric history anxiety, depression and other (comment) (Suicide attempt, conduct disorder)      Infectious Disease:  - neg infectious disease ROS       Malignancy:         Other:                          Physical Exam  Normal systems: cardiovascular, pulmonary and " "dental    Airway   Mallampati: II  TM distance: >3 FB  Neck ROM: full    Dental     Cardiovascular       Pulmonary             Lab Results   Component Value Date    WBC 10.5 02/17/2019    HGB 12.9 02/17/2019    HCT 38.9 02/17/2019     02/17/2019    CRP <3.0 05/31/2008    SED 11 04/12/2010     02/17/2019    POTASSIUM 4.2 02/17/2019    CHLORIDE 107 02/17/2019    CO2 24 02/17/2019    BUN 16 02/17/2019    CR 1.09 (H) 02/17/2019     (H) 02/17/2019    CRYSTAL 9.2 02/17/2019    PHOS 3.6 03/20/2018    ALBUMIN 3.6 03/20/2018    PROTTOTAL 7.6 09/23/2015    ALT 23 09/23/2015    AST 13 09/23/2015    ALKPHOS 99 09/23/2015    BILITOTAL 0.4 09/23/2015    LIPASE 150 09/23/2015    AMYLASE 68 02/20/2011    INR 1.02 08/18/2011    TSH 0.94 02/14/2019    T4 1.13 05/12/2011    T3 107 02/23/2011    HCG Negative 02/13/2019    HCGS Negative 09/23/2015       Preop Vitals  BP Readings from Last 3 Encounters:   05/31/19 137/84   04/15/19 125/86   02/17/19 138/89    Pulse Readings from Last 3 Encounters:   05/31/19 111   04/15/19 80   02/17/19 89      Resp Readings from Last 3 Encounters:   02/17/19 20   02/15/19 16   02/13/19 16    SpO2 Readings from Last 3 Encounters:   05/31/19 97%   04/15/19 97%   02/17/19 97%      Temp Readings from Last 1 Encounters:   04/15/19 98.2  F (36.8  C) (Oral)    Ht Readings from Last 1 Encounters:   04/15/19 1.626 m (5' 4\")      Wt Readings from Last 1 Encounters:   05/31/19 86.2 kg (190 lb)    Estimated body mass index is 32.61 kg/m  as calculated from the following:    Height as of 4/15/19: 1.626 m (5' 4\").    Weight as of 5/31/19: 86.2 kg (190 lb).       Anesthesia Plan      History & Physical Review  History and physical reviewed and following examination; no interval change.    ASA Status:  2 .    NPO Status:  > 8 hours    Plan for General and ETT (uncontrolled gerd) with Intravenous induction. Maintenance will be Inhalation.    PONV prophylaxis:  Ondansetron (or other 5HT-3) and " Dexamethasone or Solumedrol       Postoperative Care  Postoperative pain management:  IV analgesics and Multi-modal analgesia.      Consents  Anesthetic plan, risks, benefits and alternatives discussed with:  Patient..                 Sanchez Dangelo MD                    .

## 2019-08-20 NOTE — OP NOTE
Diagnostic Hysteroscopy with Dilation and Curettage with polypectomy Note:    Preoperative Diagnosis:    1.  Endometrial polyp  2. Abnormal uterine bleeding    Postoperative Diagnosis:  same    Procedure:  Diagnostic Hysteroscopy with Dilation and Curettage with polypectomy      Circulator: Mary Kate Gong RN  Scrub Person: Lissa Jangie  No anesthesia staff entered.    Anesthesia:  LMA     Complications:  None    Specimens:  Endometrial curettings.    Estimated Blood Loss:  10cc       Findings:  A 6 weeks sized anteverted uterus, moderate amounts of endometrial tissue and a uterine sound length of 9 centimeters.  No evidence of uterine perforation.  No endometrial or endocervical polyps.  No submucosal fibroids.  Normal tubal ostia bilaterally.      Indications:  Iva Valencia is a 44 year old female who has a history of irregular, heavy vaginal bleeding and necrotic endometrial polyp on EMB.  Her pregnancy test today is negative.  Iva has consented for surgical evaluation of this problem by diagnostic hysteroscopy with dilation and curettage.  Her consent form is signed and on the chart.      Description of Operative Procedure:    Iva Valencia was taken to the operating room with IV fluids running.  She was placed on the operating table and LMA anesthesia was obtained without difficulty.  Iva was then place in the dorsal lithotomy position with legs in the Charlie stirrups.  She was prepped and draped in the usual sterile fashion.      Attention was then turned to the vagina.  An open sided speculum was placed into the vagina and the anterior lip of the cervix was grasped with a long tenaculum clamp.     The uterus was sounded to 9 centimeters.  The cervix was then sequentially dilated using blunt cervical dilators.  The hysteroscope was then assembled and introduced into the uterine cavity.  A complete survey was mad of the uterine cavity with the findings noted above.  The myosure was used to remove  the polyp and sample all 4 quadrants of the uterine lining. Photographs were taken.  The hysteroscope was then removed from the uterine cavity.  A sharp curette was then introduced into the uterus and used to perform gentle curettage in all four quadrants of the uterine cavity.  A small amount of tissue was obtained.  Minimal bleeding was noted.  All instruments were removed from the vagina and cervix.  Iva tolerated the procedure well.  She was taken to the recovery area in stable condition.  Sponge and instrument counts were correct times three.    Yuliya Mendez

## 2019-08-20 NOTE — ANESTHESIA CARE TRANSFER NOTE
Patient: Iva Valencia    Procedure(s):  hysteroscopy, dilation and curettage, polypectomy with myosure    Diagnosis: endometrial polyp, abnormal bleeding  Diagnosis Additional Information: No value filed.    Anesthesia Type:   General, ETT     Note:  Airway :Face Mask  Patient transferred to:PACU  Handoff Report: Identifed the Patient, Identified the Reponsible Provider, Reviewed the pertinent medical history, Discussed the surgical course, Reviewed Intra-OP anesthesia mangement and issues during anesthesia, Set expectations for post-procedure period and Allowed opportunity for questions and acknowledgement of understanding      Vitals: (Last set prior to Anesthesia Care Transfer)    CRNA VITALS  8/20/2019 0838 - 8/20/2019 0914      8/20/2019             Pulse:  86    SpO2:  100 %    Resp Rate (observed):  20    EKG:  NSR                Electronically Signed By: DEYANIRA Avelar CRNA  August 20, 2019  9:14 AM

## 2019-08-20 NOTE — DISCHARGE INSTRUCTIONS
GENERAL ANESTHESIA OR SEDATION ADULT DISCHARGE INSTRUCTIONS   SPECIAL PRECAUTIONS FOR 24 HOURS AFTER SURGERY    IT IS NOT UNUSUAL TO FEEL LIGHT-HEADED OR FAINT, UP TO 24 HOURS AFTER SURGERY OR WHILE TAKING PAIN MEDICATION.  IF YOU HAVE THESE SYMPTOMS; SIT FOR A FEW MINUTES BEFORE STANDING AND HAVE SOMEONE ASSIST YOU WHEN YOU GET UP TO WALK OR USE THE BATHROOM.    YOU SHOULD REST AND RELAX FOR THE NEXT 24 HOURS AND YOU MUST MAKE ARRANGEMENTS TO HAVE SOMEONE STAY WITH YOU FOR AT LEAST 24 HOURS AFTER YOUR DISCHARGE.  AVOID HAZARDOUS AND STRENUOUS ACTIVITIES.  DO NOT MAKE IMPORTANT DECISIONS FOR 24 HOURS.    DO NOT DRIVE ANY VEHICLE OR OPERATE MECHANICAL EQUIPMENT FOR 24 HOURS FOLLOWING THE END OF YOUR SURGERY.  EVEN THOUGH YOU MAY FEEL NORMAL, YOUR REACTIONS MAY BE AFFECTED BY THE MEDICATION YOU HAVE RECEIVED.    DO NOT DRINK ALCOHOLIC BEVERAGES FOR 24 HOURS FOLLOWING YOUR SURGERY.    DRINK CLEAR LIQUIDS (APPLE JUICE, GINGER ALE, 7-UP, BROTH, ETC.).  PROGRESS TO YOUR REGULAR DIET AS YOU FEEL ABLE.    YOU MAY HAVE A DRY MOUTH, A SORE THROAT, MUSCLES ACHES OR TROUBLE SLEEPING.  THESE SHOULD GO AWAY AFTER 24 HOURS.    CALL YOUR DOCTOR FOR ANY OF THE FOLLOWING:  SIGNS OF INFECTION (FEVER, GROWING TENDERNESS AT THE SURGERY SITE, A LARGE AMOUNT OF DRAINAGE OR BLEEDING, SEVERE PAIN, FOUL-SMELLING DRAINAGE, REDNESS OR SWELLING.    IT HAS BEEN OVER 8 TO 10 HOURS SINCE SURGERY AND YOU ARE STILL NOT ABLE TO URINATE (PASS WATER).       DILATION AND CURETTAGE DISCHARGE INSTRUCTIONS    PLEASE RETURN TO THE CLINIC IN:  ____1 WEEK  ____2 WEEKS  ____4 WEEKS  ____6 WEEKS  MAKE THIS APPOINTMENT AFTER YOU GET HOME IF IT HAS NOT ALREADY BEEN SCHEDULED.    DO NOT DRIVE A CAR, DRINK ALCOHOL OR USE MACHINERY FOR THE NEXT 24 HOURS.  YOU SHOULD WAIT UNTIL YOU HAVE RECOVERED BEFORE MAKING ANY IMPORTANT DECISIONS.    PAIN AND DISCOMFORT  YOU MAY HAVE CRAMPS OR A LOW BACKACHE FOR 24 TO 48 HOURS.  TYLENOL (ACETAMINOPHEN) OR MOTRIN (IBUPROFEN) MAY  HELP, OR YOUR DOCTOR MAY GIVE YOU PAIN MEDICINE.  CALL YOUR DOCTOR IF PAIN CANNOT BE CONTROLLED.  YOU MAY FEEL DROWSY AND WEAK FOR A DAY OR TWO.    VAGINAL DISCHARGE  YOU MAY HAVE SOME BLEEDING OR DISCHARGE FOR UP TO TWO WEEKS.  DO NOT DOUCHE, USE TAMPONS OR HAVE SEX (INTERCOURSE) IN THE FIRST WEEK.  CALL YOUR DOCTOR IF YOU SOAK MORE THAN ONE MAXI PAD (SANITARY NAPKIN) PER HOUR, OR IF YOU PASS LARGE BLOOD CLOTS.    OTHER SYMPTOMS  YOU MAY HAVE A LOW FEVER FOR THE FIRST TWO DAYS.  CALL YOUR DOCTOR IF YOUR FEVER GOES OVER 101 DEGREES FAHRENHEIT.    IF YOU HAVE NAUSEA (FEEL SICK TO YOUR STOMACH), STAY IN BED.  TRY DRINKING A SMALL AMOUNT 7-UP, TEA OR SOUP.    DIET AND ACTIVITY  EAT LIGHT MEALS AND DRINK PLENTY OF FLUIDS FOR THE FIRST 24 HOURS (OR LONGER, IF YOU HAVE NAUSEA).    YOU MAY BATHE, SHOWER AND CLIMB STAIRS.  MOST WOMEN CAN RETURN TO WORK AFTER 24 HOURS.  YOU MAY GO BACK TO YOUR OTHER ACTIVITIES AFTER YOUR PAIN GOES AWAY.          HYSTEROSCOPY DISCHARGE INSTRUCTIONS    PLEASE RETURN TO THE CLINIC IN:  ____1 WEEK  ____2 WEEKS  ____4 WEEKS  ____6 WEEKS  MAKE THIS APPOINTMENT AFTER YOU GET HOME IF IT HAS NOT ALREADY BEEN SCHEDULED.    DO NOT DRIVE A CAR, DRINK ALCOHOL OR USE MACHINERY FOR THE NEXT 24 HOURS.  YOU SHOULD WAIT UNTIL YOU HAVE RECOVERED BEFORE MAKING ANY IMPORTANT DECISIONS.    PAIN  YOU MAY HAVE CRAMPS, SHOULDER PAIN OR A LOW BACKACHE FOR 24 TO 48 HOURS.  TYLENOL (ACETAMINOPHEN) OR MOTRIN (IBUPROFEN) MAY HELP, OR YOUR DOCTOR MAY GIVE YOU PAIN MEDICINE.  CALL YOUR DOCTOR IF PAIN CANNOT BE CONTROLLED.    BLEEDING OR VAGINAL DISCHARGE  YOU MAY HAVE SOME BLEEDING OR DISCHARGE FOR UP TO A WEEK OR LONGER.  DO NOT DOUCHE, USE TAMPONS OR HAVE SEX (INTERCOURSE) FOR ______DAYS.  CALL YOUR DOCTOR IF YOU SOAK MORE THAN ONE MAXI PAD (SANITARY NAPKIN) PER HOUR, OR IF YOU PASS LARGE BLOOD CLOTS.    FEVER  YOU MAY HAVE A LOW FEVER FOR THE FIRST TWO DAYS.  CALL YOUR DOCTOR IF IT GOES OVER 101 DEGREES.    NAUSEA  IF  YOU HAVE NAUSEA (FEEL SICK TO YOUR STOMACH), STAY IN BED.  TRY DRINKING A SMALL AMOUNT OF 7-UP, TEA OR SOUP.    SWOLLEN BELLY  IF YOUR ABDOMEN (BELLY AREA) FEELS FIRM OR SWOLLEN, CALL YOUR DOCTOR.    DIZZINESS AND WEAKNESS  YOU MAY FEEL DIZZY OR WEAK FOR A FEW DAYS.  IF SO, YOU SHOULD REST OFTEN, STAND UP SLOWLY AND USE CARE WHEN CLIMBING STAIRS.    DIET AND ACTIVITY  EAT LIGHT MEALS AND DRINK PLENTY OF FLUIDS FOR THE FIRST 24 HOURS (OR LONGER, IF YOU HAVE NAUSEA).  WAIT 5 DAYS BEFORE BATHING.  SHOWERS ARE OKAY.  MOST WOMEN CAN RETURN TO WORK AFTER 24 HOURS.  YOU MAY GO BACK TO YOUR OTHER ACTIVITIES AFTER YOUR PAIN GOES AWAY.            IF YOU HAVE STITCHES  YOU MAY REMOVE YOUR BANDAGE THE DAY AFTER TREATMENT.  YOUR DOCTOR WILL TELL YOU IF YOUR STITCHES NEED TO BE REMOVED.  SOME STITCHES DISSOLVE OVER TIME.             Maximum acetaminophen (Tylenol) dose from all sources should not exceed 4 grams (4000 mg) per day.  You had 975 mg of tylenol at 7:45 am    You received Toradol, an IV form of ibuprofen (Motrin) at 8:45 am.  Do not take any ibuprofen products until 2:45 pm.      Transderm Scop (Scopolamine) Patch    The Transderm Scop patch placed behind your ear helps to prevent nausea and vomiting associated with the use of anesthesia and certain analgesics used during or after many types surgery.  You will need to remove this patch after 3 days.  However, it may be removed sooner if you are no longer concerned about nausea or vomiting.      The most common side effects:  ?  dryness of the mouth  ?  drowsiness  ?  blurred vision  ?  dilation of pupils  ?  disorientation, memory disturbances and/or confusion  ?  dizziness  ?  restlessness  ?  hallucinations  ?  difficulty urinating  ?  skin rash  ?  red or painful eyes    Avoid drinking alcohol while using Transderm Scop patch.  Be careful about driving or operating any machinery while using this medication since it may make you drowsy.  In the unlikely event that  you experience pain in the eye, which may be accompanied by widening of the pupil, eye redness and blurred vision, remove the Transderm Scop Patch immediately and consult your doctor.    Removal of Transderm Scop Patch:  To remove the patch simply peel it off your skin.  Be sure to wash your hands and the area behind your ear thoroughly with soap and water.  The Transderm Scop Patch will continue to have some active ingredient after use.  Therefore, to avoid accidental contact or ingestion by children or pets, fold the used patch in half with the sticky side together and dispose in the trash out of reach of children and pets.  If you wear contact lens, be sure to wash your hands first before handling contact lens.      Removal date:_____8/23/19_________________.            DR. NAZ BOSWELL D.O.                CLINIC PHONE NUMBER:  [phone]  [facility]

## 2019-08-20 NOTE — ANESTHESIA POSTPROCEDURE EVALUATION
Patient: Iva Valencia    Procedure(s):  hysteroscopy, dilation and curettage, polypectomy with myosure    Diagnosis:endometrial polyp, abnormal bleeding  Diagnosis Additional Information: No value filed.    Anesthesia Type:  General, ETT    Note:  Anesthesia Post Evaluation    Patient location during evaluation: PACU  Patient participation: Able to fully participate in evaluation  Level of consciousness: awake and alert  Pain management: adequate  Airway patency: patent  Cardiovascular status: acceptable  Respiratory status: acceptable  Hydration status: acceptable  PONV: controlled             Last vitals:  Vitals:    08/20/19 0925 08/20/19 0930 08/20/19 0935   BP: (!) 139/96 (!) 147/98 (!) 132/94   Pulse: 82 79 74   Resp: 12 14 22   Temp:      SpO2: 97% 97% 97%         Electronically Signed By: Sanchez Dangelo MD  August 20, 2019  10:43 AM

## 2019-08-21 LAB — COPATH REPORT: NORMAL

## 2019-10-18 ENCOUNTER — APPOINTMENT (OUTPATIENT)
Dept: GENERAL RADIOLOGY | Facility: CLINIC | Age: 44
End: 2019-10-18
Attending: EMERGENCY MEDICINE
Payer: MEDICARE

## 2019-10-18 ENCOUNTER — HOSPITAL ENCOUNTER (EMERGENCY)
Facility: CLINIC | Age: 44
Discharge: HOME | End: 2019-10-19
Attending: EMERGENCY MEDICINE | Admitting: EMERGENCY MEDICINE
Payer: MEDICARE

## 2019-10-18 DIAGNOSIS — R07.9 CHEST PAIN, UNSPECIFIED TYPE: ICD-10-CM

## 2019-10-18 DIAGNOSIS — I10 HYPERTENSION, UNSPECIFIED TYPE: ICD-10-CM

## 2019-10-18 DIAGNOSIS — R00.2 PALPITATIONS: ICD-10-CM

## 2019-10-18 DIAGNOSIS — D72.829 LEUKOCYTOSIS, UNSPECIFIED TYPE: ICD-10-CM

## 2019-10-18 LAB
ANION GAP SERPL CALCULATED.3IONS-SCNC: 7 MMOL/L (ref 3–14)
B-HCG FREE SERPL-ACNC: <5 IU/L
BASOPHILS # BLD AUTO: 0.1 10E9/L (ref 0–0.2)
BASOPHILS NFR BLD AUTO: 0.5 %
BUN SERPL-MCNC: 19 MG/DL (ref 7–30)
CALCIUM SERPL-MCNC: 9 MG/DL (ref 8.5–10.1)
CHLORIDE SERPL-SCNC: 107 MMOL/L (ref 94–109)
CO2 SERPL-SCNC: 24 MMOL/L (ref 20–32)
CREAT SERPL-MCNC: 0.74 MG/DL (ref 0.52–1.04)
D DIMER PPP FEU-MCNC: <0.3 UG/ML FEU (ref 0–0.5)
DIFFERENTIAL METHOD BLD: ABNORMAL
EOSINOPHIL # BLD AUTO: 0 10E9/L (ref 0–0.7)
EOSINOPHIL NFR BLD AUTO: 0.3 %
ERYTHROCYTE [DISTWIDTH] IN BLOOD BY AUTOMATED COUNT: 12.1 % (ref 10–15)
GFR SERPL CREATININE-BSD FRML MDRD: >90 ML/MIN/{1.73_M2}
GLUCOSE SERPL-MCNC: 103 MG/DL (ref 70–99)
HCT VFR BLD AUTO: 47.8 % (ref 35–47)
HGB BLD-MCNC: 15.9 G/DL (ref 11.7–15.7)
IMM GRANULOCYTES # BLD: 0.2 10E9/L (ref 0–0.4)
IMM GRANULOCYTES NFR BLD: 1.3 %
LYMPHOCYTES # BLD AUTO: 2.7 10E9/L (ref 0.8–5.3)
LYMPHOCYTES NFR BLD AUTO: 18.7 %
MAGNESIUM SERPL-MCNC: 2.3 MG/DL (ref 1.6–2.3)
MCH RBC QN AUTO: 29.3 PG (ref 26.5–33)
MCHC RBC AUTO-ENTMCNC: 33.3 G/DL (ref 31.5–36.5)
MCV RBC AUTO: 88 FL (ref 78–100)
MONOCYTES # BLD AUTO: 1.7 10E9/L (ref 0–1.3)
MONOCYTES NFR BLD AUTO: 11.5 %
NEUTROPHILS # BLD AUTO: 9.7 10E9/L (ref 1.6–8.3)
NEUTROPHILS NFR BLD AUTO: 67.7 %
NRBC # BLD AUTO: 0 10*3/UL
NRBC BLD AUTO-RTO: 0 /100
PLATELET # BLD AUTO: 357 10E9/L (ref 150–450)
POTASSIUM SERPL-SCNC: 4 MMOL/L (ref 3.4–5.3)
RBC # BLD AUTO: 5.43 10E12/L (ref 3.8–5.2)
SODIUM SERPL-SCNC: 138 MMOL/L (ref 133–144)
TROPONIN I SERPL-MCNC: <0.015 UG/L (ref 0–0.04)
WBC # BLD AUTO: 14.3 10E9/L (ref 4–11)

## 2019-10-18 PROCEDURE — 80048 BASIC METABOLIC PNL TOTAL CA: CPT | Performed by: EMERGENCY MEDICINE

## 2019-10-18 PROCEDURE — 85025 COMPLETE CBC W/AUTO DIFF WBC: CPT | Performed by: EMERGENCY MEDICINE

## 2019-10-18 PROCEDURE — 71046 X-RAY EXAM CHEST 2 VIEWS: CPT

## 2019-10-18 PROCEDURE — 83735 ASSAY OF MAGNESIUM: CPT | Performed by: EMERGENCY MEDICINE

## 2019-10-18 PROCEDURE — 99285 EMERGENCY DEPT VISIT HI MDM: CPT | Mod: 25

## 2019-10-18 PROCEDURE — 84484 ASSAY OF TROPONIN QUANT: CPT | Performed by: EMERGENCY MEDICINE

## 2019-10-18 PROCEDURE — 93005 ELECTROCARDIOGRAM TRACING: CPT

## 2019-10-18 PROCEDURE — 84702 CHORIONIC GONADOTROPIN TEST: CPT

## 2019-10-18 PROCEDURE — 85379 FIBRIN DEGRADATION QUANT: CPT | Performed by: EMERGENCY MEDICINE

## 2019-10-18 ASSESSMENT — ENCOUNTER SYMPTOMS
PALPITATIONS: 1
ABDOMINAL PAIN: 0
DIZZINESS: 0

## 2019-10-18 ASSESSMENT — MIFFLIN-ST. JEOR: SCORE: 1597.55

## 2019-10-18 NOTE — ED AVS SNAPSHOT
[facility] Emergency Department  [address]  [facility] [address]  Phone:  [phone]  Fax:  [phone]                                    Iva Valencia   MRN: [id number]    Department:  [facility] Emergency Department   Date of Visit:  10/18/2019           After Visit Summary Signature Page    I have received my discharge instructions, and my questions have been answered. I have discussed any challenges I see with this plan with the nurse or doctor.    ..........................................................................................................................................  Patient/Patient Representative Signature      ..........................................................................................................................................  Patient Representative Print Name and Relationship to Patient    ..................................................               ................................................  Date                                   Time    ..........................................................................................................................................  Reviewed by Signature/Title    ...................................................              ..............................................  Date                                               Time          22EPIC Rev 08/18

## 2019-10-19 VITALS
DIASTOLIC BLOOD PRESSURE: 110 MMHG | BODY MASS INDEX: 35.85 KG/M2 | HEIGHT: 64 IN | RESPIRATION RATE: 16 BRPM | WEIGHT: 210 LBS | SYSTOLIC BLOOD PRESSURE: 168 MMHG | HEART RATE: 80 BPM | TEMPERATURE: 97.6 F | OXYGEN SATURATION: 94 %

## 2019-10-19 LAB — TROPONIN I SERPL-MCNC: <0.015 UG/L (ref 0–0.04)

## 2019-10-19 PROCEDURE — 84484 ASSAY OF TROPONIN QUANT: CPT | Performed by: EMERGENCY MEDICINE

## 2019-10-19 NOTE — ED PROVIDER NOTES
History     Chief Complaint:  Chest Pain    The history is provided by the patient.      Iva Valencia is a 44 year old female, with history pertinent for hypertension and seizures on Keppra, who presents with concerns for two hours of palpitations and chest pain. She details sensations of palpitations begun while seated watching TV followed by a sharp chest pain. Currently, she acknowledges persistent pain and intermittent palpitations. She notes she received ablation one year prior due to similar symptoms. She states she is on estrogen and birth control. She denies any leg swelling, dizziness, syncope, abdominal pain, recent travel/surgery, changes in medications, tobacco use, recreational drug use/stimulants use, or personal and family history of clotting disorder. Mom states she had a heart attack at age 60 and notes it was likely secondary to high cholesterol.     Patient's most recent TSH and CBC on 10/11/19:   TSH: 0.97  CBC: WBC 14.1 (H), HGB 15.0,     Allergies:  Droperidol  Buspirone  Ceclor  Compazine  Dihydroergotamine  Haloperidol  Penicillins  Phenergan  Prednisone  Propranolol  Prozac  Reglan  Sumatriptan  Tessalon Perles  Codeine  Cefaclor  Benzonatate  Levothyroxine     Medications:    Norvasc  Atarax  Toradol  Keppra  Synthroid  Protonix  Trintellix     Past Medical History:    Anemia  Anxiety  Arrhythmia  Depression  Fibromyalgia  GERD  Herpes simplex  Hypertension  Hypothyroidism  IBS  Migraine  Overdose of benzodiazepine  Seizures    Past Surgical History:    Ankle arthroscopy  Cholecystectomy  Cervical cryotherapy  D&C  Heel ulcer  PE tubes  Tonsillectomy    Family History:    Sjogren's  Obesity  Depression  Lipids    Social History:  Accompanied by mother.  Former Smoker  Alcohol Use: No  Marital Status:       Review of Systems   Cardiovascular: Positive for chest pain and palpitations. Negative for leg swelling.   Gastrointestinal: Negative for abdominal pain.  "  Neurological: Negative for dizziness and syncope.   All other systems reviewed and are negative.    Physical Exam   Patient Vitals for the past 24 hrs:   BP Temp Heart Rate Resp SpO2 Height Weight   10/18/19 2051 (!) 137/107 97.6  F (36.4  C) 110 16 95 % 1.626 m (5' 4\") 95.3 kg (210 lb)     Physical Exam  General: Adult female sitting upright  Eyes: PERRL, Conjunctive within normal limits  ENT: Moist mucous membranes, oropharynx clear.   CV: Normal S1S2, no murmur, rub or gallop. Regular rate and rhythm  Resp: Clear to auscultation bilaterally, no wheezes, rales or rhonchi. Normal respiratory effort.  GI: Abdomen is soft, nontender and nondistended. No palpable masses. No rebound or guarding.  MSK: No edema. Nontender. Normal active range of motion. No calf asymmetry.  Skin: Warm and dry. No rashes or lesions or ecchymoses on visible skin.  Neuro: Alert and oriented. Responds appropriately to all questions and commands. No focal findings appreciated. Normal muscle tone.  Psych: Normal mood and affect. Pleasant.    Emergency Department Course   ECG:  ECG taken at 2046, ECG read at 2221  Normal sinus rhythm  Normal ECG  Rate 99 bpm. ME interval 130 ms. QRS duration 74 ms. QT/QTc 344/441 ms. P-R-T axes 25 61 37.    Imaging:  Chest XR,  PA & LAT  Negative chest.  Reading per radiology    Laboratory:  CBC: WBC 14.3 (H), HGB 15.9 (H),   BMP: glc 103 (H) o/w WNL (Creatinine 0.74)    D-dimer: <0.3    Troponin (Collected 2240): <0.015  Troponin (Collected 0035): <0.015    ISTAT HCG: <5.0    Magnesium: 2.3    Emergency Department Course:  Nursing notes and vitals reviewed.  2046 EKG obtained in the ED, see results above.   2215 I performed an exam of the patient as documented above.   2317 Per ER-T, patient reports sensation of irregular palpitations that resolved prior to EKG was obtained. No abnormalities were noted on monitors.    Patient reassessed. She is feeling comfortable right now. No further " symptoms.  0107 Findings and plan explained to the patient. Patient discharged home with instructions regarding supportive care, medications, and reasons to return. The importance of close follow-up was reviewed.    Impression & Plan    Medical Decision Making:  This patient presents with chest pain and palpitations.  The work up in the Emergency Department is negative.  The differential diagnosis of chest pain is broad and includes life threatening etiologies such as acute coronary syndrome, myocardial infarction, pulmonary embolism, acute aortic dissection, amongst others.  Other causes may include pneumonia, pneumothorax, chest wall source, pericarditis, pleurisy, esophageal spasm, etc.  No serious etiology for the chest pain were detected today during this visit.  Initial ECG shows normal sinus rhythm.  A broad differential diagnosis for her palpitation was considered including SVT, Atrial fibrillation, ventricular arrhythmia, thyroid disease, acute electrolyte abnormality, anemia, heart disease, PE, etc.  The workup and exam here in ED would indicate that supportive outpatient management is indicated.  I doubt PE as patient has a normal D-dimer and is low-risk.  Doubt acute coronary syndrome given symptoms and exam.  Close follow up with primary care is indicated within 3 days and I have ordered an outpatient stress test as well as event monitor. She understands she should return to the emergency room for any new or worsening of symptoms.     Diagnosis:    ICD-10-CM   1. Palpitations R00.2   2. Chest pain, unspecified type R07.9   3. Leukocytosis, unspecified type D72.829   4. Hypertension, unspecified type I10     Disposition: Home    Scribe Disclosure:  TREVER, Freddie Nunes, am serving as a scribe at 10:12 PM on 10/18/2019 to document services personally performed by Diana Ibarra MD based on my observations and the provider's statements to me.    [facility] EMERGENCY DEPARTMENT        Diana Ibarra MD  10/20/19 3042

## 2019-10-19 NOTE — ED TRIAGE NOTES
Pt presents for concern of palpitations that started at approx 2000 today. Pt states she was lying in bed watching TV at the time they started. States chest heaviness with this sensation. Denies nausea, vomting, or shortness of breath. ABC intact, A&Ox4.

## 2019-10-19 NOTE — DISCHARGE INSTRUCTIONS
Discharge Instructions  Palpitations    Palpitations are an unusual awareness of your heartbeat. People often describe this as the heart skipping, fluttering, racing, irregular, or pounding. At this time, your provider has found no signs that your palpitations are due to a serious or life-threatening condition. However, sometimes there is a serious problem that does not show up right away.    Palpitations can be caused by caffeine, cigarettes, diet pills, energy drinks or supplements, other stimulants, and medications and street drugs. They can also be caused by anxiety, hormone conditions such as high thyroid, and other medical conditions. Sometimes they are a sign of abnormal rhythm in the heart. At this time, your provider did not find any dangerous cause of your symptoms.    Generally, every Emergency Department visit should have a follow-up clinic visit with either a primary or a specialty clinic/provider. Please follow-up as instructed by your emergency provider today.    Return to the Emergency Department if:  You get chest pain or tightness.  You are short of breath.  You get very weak or tired.  You pass out or faint.  Your heart rate is over 120 beats per minute for more than 10 minutes while you are resting.   You have anything else that worries you.    What can I do to help myself?  Fill any prescriptions the provider gave you and take them right away.   Follow your provider s instructions about the prescription medicines you are on. Sometimes the provider may tell you to stop taking a medicine or change the dose.  If you smoke, this may be a good time to quit! The less you can smoke, the better.  Do not use energy drinks, diet pills, or stimulants. Limit your use of caffeine.  If you were given a prescription for medicine here today, be sure to read all of the information (including the package insert) that comes with your prescription.  This will include important information about the medicine, its  side effects, and any warnings that you need to know about.  The pharmacist who fills the prescription can provide more information and answer questions you may have about the medicine.  If you have questions or concerns that the pharmacist cannot address, please call or return to the Emergency Department.     Remember that you can always come back to the Emergency Department if you are not able to see your regular provider in the amount of time listed above, if you get any new symptoms, or if there is anything that worries you.     Discharge Instructions  Chest Pain    You have been seen today for chest pain or discomfort.  At this time, your provider has found no signs that your chest pain is due to a serious or life-threatening condition, (or you have declined more testing and/or admission to the hospital). However, sometimes there is a serious problem that does not show up right away. Your evaluation today may not be complete and you may need further testing and evaluation.     Generally, every Emergency Department visit should have a follow-up clinic visit with either a primary or a specialty clinic/provider. Please follow-up as instructed by your emergency provider today.  Return to the Emergency Department if:  Your chest pain changes, gets worse, starts to happen more often, or comes with less activity.  You are newly short of breath.  You get very weak or tired.  You pass out or faint.  You have any new symptoms, like fever, cough, numb legs, or you cough up blood.  You have anything else that worries you.    Until you follow-up with your regular provider, please do the following:  Take one aspirin daily unless you have an allergy or are told not to by your provider.  If a stress test appointment has been made, go to the appointment.  If you have questions, contact your regular provider.  Follow-up with your regular provider/clinic as directed; this is very important.    If you were given a prescription for  medicine here today, be sure to read all of the information (including the package insert) that comes with your prescription.  This will include important information about the medicine, its side effects, and any warnings that you need to know about.  The pharmacist who fills the prescription can provide more information and answer questions you may have about the medicine.  If you have questions or concerns that the pharmacist cannot address, please call or return to the Emergency Department.       Remember that you can always come back to the Emergency Department if you are not able to see your regular provider in the amount of time listed above, if you get any new symptoms, or if there is anything that worries you.     Discharge Instructions  Hypertension - High Blood Pressure    During you visit to the Emergency Department, your blood pressure was higher than the recommended blood pressure.  This may be related to stress, pain, medication or other temporary conditions. In these cases, your blood pressure may return to normal on its own. If you have a history of high blood pressure, you may need to have your provider adjust your medications. Sometimes, your high measurement here may indicate that you have developed high blood pressure that will stay high unless it is treated. As a general rule, high blood pressure causes problems over years rather than days, weeks, or months. So, while it is important to treat blood pressure, it is rarely important to treat blood pressure immediately. Occasionally we will begin a medication in the Emergency Department; more often we will recommend close follow-up for medications with a primary doctor/clinic.    Generally, every Emergency Department visit should have a follow-up clinic visit with either a primary or a specialty clinic/provider. Please follow-up as instructed by your emergency provider today.    Return to the Emergency Department if you start to have:  A severe  headache.  Chest pain.  Shortness of breath.  Weakness or numbness that affects one part of the body.  Confusion.  Vision changes.  Significant swelling of legs and/or eyes.  A reaction to any medication started in the Emergency Department.    What can I do to help myself?  Avoid alcohol.  Take any blood pressure medicine that you are prescribed.  Get a good night s sleep.  Lower your salt intake.  Exercise.  Lose weight.  Manage stress.  See your doctor regularly    If blood pressure medication was started in the Emergency Department:  The medicine may not have an immediate effect. The body and brain determine what blood pressure you have. The medicine s job is to retrain the body s  thermostat  to a lower blood pressure.  You will need to follow up with your provider to see how this medicine is working for you.  If you were given a prescription for medicine here today, be sure to read all of the information (including the package insert) that comes with your prescription.  This will include important information about the medicine, its side effects, and any warnings that you need to know about.  The pharmacist who fills the prescription can provide more information and answer questions you may have about the medicine.  If you have questions or concerns that the pharmacist cannot address, please call or return to the Emergency Department.   Remember that you can always come back to the Emergency Department if you are not able to see your regular provider in the amount of time listed above, if you get any new symptoms, or if there is anything that worries you.

## 2019-10-21 LAB — INTERPRETATION ECG - MUSE: NORMAL

## 2020-01-13 ENCOUNTER — HOSPITAL ENCOUNTER (EMERGENCY)
Facility: CLINIC | Age: 45
Discharge: HOME | End: 2020-01-13
Attending: EMERGENCY MEDICINE | Admitting: EMERGENCY MEDICINE
Payer: MEDICARE

## 2020-01-13 ENCOUNTER — APPOINTMENT (OUTPATIENT)
Dept: GENERAL RADIOLOGY | Facility: CLINIC | Age: 45
End: 2020-01-13
Attending: EMERGENCY MEDICINE
Payer: MEDICARE

## 2020-01-13 VITALS
RESPIRATION RATE: 16 BRPM | TEMPERATURE: 98.7 F | SYSTOLIC BLOOD PRESSURE: 148 MMHG | DIASTOLIC BLOOD PRESSURE: 89 MMHG | HEART RATE: 72 BPM | OXYGEN SATURATION: 99 %

## 2020-01-13 DIAGNOSIS — R07.89 ATYPICAL CHEST PAIN: ICD-10-CM

## 2020-01-13 LAB
ALBUMIN SERPL-MCNC: 3.4 G/DL (ref 3.4–5)
ALP SERPL-CCNC: 98 U/L (ref 40–150)
ALT SERPL W P-5'-P-CCNC: 30 U/L (ref 0–50)
ANION GAP SERPL CALCULATED.3IONS-SCNC: 5 MMOL/L (ref 3–14)
AST SERPL W P-5'-P-CCNC: 14 U/L (ref 0–45)
B-HCG FREE SERPL-ACNC: <5 IU/L
BASOPHILS # BLD AUTO: 0.1 10E9/L (ref 0–0.2)
BASOPHILS NFR BLD AUTO: 1 %
BILIRUB SERPL-MCNC: 0.3 MG/DL (ref 0.2–1.3)
BUN SERPL-MCNC: 12 MG/DL (ref 7–30)
CALCIUM SERPL-MCNC: 8.9 MG/DL (ref 8.5–10.1)
CHLORIDE SERPL-SCNC: 111 MMOL/L (ref 94–109)
CO2 SERPL-SCNC: 26 MMOL/L (ref 20–32)
CREAT SERPL-MCNC: 0.81 MG/DL (ref 0.52–1.04)
DIFFERENTIAL METHOD BLD: NORMAL
EOSINOPHIL # BLD AUTO: 0.1 10E9/L (ref 0–0.7)
EOSINOPHIL NFR BLD AUTO: 1.1 %
ERYTHROCYTE [DISTWIDTH] IN BLOOD BY AUTOMATED COUNT: 13.2 % (ref 10–15)
GFR SERPL CREATININE-BSD FRML MDRD: 89 ML/MIN/{1.73_M2}
GLUCOSE SERPL-MCNC: 102 MG/DL (ref 70–99)
HCT VFR BLD AUTO: 40.5 % (ref 35–47)
HGB BLD-MCNC: 13.5 G/DL (ref 11.7–15.7)
IMM GRANULOCYTES # BLD: 0 10E9/L (ref 0–0.4)
IMM GRANULOCYTES NFR BLD: 0.5 %
INTERPRETATION ECG - MUSE: NORMAL
INTERPRETATION ECG - MUSE: NORMAL
LYMPHOCYTES # BLD AUTO: 2.1 10E9/L (ref 0.8–5.3)
LYMPHOCYTES NFR BLD AUTO: 26.1 %
MCH RBC QN AUTO: 30.5 PG (ref 26.5–33)
MCHC RBC AUTO-ENTMCNC: 33.3 G/DL (ref 31.5–36.5)
MCV RBC AUTO: 91 FL (ref 78–100)
MONOCYTES # BLD AUTO: 1 10E9/L (ref 0–1.3)
MONOCYTES NFR BLD AUTO: 12.9 %
NEUTROPHILS # BLD AUTO: 4.7 10E9/L (ref 1.6–8.3)
NEUTROPHILS NFR BLD AUTO: 58.4 %
NRBC # BLD AUTO: 0 10*3/UL
NRBC BLD AUTO-RTO: 0 /100
PLATELET # BLD AUTO: 287 10E9/L (ref 150–450)
POTASSIUM SERPL-SCNC: 3.3 MMOL/L (ref 3.4–5.3)
PROT SERPL-MCNC: 6.8 G/DL (ref 6.8–8.8)
RBC # BLD AUTO: 4.43 10E12/L (ref 3.8–5.2)
SODIUM SERPL-SCNC: 142 MMOL/L (ref 133–144)
TROPONIN I SERPL-MCNC: <0.015 UG/L (ref 0–0.04)
TROPONIN I SERPL-MCNC: <0.015 UG/L (ref 0–0.04)
WBC # BLD AUTO: 8 10E9/L (ref 4–11)

## 2020-01-13 PROCEDURE — 93005 ELECTROCARDIOGRAM TRACING: CPT | Mod: 76

## 2020-01-13 PROCEDURE — 80053 COMPREHEN METABOLIC PANEL: CPT | Performed by: EMERGENCY MEDICINE

## 2020-01-13 PROCEDURE — 99285 EMERGENCY DEPT VISIT HI MDM: CPT | Mod: 25

## 2020-01-13 PROCEDURE — 93005 ELECTROCARDIOGRAM TRACING: CPT

## 2020-01-13 PROCEDURE — 85025 COMPLETE CBC W/AUTO DIFF WBC: CPT | Performed by: EMERGENCY MEDICINE

## 2020-01-13 PROCEDURE — 84702 CHORIONIC GONADOTROPIN TEST: CPT

## 2020-01-13 PROCEDURE — 96360 HYDRATION IV INFUSION INIT: CPT

## 2020-01-13 PROCEDURE — 25000132 ZZH RX MED GY IP 250 OP 250 PS 637: Mod: GY | Performed by: EMERGENCY MEDICINE

## 2020-01-13 PROCEDURE — 71046 X-RAY EXAM CHEST 2 VIEWS: CPT

## 2020-01-13 PROCEDURE — 25800030 ZZH RX IP 258 OP 636: Performed by: EMERGENCY MEDICINE

## 2020-01-13 PROCEDURE — 84484 ASSAY OF TROPONIN QUANT: CPT | Performed by: EMERGENCY MEDICINE

## 2020-01-13 RX ORDER — ASPIRIN 81 MG/1
324 TABLET, CHEWABLE ORAL ONCE
Status: COMPLETED | OUTPATIENT
Start: 2020-01-13 | End: 2020-01-13

## 2020-01-13 RX ORDER — SODIUM CHLORIDE 9 MG/ML
1000 INJECTION, SOLUTION INTRAVENOUS CONTINUOUS
Status: DISCONTINUED | OUTPATIENT
Start: 2020-01-13 | End: 2020-01-13 | Stop reason: HOSPADM

## 2020-01-13 RX ORDER — KETOROLAC TROMETHAMINE 15 MG/ML
10 INJECTION, SOLUTION INTRAMUSCULAR; INTRAVENOUS ONCE
Status: DISCONTINUED | OUTPATIENT
Start: 2020-01-13 | End: 2020-01-13 | Stop reason: HOSPADM

## 2020-01-13 RX ADMIN — ASPIRIN 81 MG 324 MG: 81 TABLET ORAL at 02:09

## 2020-01-13 RX ADMIN — SODIUM CHLORIDE 1000 ML: 9 INJECTION, SOLUTION INTRAVENOUS at 02:11

## 2020-01-13 ASSESSMENT — ENCOUNTER SYMPTOMS: COUGH: 1

## 2020-01-13 NOTE — ED NOTES
Lab staff reports issues with results crossing over to Epic.  Per lab staff member, Troponin is less than 0.015 for redraw.

## 2020-01-13 NOTE — ED PROVIDER NOTES
History     Chief Complaint:    Cough      HPI   Iva Bowman is a 45 year old female who presents with chest pain. Patient states that she started having chest pain about 45 minutes ago. The pain wraps around to the back of her left shoulder and is constant. She describes the pain as 5/10. She also notes some coughing and phlegm with coughing. She states she has had the cough for the past month. Additionally, she has had chest pain before, but that pain has been around her sternum. She denies taking birth control. She denies a history of blood clots.     Allergies:  Droperidol  Buspirone  Ceclor  Compazine  Dihydroergotamine  Haloperidol  Penicillins  Phenergan  Prednisone  Propranolol  Prozac  Reglan  Sumatriptan  Tessalon Perles  Benzonatate  Cefaclor  Codeine  Levothyroxine  Aripiprazole  Cefaclor  Prochlorperazine  Droperidol  Gabapentin  Loratadine-Pseudoephedrine  Metoclopramide  Fluoxetine  Metoclopramide  Risperidone  Benzonatate  Olanzapine  Promethazine  Rizatriptan    Medications:    Prilosec  Zoloft  Ultram  Ativan  Toradol  Zyrtec  Protonix  Synthroid  Lisinopril  Albuterol  Keppra  Norvasc  Atarax  Trintellix  Zantac    Past Medical History:    Atypical endometrial hyperplasia  Borderline personality disorder in adult  Pulmonary nodule, left  Nephrolithiasis  Pre-diabetes  Tobacco abuse  GERD  Depression  Generalized anxiety disorder  Genital herpes  Hypothyroidism  Drug overdose  IBS  Partial epilepsy  PTSD  Conduct disorder  Gottron's papules  Fibromyalgia    Past Surgical History:    Cholecystectomy  Tonsillectomy  Ankle surgery  Ablation of dysrthymic focus, left  Polypectomy  Myringotomy    Family History:    Mother: Sjogren's, Obesity, Depression, hyperlipidemia, Arthritis, CAD, Heart disease, Migraines  Father: Lipids  Son: Migraines, Bipolar disorder    Social History:  Former smoker, Quit: 8/5/1998  Rare alcohol use  Denies drug use  Marital Status:   [4]    Review of Systems    Respiratory: Positive for cough.    Cardiovascular: Positive for chest pain.   All other systems reviewed and are negative.        Physical Exam   First Vitals:  BP: (!) 157/99  Pulse: 69  Heart Rate: 58  Temp: 98.7  F (37.1  C)  Resp: 20  SpO2: 99 %    Physical Exam  Constitutional:  Oriented to person, place, and time.   HENT:   Head:    Normocephalic.   Mouth/Throat:   Oropharynx is clear and moist.   Eyes:    EOM are normal. Pupils are equal, round, and reactive to light.   Neck:    Neck supple.   Cardiovascular:  Normal rate, regular rhythm and normal heart sounds.      Exam reveals no gallop and no friction rub.       No murmur heard.  Pulmonary/Chest:  Effort normal and breath sounds normal.      No respiratory distress. No wheezes. No rales.      No reproducible chest wall pain.  Abdominal:   Soft. No distension. No tenderness. No rebound and no guarding.   Musculoskeletal:  2+ distal equal pulses.  No leg calf tenderness, swelling or edema.  Neurological:   Alert and oriented to person, place, and time.           Moves all 4 extremities spontaneously    Skin:    No rash noted. No pallor.       Emergency Department Course   ECG:  Indication: Chest Pain  Time: 0218  Vent. Rate 72 bpm. SC interval 150. QRS duration 86. QT/QTc 418/457. P-R-T axis 13 31 30.  Normal sinus rhythm with sinus arrhythmia. Read time: 0225    Indication: Chest Pain  Time: 0336  Vent. Rate 73 bpm. SC interval 138. QRS duration 86. QT/QTc 414/456. P-R-T axis 14 47 36.  Normal sinus rhythm Normal ECG. Read time: 0336    Imaging:  XR Chest 2 views:   IMPRESSION: No evidence of active cardiopulmonary disease.  as per radiology.    Laboratory:  0247 Troponin: <0.015    0409 Troponin: <0.015    CBC: WBC: 8.0, HGB: 13.5, PLT: 287    CMP: Glucose 102(H), Chloride: 111(H), Potassium: 3.3(L), o/w WNL (Creatinine: 0.81)    ISTAT HCG Quant: <5.0    Interventions:  0209 Aspirin 324 mg Oral  0211 NS 1000 mL IV    Emergency Department  Course:  Nursing notes and vitals reviewed. (0427) I performed an exam of the patient as documented above.     IV inserted. Medicine administered as documented above. Blood drawn. This was sent to the lab for further testing, results above.     The patient was sent for a XR Chest 2 views while in the emergency department, findings above.     0457 I rechecked the patient and discussed the results of her workup thus far.     Findings and plan explained to the Patient. Patient discharged home with instructions regarding supportive care, medications, and reasons to return. The importance of close follow-up was reviewed.     I personally reviewed the laboratory results with the Patient and answered all related questions prior to discharge.     Impression & Plan      Medical Decision Making:  Iva Bowman is a 45 year old female who presents with chest pain.  The work up in the Emergency Department is negative.  The differential diagnosis of chest pain is broad and includes life threatening etiologies such as acute coronary syndrome, myocardial infarction, pulmonary embolism, acute aortic dissection, amongst others.  The patient's EKG was nonischemic and troponin was normal.  The chest pain symptom complex would be atypical for coronary ischemia.  The patient is low risk for PE and has a negative D-dimer and so I feel the risks of CT imaging outweighs any benefits.  Chest xray reveals no evidence of pneumonia or pneumothorax.  No serious etiology for the chest pain were detected today during this visit.There is a low heart score, so the pain is unlikely to be cardiac.  perc is negative, so I doubt pulmonary embolism.  I suspect this pain is likely due to costochondritis.  Close follow up with primary care is indicated should the pain continue, as further work up may be performed; this was made clear to the patient, who understands.     Diagnosis:    ICD-10-CM    1. Atypical chest pain R07.89         Disposition:  discharged to home     Discharge Medications:  New Prescriptions    No medications on file     Scribe Disclosure:  I, Scott Baeza, am serving as a scribe on 1/13/2020 at 4:27 AM to personally document services performed by Bharathi Bardales MD based on my observations and the provider's statements to me.     Scott Baeza  1/13/2020   [facility] EMERGENCY DEPARTMENT       Bharathi Bardales MD  01/13/20 0556

## 2020-01-13 NOTE — ED AVS SNAPSHOT
[facility] Emergency Department  [address]  [facility] [address]  Phone:  [phone]  Fax:  [phone]                                    Iva Bowman   MRN: [id number]    Department:  [facility] Emergency Department   Date of Visit:  1/13/2020           After Visit Summary Signature Page    I have received my discharge instructions, and my questions have been answered. I have discussed any challenges I see with this plan with the nurse or doctor.    ..........................................................................................................................................  Patient/Patient Representative Signature      ..........................................................................................................................................  Patient Representative Print Name and Relationship to Patient    ..................................................               ................................................  Date                                   Time    ..........................................................................................................................................  Reviewed by Signature/Title    ...................................................              ..............................................  Date                                               Time          22EPIC Rev 08/18

## 2020-01-13 NOTE — ED NOTES
Bed: ED14  Expected date: 1/13/20  Expected time: 1:26 AM  Means of arrival: Ambulance  Comments:  [id number] 45 URI x2 weeks

## 2020-01-13 NOTE — ED TRIAGE NOTES
C/o URI sx for past month. Pt seen at  multiple times and has completed a round of doxycyline and zpak.  Pt now c/o CP 45 minutes PTA.  ABC in tact. A/OX4

## 2020-01-20 ENCOUNTER — HOSPITAL ENCOUNTER (EMERGENCY)
Facility: CLINIC | Age: 45
Discharge: HOME | End: 2020-01-20
Attending: PHYSICIAN ASSISTANT | Admitting: PHYSICIAN ASSISTANT
Payer: MEDICARE

## 2020-01-20 VITALS
RESPIRATION RATE: 18 BRPM | HEART RATE: 88 BPM | TEMPERATURE: 98.1 F | DIASTOLIC BLOOD PRESSURE: 108 MMHG | SYSTOLIC BLOOD PRESSURE: 142 MMHG | OXYGEN SATURATION: 99 %

## 2020-01-20 DIAGNOSIS — R51.9 HEADACHE: ICD-10-CM

## 2020-01-20 DIAGNOSIS — R51.9 NONINTRACTABLE HEADACHE, UNSPECIFIED CHRONICITY PATTERN, UNSPECIFIED HEADACHE TYPE: ICD-10-CM

## 2020-01-20 PROCEDURE — 96375 TX/PRO/DX INJ NEW DRUG ADDON: CPT

## 2020-01-20 PROCEDURE — 99284 EMERGENCY DEPT VISIT MOD MDM: CPT | Mod: 25

## 2020-01-20 PROCEDURE — 96372 THER/PROPH/DIAG INJ SC/IM: CPT | Mod: 59

## 2020-01-20 PROCEDURE — 25000128 H RX IP 250 OP 636: Performed by: PHYSICIAN ASSISTANT

## 2020-01-20 PROCEDURE — 96374 THER/PROPH/DIAG INJ IV PUSH: CPT

## 2020-01-20 PROCEDURE — 25000132 ZZH RX MED GY IP 250 OP 250 PS 637: Mod: GY | Performed by: PHYSICIAN ASSISTANT

## 2020-01-20 PROCEDURE — 96361 HYDRATE IV INFUSION ADD-ON: CPT

## 2020-01-20 PROCEDURE — 25800030 ZZH RX IP 258 OP 636: Performed by: PHYSICIAN ASSISTANT

## 2020-01-20 RX ORDER — KETOROLAC TROMETHAMINE 30 MG/ML
30 INJECTION, SOLUTION INTRAMUSCULAR; INTRAVENOUS ONCE
Status: COMPLETED | OUTPATIENT
Start: 2020-01-20 | End: 2020-01-20

## 2020-01-20 RX ORDER — DIPHENHYDRAMINE HYDROCHLORIDE 50 MG/ML
25 INJECTION INTRAMUSCULAR; INTRAVENOUS ONCE
Status: COMPLETED | OUTPATIENT
Start: 2020-01-20 | End: 2020-01-20

## 2020-01-20 RX ORDER — OLANZAPINE 10 MG/2ML
5 INJECTION, POWDER, FOR SOLUTION INTRAMUSCULAR ONCE
Status: COMPLETED | OUTPATIENT
Start: 2020-01-20 | End: 2020-01-20

## 2020-01-20 RX ORDER — ACETAMINOPHEN 500 MG
1000 TABLET ORAL ONCE
Status: COMPLETED | OUTPATIENT
Start: 2020-01-20 | End: 2020-01-20

## 2020-01-20 RX ORDER — ONDANSETRON 2 MG/ML
4 INJECTION INTRAMUSCULAR; INTRAVENOUS ONCE
Status: COMPLETED | OUTPATIENT
Start: 2020-01-20 | End: 2020-01-20

## 2020-01-20 RX ADMIN — DIPHENHYDRAMINE HYDROCHLORIDE 25 MG: 50 INJECTION, SOLUTION INTRAMUSCULAR; INTRAVENOUS at 17:10

## 2020-01-20 RX ADMIN — ONDANSETRON HYDROCHLORIDE 4 MG: 2 INJECTION, SOLUTION INTRAMUSCULAR; INTRAVENOUS at 18:41

## 2020-01-20 RX ADMIN — KETOROLAC TROMETHAMINE 30 MG: 30 INJECTION, SOLUTION INTRAMUSCULAR at 17:10

## 2020-01-20 RX ADMIN — SODIUM CHLORIDE 1000 ML: 9 INJECTION, SOLUTION INTRAVENOUS at 17:10

## 2020-01-20 RX ADMIN — OLANZAPINE 5 MG: 10 INJECTION, POWDER, FOR SOLUTION INTRAMUSCULAR at 17:13

## 2020-01-20 RX ADMIN — ACETAMINOPHEN 1000 MG: 500 TABLET, FILM COATED ORAL at 18:42

## 2020-01-20 ASSESSMENT — ENCOUNTER SYMPTOMS
VOMITING: 1
HEADACHES: 1
ABDOMINAL PAIN: 0
NAUSEA: 1

## 2020-01-20 NOTE — ED PROVIDER NOTES
History   Chief Complaint:  Headache     HPI   Iva Bowman is a 45 year old female with a complex medical history notable for migraines who presents with headache. The patient reports that earlier today she developed a gradually-worsening headache and hyperacusis. She also endorses one episode of vomiting. The denies abdominal pain and tinnitus. She does have a history of migraines in the past and states her current headache feels the same though it is more severe. The patient has had Botox injections to treat her migraines and her most recent injection was two months ago.     Allergies:  Droperidol  Buspirone  Ceclor [Cephalosporins]  Compazine  Dihydroergotamine  Haloperidol  Penicillins  Phenergan [Promethazine Hcl]  Prednisone  Propranolol  Prozac [Fluoxetine]  Reglan [Metoclopramide]  Sumatriptan  Tessalon Perles [Benzonatate]  Benzonatate  Cefaclor  Codeine  Levothyroxine     Medications:   Amlodipine  Aspirin  Hydroxyzine  Levothyroxine   Keppra  Protonix   Zantac  Miralax  Trintellix    Past Medical History:     Arrhythmia  Borderline personality disorder in adult    Depressive disorder   Fibromyalgia   GERD   Herpes simplex   Hypertension   Hypothyroidism  IBS  Migraine   Seizures  Acne  Disorder of uterus  Paroxysmal SVT  Conduct disorder  Memory problem  Suicidal behavior without attempted self-injury  Suicidal ideation  Suicide and self-inflicted injury  Calculus of kidney  Pre-diabetes  Petit-mal seizures  Mixed stress and urge urinary incontinence  Generalized anxiety disorder  Overdose of benzodiazepine  Drug overdose, multiple drugs  Iron deficiency anemia  Seasonal allergic rhinitis  Undifferentiated somatoform disorder  PTSD  Partial epilepsy  Gottron's papules  Moderate major depression   Achilles bursitis or tendinitis  Anxiety state  Genital herpes  Disorder of skin or subcutaneous tissue  External hemorrhoids    Past Surgical History:    Laparoscopic cholecystectomy  Cryotherapy,  cervical  D&C  D&C, operative hysteroscopy with morcellator, combined  Foot surgery  Heel ulcer treatment  PE tubes  Retrocalcaneal bursitis and Sarah deformity treatment  Tonsillectomy     Family History:    Sjogren's disorder  Obesity  Depression  Hyperlipidemia  Bipolar disorder    Social History:  Smoking status: Former smoker  Alcohol use: No    Review of Systems   HENT: Negative for tinnitus.    Gastrointestinal: Positive for nausea and vomiting. Negative for abdominal pain.   Neurological: Positive for headaches.   All other systems reviewed and are negative.    Physical Exam     Patient Vitals for the past 24 hrs:   BP Temp Pulse Resp SpO2   01/20/20 1557 (!) 142/108 98.1  F (36.7  C) 88 18 99 %     Physical Exam  Constitutional: uncomfortable appearing, but non-toxic.   Head: No external signs of trauma noted to head or face.   Eyes: Pupils are equal, round, and reactive to light. Conjunctiva normal. EOMI.   ENT: MMM. Oropharynx clear and moist. Normal voice.   Neck: non-tender. Normal ROM. No nuchal rigidity.   Cardiovascular: Normal rate, regular rhythm, and intact distal pulses.    Respiratory: Effort normal. No respiratory distress. Lungs clear to auscultation bilaterally.   GI: Soft. Non-tender.   Musculoskeletal: No deformities appreciated. Normal ROM. No edema noted.  Neurological: Alert and Oriented x 3. Speech normal. Moves all extremities equally. CN II-XII intact. Coordination normal. Normal strength and sensation in upper and lower extremities bilaterally.   Psychiatric: Appropriate mood, affect, and behavior.   Skin: Skin is warm and dry.       Emergency Department Course   Interventions:  1710 NS 1L IV Bolus   Benadryl 25 mg IV   Toradol 30 mg IV  1713 Zyprexa 5 mg IM     Emergency Department Course:  Past medical records, nursing notes, and vitals reviewed.   1633 I performed an exam of the patient and obtained history, as documented above.    IV inserted.    1802 I rechecked the patient.  Patient is sitting up in bed, feeling improved. Explained findings to the patient.     Findings and plan explained to the patient. Patient discharged home with instructions regarding supportive care, medications, and reasons to return. The importance of close follow-up was reviewed.     Impression & Plan    Medical Decision Making:  Iva Bowman is a 45 year old female who presents with a headache. She has a history of migraines and her current headache feels similar. She has multiple ED presentations for the same per care everywhere and is followed by neurology. I considered a broad differential diagnosis for this patient including tension, migraine, analgesic rebound, occipital neuralgia, etc. I also considered other less common but serious causes considered included meningitis, encephalitis, subarachnoid bleed, temporal arteritis, stroke, tumor, etc. She is afebrile and has a normal neurologic exam today. She has no signs of serious headache etiologies at this point.  No advanced imaging is indicated, nor is CT/lumbar puncture for SAH. Her questions were answered and she feels improved after above interventions in ED.  Supportive outpatient management is therefore indicated.  Headache precautions given for home.      Diagnosis:    ICD-10-CM   1. Headache R51        Disposition:  Discharged to home.        1/20/2020   Anthony Hernandez, am serving as a scribe at 4:48 PM on 1/20/2020 to document services personally performed by Fang Dorman PA-C based on my observations and the provider's statements to me.      Fang Dorman PA-C  01/20/20 1938

## 2020-01-20 NOTE — ED AVS SNAPSHOT
[facility] Emergency Department  [address]  [facility] [address]  Phone:  [phone]  Fax:  [phone]                                    Iva Bowman   MRN: [id number]    Department:  [facility] Emergency Department   Date of Visit:  1/20/2020           After Visit Summary Signature Page    I have received my discharge instructions, and my questions have been answered. I have discussed any challenges I see with this plan with the nurse or doctor.    ..........................................................................................................................................  Patient/Patient Representative Signature      ..........................................................................................................................................  Patient Representative Print Name and Relationship to Patient    ..................................................               ................................................  Date                                   Time    ..........................................................................................................................................  Reviewed by Signature/Title    ...................................................              ..............................................  Date                                               Time          22EPIC Rev 08/18

## 2020-01-23 ENCOUNTER — APPOINTMENT (OUTPATIENT)
Dept: GENERAL RADIOLOGY | Facility: CLINIC | Age: 45
End: 2020-01-23
Attending: EMERGENCY MEDICINE
Payer: MEDICARE

## 2020-01-23 ENCOUNTER — HOSPITAL ENCOUNTER (EMERGENCY)
Facility: CLINIC | Age: 45
Discharge: HOME | End: 2020-01-24
Attending: EMERGENCY MEDICINE | Admitting: EMERGENCY MEDICINE
Payer: MEDICARE

## 2020-01-23 VITALS
RESPIRATION RATE: 18 BRPM | OXYGEN SATURATION: 97 % | DIASTOLIC BLOOD PRESSURE: 98 MMHG | WEIGHT: 160 LBS | BODY MASS INDEX: 27.46 KG/M2 | TEMPERATURE: 97.3 F | HEART RATE: 94 BPM | SYSTOLIC BLOOD PRESSURE: 153 MMHG

## 2020-01-23 DIAGNOSIS — S76.112A STRAIN OF LEFT QUADRICEPS MUSCLE, INITIAL ENCOUNTER: ICD-10-CM

## 2020-01-23 DIAGNOSIS — S93.492A SPRAIN OF OTHER LIGAMENT OF LEFT ANKLE, INITIAL ENCOUNTER: ICD-10-CM

## 2020-01-23 PROCEDURE — 25000132 ZZH RX MED GY IP 250 OP 250 PS 637: Mod: GY | Performed by: EMERGENCY MEDICINE

## 2020-01-23 PROCEDURE — 99284 EMERGENCY DEPT VISIT MOD MDM: CPT | Mod: 25

## 2020-01-23 PROCEDURE — 73610 X-RAY EXAM OF ANKLE: CPT | Mod: LT

## 2020-01-23 PROCEDURE — 29515 APPLICATION SHORT LEG SPLINT: CPT | Mod: LT

## 2020-01-23 RX ORDER — IBUPROFEN 600 MG/1
600 TABLET, FILM COATED ORAL ONCE
Status: COMPLETED | OUTPATIENT
Start: 2020-01-23 | End: 2020-01-23

## 2020-01-23 RX ADMIN — IBUPROFEN 600 MG: 600 TABLET, FILM COATED ORAL at 23:37

## 2020-01-23 ASSESSMENT — ENCOUNTER SYMPTOMS
MYALGIAS: 1
ARTHRALGIAS: 1

## 2020-01-23 NOTE — ED AVS SNAPSHOT
[facility] Emergency Department  [address]  [facility] [address]  Phone:  [phone]  Fax:  [phone]                                    Iva Bowman   MRN: [id number]    Department:  [facility] Emergency Department   Date of Visit:  1/23/2020           After Visit Summary Signature Page    I have received my discharge instructions, and my questions have been answered. I have discussed any challenges I see with this plan with the nurse or doctor.    ..........................................................................................................................................  Patient/Patient Representative Signature      ..........................................................................................................................................  Patient Representative Print Name and Relationship to Patient    ..................................................               ................................................  Date                                   Time    ..........................................................................................................................................  Reviewed by Signature/Title    ...................................................              ..............................................  Date                                               Time          22EPIC Rev 08/18

## 2020-01-24 NOTE — ED TRIAGE NOTES
Salting drive way when slipped on the ice. Didn't fall all the way to the ground. Hurt L ankle and feels like pulled muscles in the leg. Able to walk on.

## 2020-01-24 NOTE — ED PROVIDER NOTES
"  History     Chief Complaint:  Ankle Pain    HPI   Iva Bowman is a 45 year old female who presents with her mother for the evaluation of ankle pain. The patient reports that earlier this evening she was salting her driveway, slipped on ice, and rolled her left ankle, prompting her to the ED. The patient describes that she is experiencing pain \"all around\" her ankle and that she has been able to lightly put weight on it with walking. She notes that she is also experiencing pain in the front side of her left thigh as well. She states that she did not fall when she twisted her ankle. The patient denies other issues.      Allergies:  Droperidol  Buspirone  Ceclor  Compazine  Dihydroergotamine  Haloperidol  Penicillins  Phenergan  Prednisone  Propranolol  Prozac  Reglan  Sumatriptan  Tessalon  Benzonatate  Cefaclor  Levothyroxine     Medications:    Excedrin  Zyrtec  Benadryl  Atarax  Toradol  Keppra  Synthroid  Naprosyn  Protonix  Zantac  Trintellix     Past Medical History:    Anemia  Anxiety  Arrhythmia  Depression  Fibromyalgia  GERD  Herpes simplex  Hypertension  Hypothyroidism   IBS  Migraines  Seizures  Achilles bursitis   Calculus of kidney  Borderline personality disorder  Somatoform disorder  IBS    Past Surgical History:    Arthroscopy Ankle, right - 2007  Cholecystectomy    Family History:    Sjogren's disorder - Mother  Obesity - Mother  Lipids - Father     Social History:  Smoking status: Former Smoker, Quit Date: 8/5/1998  Alcohol use: No  Drug use: No  Marital Status:       Review of Systems   Musculoskeletal: Positive for arthralgias and myalgias.   All other systems reviewed and are negative.        Physical Exam     Patient Vitals for the past 24 hrs:   BP Temp Temp src Pulse Resp SpO2 Weight   01/23/20 2301 153/98 97.3  F (36.3  C) Oral 94 18 97 % 72.6 kg (160 lb)      Physical Exam  Nursing note and vitals reviewed.  Constitutional: Cooperative.   HENT:   Freely moving " neck  Cardiovascular: Normal rate, regular rhythm.  2+ DP pulse on left.   Pulmonary/Chest: Effort normal   Musculoskeletal: Full ROM of the left lower extremity, including straight leg , dorsal plantar flexion of the ankle.  No tenderness to the midfoot or fifth metatarsal. No effusion to left ankle  Neurological: Alert. Oriented x4.  Strength and sensation in LLE normal.   Skin: Skin is warm and dry.   Psychiatric: Normal mood and affect.     Emergency Department Course   Imaging:  Radiographic findings were communicated with the patient and family who voiced understanding of the findings.    XR Ankle Left G/E 3 Views  IMPRESSION: Mild soft tissue swelling adjacent to the lateral malleolus. The bones are intact. No evidence for fracture. Ankle mortise preserved.  As read by Radiology.     Interventions:  2337, Advil, 600 mg, PO    Emergency Department Course:  Past medical records, nursing notes, and vitals reviewed.  2327: I performed an exam of the patient and obtained history, as documented above.     The patient was sent for a left ankle x ray while in the emergency department, findings above.    0000: I rechecked the patient. Explained findings to patient.     Findings and plan explained to the Patient. Patient discharged home with instructions regarding supportive care, medications, and reasons to return. The importance of close follow-up was reviewed.       Impression & Plan    Medical Decision Making:  Iva Bowman is a 45 year old female who presents for evaluation of left ankle pain. Details of the patient's history can be noted in the HPI. Differential diagnosis included sprain, strain, fracture, dislocation, contusion, amongst others. Due to concern for fracture, xray obtained. This returned showing no bony abnormalities. Additionally, on exam, no signs of septic arthritis, gout, pseudogout, fracture, cellulitis, etc.The patients neurovascular status is normal. Patient also strained her left quad  during the fall. She was able to bare weight and had a normal straight leg raise. No indications for further imaging. Remaining exam negative for other injury. Plan is for protected weightbearing, RICE treatment.. Gentle advancement of ROM discussed with the patient. Tylenol/ibuprofen at home for pain control. They were placed in a gel splint. She can follow up with regular physician or orthopedics for this as well as the ankle injury.  All questions were answered prior to the patient's discharge. She was in agreement with the plan stated above.      Diagnosis:    ICD-10-CM    1. Sprain of other ligament of left ankle, initial encounter S93.492A    2. Strain of left quadriceps muscle, initial encounter S76.112A        Disposition:  Discharged to home.    Scribe Disclosure:  I, Kilo Sweeney, am serving as a scribe at 11:34 PM on 1/23/2020 to document services personally performed by Anthony Jones MD based on my observations and the provider's statements to me.      Kilo Sweeney  1/23/2020   [facility] EMERGENCY DEPARTMENT       Anthony Jones MD  01/24/20 0018

## 2020-01-30 ENCOUNTER — HOSPITAL ENCOUNTER (EMERGENCY)
Facility: CLINIC | Age: 45
Discharge: HOME | End: 2020-01-31
Attending: EMERGENCY MEDICINE | Admitting: EMERGENCY MEDICINE
Payer: MEDICARE

## 2020-01-30 ENCOUNTER — APPOINTMENT (OUTPATIENT)
Dept: GENERAL RADIOLOGY | Facility: CLINIC | Age: 45
End: 2020-01-30
Attending: EMERGENCY MEDICINE
Payer: MEDICARE

## 2020-01-30 DIAGNOSIS — J20.9 ACUTE BRONCHITIS, UNSPECIFIED ORGANISM: ICD-10-CM

## 2020-01-30 DIAGNOSIS — R07.89 ATYPICAL CHEST PAIN: ICD-10-CM

## 2020-01-30 LAB
B-HCG FREE SERPL-ACNC: <5 IU/L
BASOPHILS # BLD AUTO: 0.1 10E9/L (ref 0–0.2)
BASOPHILS NFR BLD AUTO: 0.9 %
DIFFERENTIAL METHOD BLD: NORMAL
EOSINOPHIL # BLD AUTO: 0 10E9/L (ref 0–0.7)
EOSINOPHIL NFR BLD AUTO: 0.4 %
ERYTHROCYTE [DISTWIDTH] IN BLOOD BY AUTOMATED COUNT: 11.6 % (ref 10–15)
HCT VFR BLD AUTO: 41 % (ref 35–47)
HGB BLD-MCNC: 13.3 G/DL (ref 11.7–15.7)
IMM GRANULOCYTES # BLD: 0.1 10E9/L (ref 0–0.4)
IMM GRANULOCYTES NFR BLD: 0.6 %
LYMPHOCYTES # BLD AUTO: 2.6 10E9/L (ref 0.8–5.3)
LYMPHOCYTES NFR BLD AUTO: 28.7 %
MCH RBC QN AUTO: 29.6 PG (ref 26.5–33)
MCHC RBC AUTO-ENTMCNC: 32.4 G/DL (ref 31.5–36.5)
MCV RBC AUTO: 91 FL (ref 78–100)
MONOCYTES # BLD AUTO: 1.1 10E9/L (ref 0–1.3)
MONOCYTES NFR BLD AUTO: 11.8 %
NEUTROPHILS # BLD AUTO: 5.2 10E9/L (ref 1.6–8.3)
NEUTROPHILS NFR BLD AUTO: 57.6 %
NRBC # BLD AUTO: 0 10*3/UL
NRBC BLD AUTO-RTO: 0 /100
PLATELET # BLD AUTO: 253 10E9/L (ref 150–450)
RBC # BLD AUTO: 4.49 10E12/L (ref 3.8–5.2)
WBC # BLD AUTO: 9 10E9/L (ref 4–11)

## 2020-01-30 PROCEDURE — 71046 X-RAY EXAM CHEST 2 VIEWS: CPT

## 2020-01-30 PROCEDURE — 25000128 H RX IP 250 OP 636: Performed by: EMERGENCY MEDICINE

## 2020-01-30 PROCEDURE — 80053 COMPREHEN METABOLIC PANEL: CPT | Performed by: EMERGENCY MEDICINE

## 2020-01-30 PROCEDURE — 85025 COMPLETE CBC W/AUTO DIFF WBC: CPT | Performed by: EMERGENCY MEDICINE

## 2020-01-30 PROCEDURE — 99285 EMERGENCY DEPT VISIT HI MDM: CPT | Mod: 25

## 2020-01-30 PROCEDURE — 96374 THER/PROPH/DIAG INJ IV PUSH: CPT

## 2020-01-30 PROCEDURE — 93005 ELECTROCARDIOGRAM TRACING: CPT

## 2020-01-30 PROCEDURE — 84484 ASSAY OF TROPONIN QUANT: CPT | Performed by: EMERGENCY MEDICINE

## 2020-01-30 PROCEDURE — 25000132 ZZH RX MED GY IP 250 OP 250 PS 637: Mod: GY | Performed by: EMERGENCY MEDICINE

## 2020-01-30 PROCEDURE — 84702 CHORIONIC GONADOTROPIN TEST: CPT

## 2020-01-30 RX ORDER — KETOROLAC TROMETHAMINE 15 MG/ML
10 INJECTION, SOLUTION INTRAMUSCULAR; INTRAVENOUS ONCE
Status: COMPLETED | OUTPATIENT
Start: 2020-01-30 | End: 2020-01-30

## 2020-01-30 RX ORDER — ASPIRIN 81 MG/1
324 TABLET, CHEWABLE ORAL ONCE
Status: COMPLETED | OUTPATIENT
Start: 2020-01-30 | End: 2020-01-30

## 2020-01-30 RX ADMIN — ASPIRIN 81 MG 324 MG: 81 TABLET ORAL at 23:21

## 2020-01-30 RX ADMIN — KETOROLAC TROMETHAMINE 10 MG: 15 INJECTION, SOLUTION INTRAMUSCULAR; INTRAVENOUS at 23:20

## 2020-01-30 ASSESSMENT — ENCOUNTER SYMPTOMS
WHEEZING: 1
FEVER: 1
SHORTNESS OF BREATH: 1
COUGH: 1

## 2020-01-30 NOTE — ED AVS SNAPSHOT
[facility] Emergency Department  [address]  [facility] [address]  Phone:  [phone]  Fax:  [phone]                                    Iva Bowman   MRN: [id number]    Department:  [facility] Emergency Department   Date of Visit:  1/30/2020           After Visit Summary Signature Page    I have received my discharge instructions, and my questions have been answered. I have discussed any challenges I see with this plan with the nurse or doctor.    ..........................................................................................................................................  Patient/Patient Representative Signature      ..........................................................................................................................................  Patient Representative Print Name and Relationship to Patient    ..................................................               ................................................  Date                                   Time    ..........................................................................................................................................  Reviewed by Signature/Title    ...................................................              ..............................................  Date                                               Time          22EPIC Rev 08/18

## 2020-01-31 VITALS
HEIGHT: 64 IN | TEMPERATURE: 98.5 F | BODY MASS INDEX: 27.46 KG/M2 | OXYGEN SATURATION: 99 % | DIASTOLIC BLOOD PRESSURE: 98 MMHG | SYSTOLIC BLOOD PRESSURE: 147 MMHG | HEART RATE: 70 BPM | RESPIRATION RATE: 17 BRPM

## 2020-01-31 LAB
ALBUMIN SERPL-MCNC: 3.5 G/DL (ref 3.4–5)
ALP SERPL-CCNC: 93 U/L (ref 40–150)
ALT SERPL W P-5'-P-CCNC: 21 U/L (ref 0–50)
ANION GAP SERPL CALCULATED.3IONS-SCNC: 8 MMOL/L (ref 3–14)
AST SERPL W P-5'-P-CCNC: 11 U/L (ref 0–45)
BILIRUB SERPL-MCNC: 0.2 MG/DL (ref 0.2–1.3)
BUN SERPL-MCNC: 13 MG/DL (ref 7–30)
CALCIUM SERPL-MCNC: 8.6 MG/DL (ref 8.5–10.1)
CHLORIDE SERPL-SCNC: 107 MMOL/L (ref 94–109)
CO2 SERPL-SCNC: 25 MMOL/L (ref 20–32)
CREAT SERPL-MCNC: 0.81 MG/DL (ref 0.52–1.04)
GFR SERPL CREATININE-BSD FRML MDRD: 89 ML/MIN/{1.73_M2}
GLUCOSE SERPL-MCNC: 104 MG/DL (ref 70–99)
INTERPRETATION ECG - MUSE: NORMAL
POTASSIUM SERPL-SCNC: 3 MMOL/L (ref 3.4–5.3)
PROT SERPL-MCNC: 6.5 G/DL (ref 6.8–8.8)
SODIUM SERPL-SCNC: 140 MMOL/L (ref 133–144)
TROPONIN I SERPL-MCNC: <0.015 UG/L (ref 0–0.04)

## 2020-01-31 PROCEDURE — 25000132 ZZH RX MED GY IP 250 OP 250 PS 637: Mod: GY | Performed by: EMERGENCY MEDICINE

## 2020-01-31 RX ORDER — POTASSIUM CHLORIDE 1.5 G/1.58G
40 POWDER, FOR SOLUTION ORAL ONCE
Status: COMPLETED | OUTPATIENT
Start: 2020-01-31 | End: 2020-01-31

## 2020-01-31 RX ADMIN — POTASSIUM CHLORIDE 40 MEQ: 1.5 FOR SOLUTION ORAL at 00:26

## 2020-01-31 NOTE — ED TRIAGE NOTES
Pt arrives via EMS with report of cough for past two months.  States tonight she began having L-sided chest pain, SOB and diaphoresis prompting her to call EMS.  ABCs in-tact. VSS. A&Ox4.

## 2020-01-31 NOTE — ED PROVIDER NOTES
"  History     Chief Complaint:  Cough    HPI   Iva Bowman is a 45 year old female who presents for evaluation of new left sided chest pain in the setting of a persistent dry cough. She has had the cough for about 2 months and has taken antibiotics - most recently a Z-Demar - without relief. She has yet to be treated with steroids, however. Tonight, around 1800, she reports the onset of left sided chest pain and dyspnea. Given these new symptoms, the patient called 911 to present to the ED. Currently, she reports the chest pain is present. She describes it as a sharp pain that does not radiate into her back or abdomen. It is not necessarily exacerbated by coughing. She also notes some wheezing and low grade fevers. She does have a history of childhood asthma and was recently prescribed an inhaler during the course of this cough. She denies tobacco use.     Allergies:  Droperidol  Buspirone  Ceclor [Cephalosporins]  Compazine  Dihydroergotamine  Haloperidol  Penicillins  Phenergan [Promethazine Hcl]  Prednisone  Propranolol  Prozac [Fluoxetine]  Reglan [Metoclopramide]  Sumatriptan  Benzonatate  Cefaclor  Levothyroxine    Medications:    Levothyroxine  Keppra  Zyrtec  Protonix  Zofran  Amlodipine  Toradol  Zyprexa    Past Medical History:    Anemia  Anxiety   Arrhythmia  Asthma  Depression  Fibromyalgia  GERD  Herpes simplex  Hypertension  Hypothyroidism   IBS  Migraines  Seizure disorder  PTSD    Past Surgical History:    Right ankle arthroscopy  Cardiac ablation  Cholecystectomy  Cervical cryotherapy  D&C  Foot surgery   PE tubes  Tonsillectomy     Family History:    Sjogren's syndrome  Depression  Hyperlipidemia   Bipolar disorder    Social History:  Marital Status:   [4]  Presents via EMS.   Former smoker  Negative for alcohol use.  Negative for drug use.      Review of Systems   Constitutional: Positive for fever (\"low grade\").   Respiratory: Positive for cough, shortness of breath and wheezing.  " "  Cardiovascular: Positive for chest pain.   All other systems reviewed and are negative.        Physical Exam     Patient Vitals for the past 24 hrs:   BP Temp Temp src Pulse Heart Rate Resp SpO2 Height   01/31/20 0015 (!) 147/98 -- -- 70 75 17 99 % --   01/30/20 2315 (!) 158/98 -- -- 73 71 24 97 % --   01/30/20 2300 (!) 146/107 -- -- 71 75 29 97 % --   01/30/20 2248 (!) 147/94 98.5  F (36.9  C) Oral -- 72 20 100 % 1.626 m (5' 4\")      Physical Exam  Constitutional:  Oriented to person, place, and time.   HENT:   Head:    Normocephalic.   Mouth/Throat:   Oropharynx is clear and moist.   Eyes:    EOM are normal. Pupils are equal, round, and reactive to light.   Neck:    Neck supple.   Cardiovascular:  Normal rate, regular rhythm and normal heart sounds.      Exam reveals no gallop and no friction rub.       No murmur heard.  Pulmonary/Chest:  Effort normal and breath sounds normal.      No respiratory distress. No wheezes. No rales.      No reproducible chest wall pain.  Abdominal:   Soft. No distension. No tenderness. No rebound and no guarding.   Musculoskeletal:  Normal range of motion.   Neurological:   Alert and oriented to person, place, and time.           Moves all 4 extremities spontaneously    Skin:    No rash noted. No pallor.     Emergency Department Course   ECG:  Indication: Chest Pain  Time: 2301  Vent. Rate 65 bpm. SC interval 134. QRS duration 88. QT/QTc 410/426. P-R-T axis 30 53 44.    Normal sinus rhythm. Normal ECG. Read time: 2301.    Imaging:  Radiographic findings were communicated with the patient who voiced understanding of the findings.  XR Chest 2 views:   No acute abnormality, as per radiology.      Laboratory:  CBC: WBC: 9.0, HGB: 13.3, PLT: 253  CMP: Glucose 104 (H), K: 3.0 (L), Protein total: 6.5 (L) o/w WNL (Creatinine: 0.81)  2320 Troponin: <0.015  ISTAT hCG: <5.0     Procedures:  None    Interventions:  2320 Toradol, 10 mg, IV injection  2321 Aspirin, 324 mg, PO  0026 Klor-Con, 40 " mEq, PO    Emergency Department Course:  Nursing notes and vitals reviewed.   2254: I performed an exam of the patient as documented above.     IV was inserted and blood was drawn for laboratory testing, results above.   The patient was sent for a chest x-ray while in the emergency department, results above.      0024: I rechecked the patient and discussed the results of her workup thus far.     Findings and plan explained to the Patient. Patient discharged home with instructions regarding supportive care, medications, and reasons to return. The importance of close follow-up was reviewed.     I personally reviewed the laboratory and imaging results with the Patient and answered all related questions prior to discharge.    Impression & Plan      Medical Decision Making:  Iva Bowman is a 45 year old female who complaining of atypical chest pain. She has been coughing for the past 2 months. Differential includes ACS, PE, pneumonia, pneumothorax, costochondritis, and further causes. Work up thus far is otherwise reassuring. She is PERC negative, therefore I would doubt PE and no need for work up for this. Suspicion is for costochondritis with her ongoing cough. She has no signs of a pneumonia or pneumothorax on her chest x-ray. As she is otherwise appropriate for discharge, she was told to continue with ibuprofen and inhaler. She was told to follow-up with her primary doctor or return for any worsening of her symptoms.     Diagnosis:    ICD-10-CM    1. Atypical chest pain R07.89    2. Acute bronchitis, unspecified organism J20.9        Disposition:  discharged to home    Scribe Disclosure:  I, Mary Sydneydoug, am serving as a scribe on 1/30/2020 at 10:54 PM to personally document services performed by Bharathi Bardales MD based on my observations and the provider's statements to me.        1/30/2020   [facility] EMERGENCY DEPARTMENT       Bharathi Bardales MD  01/31/20 0223

## 2020-01-31 NOTE — ED NOTES
Bed: ED11  Expected date: 1/30/20  Expected time: 10:25 PM  Means of arrival: Ambulance  Comments:  Renée 597- 46 yo cough

## 2020-02-05 ENCOUNTER — HOSPITAL ENCOUNTER (EMERGENCY)
Facility: CLINIC | Age: 45
Discharge: HOME | End: 2020-02-05
Attending: PHYSICIAN ASSISTANT | Admitting: PHYSICIAN ASSISTANT
Payer: MEDICARE

## 2020-02-05 VITALS
DIASTOLIC BLOOD PRESSURE: 98 MMHG | HEIGHT: 64 IN | BODY MASS INDEX: 35 KG/M2 | RESPIRATION RATE: 16 BRPM | TEMPERATURE: 99 F | WEIGHT: 205 LBS | OXYGEN SATURATION: 97 % | SYSTOLIC BLOOD PRESSURE: 148 MMHG

## 2020-02-05 DIAGNOSIS — R51.9 GENERALIZED HEADACHE: ICD-10-CM

## 2020-02-05 PROCEDURE — 25000128 H RX IP 250 OP 636: Performed by: EMERGENCY MEDICINE

## 2020-02-05 PROCEDURE — 96361 HYDRATE IV INFUSION ADD-ON: CPT

## 2020-02-05 PROCEDURE — 25000128 H RX IP 250 OP 636: Performed by: PHYSICIAN ASSISTANT

## 2020-02-05 PROCEDURE — 96375 TX/PRO/DX INJ NEW DRUG ADDON: CPT

## 2020-02-05 PROCEDURE — 99284 EMERGENCY DEPT VISIT MOD MDM: CPT | Mod: 25

## 2020-02-05 PROCEDURE — 25800030 ZZH RX IP 258 OP 636: Performed by: PHYSICIAN ASSISTANT

## 2020-02-05 PROCEDURE — 96374 THER/PROPH/DIAG INJ IV PUSH: CPT

## 2020-02-05 PROCEDURE — 96372 THER/PROPH/DIAG INJ SC/IM: CPT | Mod: 59

## 2020-02-05 RX ORDER — ONDANSETRON 2 MG/ML
4 INJECTION INTRAMUSCULAR; INTRAVENOUS ONCE
Status: COMPLETED | OUTPATIENT
Start: 2020-02-05 | End: 2020-02-05

## 2020-02-05 RX ORDER — KETOROLAC TROMETHAMINE 30 MG/ML
30 INJECTION, SOLUTION INTRAMUSCULAR; INTRAVENOUS ONCE
Status: COMPLETED | OUTPATIENT
Start: 2020-02-05 | End: 2020-02-05

## 2020-02-05 RX ORDER — DIPHENHYDRAMINE HYDROCHLORIDE 50 MG/ML
25 INJECTION INTRAMUSCULAR; INTRAVENOUS ONCE
Status: COMPLETED | OUTPATIENT
Start: 2020-02-05 | End: 2020-02-05

## 2020-02-05 RX ORDER — ONDANSETRON 4 MG/1
4 TABLET, ORALLY DISINTEGRATING ORAL ONCE
Status: COMPLETED | OUTPATIENT
Start: 2020-02-05 | End: 2020-02-05

## 2020-02-05 RX ORDER — OLANZAPINE 10 MG/2ML
5 INJECTION, POWDER, FOR SOLUTION INTRAMUSCULAR ONCE
Status: COMPLETED | OUTPATIENT
Start: 2020-02-05 | End: 2020-02-05

## 2020-02-05 RX ADMIN — OLANZAPINE 5 MG: 10 INJECTION, POWDER, FOR SOLUTION INTRAMUSCULAR at 19:02

## 2020-02-05 RX ADMIN — ONDANSETRON HYDROCHLORIDE 4 MG: 2 INJECTION, SOLUTION INTRAMUSCULAR; INTRAVENOUS at 19:01

## 2020-02-05 RX ADMIN — DIPHENHYDRAMINE HYDROCHLORIDE 25 MG: 50 INJECTION, SOLUTION INTRAMUSCULAR; INTRAVENOUS at 19:01

## 2020-02-05 RX ADMIN — KETOROLAC TROMETHAMINE 30 MG: 30 INJECTION, SOLUTION INTRAMUSCULAR at 19:01

## 2020-02-05 RX ADMIN — SODIUM CHLORIDE 1000 ML: 9 INJECTION, SOLUTION INTRAVENOUS at 18:55

## 2020-02-05 RX ADMIN — ONDANSETRON 4 MG: 4 TABLET, ORALLY DISINTEGRATING ORAL at 17:55

## 2020-02-05 ASSESSMENT — ENCOUNTER SYMPTOMS
FEVER: 0
NAUSEA: 1
SPEECH DIFFICULTY: 0
VOMITING: 0
NUMBNESS: 0
HEADACHES: 1
FREQUENCY: 0
DYSURIA: 0
WEAKNESS: 0

## 2020-02-05 ASSESSMENT — MIFFLIN-ST. JEOR: SCORE: 1569.87

## 2020-02-05 NOTE — ED TRIAGE NOTES
Migraine headache since 3-4 hours.  Nauseated.  Headache frontal and top of head. Patient alert and oriented x3.  Airway, breathing and circulation intact.

## 2020-02-06 NOTE — ED PROVIDER NOTES
History     Chief Complaint:  Headache    HPI   Iva Bowman is a 45 year old female who presents with onset of headache. She voices having a headache for the last 3.5 hours. This is not the worst headache of her life, she has a history of headaches, and this feels like her usual headaches. Denies recent head injury, use of anticoagulants, fever, facial numbness, change in vision, difficulty speaking, numbness or weakness of extremities, loss of coordination or difficulty walking.     Allergies:  Benzonatate  Haloperidol  Droperidol  Levothyroxine  Phenothiazines  Prednisone  Aripiprazole  Buspirone  Ceclor [Cephalosporins]  Compazine  Dihydroergotamine  Fluoxetine  Olanzapine  Phenergan [Promethazine Hcl]  Reglan [Metoclopramide]  Risperidone  Tessalon Perles [Benzonatate]  Cefaclor  Gabapentin  Gnp Allergy-Congestion Relief  Penicillins  Propranolol  Pseudoephedrine  Rizatriptan  Sumatriptan    Medications:    Amlodipine  Baby aspirin  Excedrin migraine  Zyrtec  Keppra  Levothyroxine  Melatonin   Protonix  Zantac    Past Medical History:    Anemia  Anxiety  Depression  Fibromyalgia  GERD  Herpes simplex  Hypertension  Hypothyroidism  IBS  Migraines  Seizure disorder  External hemorrhoids   PTSD  Benzodiazepine overdose  Borderline personality disorder  Paroxysmal SVT    Past Surgical History:    Right ankle arthroscopy  Cholecystectomy  Cervical cryotheraphy  D&C  Heel ulcer treatment.  PE tubes   Tonsillectomy     Family History:    Sjogren's syndrome  Depression  Hyperlipidemia     Social History:  Marital Status:   [4]  Presents with mother  Former smoker  Negative for regular alcohol use. Comment: 5 times/year.   Negative for drug use.      Review of Systems   Constitutional: Negative for fever.   Eyes: Negative for visual disturbance.   Gastrointestinal: Positive for nausea. Negative for vomiting.   Genitourinary: Negative for dysuria, frequency and urgency.   Neurological: Positive for  "headaches. Negative for speech difficulty, weakness and numbness.   All other systems reviewed and are negative.    Physical Exam     Patient Vitals for the past 24 hrs:   BP Temp Temp src Heart Rate Resp SpO2 Height Weight   02/05/20 1900 (!) 148/98 -- -- -- -- 97 % -- --   02/05/20 1751 (!) 141/108 99  F (37.2  C) Oral 91 16 97 % 1.626 m (5' 4\") 93 kg (205 lb)      Physical Exam  Vitals signs and nursing note reviewed.   Constitutional:       General: She is not in acute distress.     Appearance: She is not diaphoretic.   HENT:      Head: Atraumatic.      Mouth/Throat:      Pharynx: No oropharyngeal exudate.   Eyes:      General: No scleral icterus.     Pupils: Pupils are equal, round, and reactive to light.   Cardiovascular:      Heart sounds: Normal heart sounds.   Pulmonary:      Effort: No respiratory distress.      Breath sounds: Normal breath sounds.   Abdominal:      General: Bowel sounds are normal.      Palpations: Abdomen is soft.      Tenderness: There is no abdominal tenderness.   Musculoskeletal:         General: No tenderness.   Skin:     General: Skin is warm.      Capillary Refill: Capillary refill takes 2 to 3 seconds.      Findings: No rash.   Neurological:      General: No focal deficit present.      Mental Status: She is alert and oriented to person, place, and time.      Cranial Nerves: No cranial nerve deficit.      Motor: No weakness.       Emergency Department Course     Procedures:    Interventions:  Medications   ondansetron (ZOFRAN-ODT) ODT tab 4 mg (4 mg Oral Given 2/5/20 1755)   0.9% sodium chloride BOLUS (0 mLs Intravenous Stopped 2/5/20 1948)   ketorolac (TORADOL) injection 30 mg (30 mg Intravenous Given 2/5/20 1901)   diphenhydrAMINE (BENADRYL) injection 25 mg (25 mg Intravenous Given 2/5/20 1901)   OLANZapine (zyPREXA) injection 5 mg (5 mg Intramuscular Given 2/5/20 1902)   ondansetron (ZOFRAN) injection 4 mg (4 mg Intravenous Given 2/5/20 1901)       Emergency Department " Course:  ED Course as of Feb 05 2250 Wed Feb 05, 2020 1933 7:33 PM Feeling improved, almost ready for discharge          Impression & Plan      Medical Decision Making:  They present for evaluation of headache. They have reassuring vital signs with evidence of gross hypertension. Due to their markedly elevated blood pressure analgesia provided with repeat BP which had improved. They deny head trauma ruling down skull fracture, subdural or epidural hematoma.  They deny symptoms such as worst headache of life, thunderclap onset, and have a history of headaches ruling down subarachnoid hemorrhage.  They are afebrile, non-toxic and clinically do not appear most c/w meningitis or encephalitis.  They demonstrates no neurological deficits, have a reassuring examination, and malignancy or mass effect appears unlikely.  They demonstrate no changes to raise suspicion for hydrocephalus.   Their headache improved in the ED with medication as per above. They appear a candidate for outpatient management. Presently there is no indication for advanced imaging at this time.     Critical Care time:  none    Diagnosis:    ICD-10-CM    1. Generalized headache R51      Disposition:  discharged to home    Discharge Medications:  New Prescriptions    No medications on file       2/5/2020   [facility] EMERGENCY DEPARTMENT       Alexys Romero PA-C  02/05/20 7505

## 2020-02-08 ENCOUNTER — HOSPITAL ENCOUNTER (OUTPATIENT)
Facility: CLINIC | Age: 45
Setting detail: OBSERVATION
Discharge: HOME | End: 2020-02-09
Attending: INTERNAL MEDICINE | Admitting: INTERNAL MEDICINE
Payer: MEDICARE

## 2020-02-08 DIAGNOSIS — G43.811 OTHER MIGRAINE WITH STATUS MIGRAINOSUS, INTRACTABLE: ICD-10-CM

## 2020-02-08 PROBLEM — G43.909 MIGRAINE HEADACHE: Status: ACTIVE | Noted: 2020-02-08

## 2020-02-08 LAB
ANION GAP SERPL CALCULATED.3IONS-SCNC: 7 MMOL/L (ref 3–14)
BUN SERPL-MCNC: 8 MG/DL (ref 7–30)
CALCIUM SERPL-MCNC: 8.3 MG/DL (ref 8.5–10.1)
CHLORIDE SERPL-SCNC: 112 MMOL/L (ref 94–109)
CO2 SERPL-SCNC: 21 MMOL/L (ref 20–32)
CREAT SERPL-MCNC: 0.74 MG/DL (ref 0.52–1.04)
GFR SERPL CREATININE-BSD FRML MDRD: >90 ML/MIN/{1.73_M2}
GLUCOSE SERPL-MCNC: 176 MG/DL (ref 70–99)
POTASSIUM SERPL-SCNC: 3.8 MMOL/L (ref 3.4–5.3)
SODIUM SERPL-SCNC: 140 MMOL/L (ref 133–144)

## 2020-02-08 PROCEDURE — 96366 THER/PROPH/DIAG IV INF ADDON: CPT

## 2020-02-08 PROCEDURE — 96375 TX/PRO/DX INJ NEW DRUG ADDON: CPT

## 2020-02-08 PROCEDURE — 99285 EMERGENCY DEPT VISIT HI MDM: CPT | Mod: 25

## 2020-02-08 PROCEDURE — 99219 ZZC INITIAL OBSERVATION CARE,LEVL II: CPT | Performed by: PHYSICIAN ASSISTANT

## 2020-02-08 PROCEDURE — 25800030 ZZH RX IP 258 OP 636: Performed by: PHYSICIAN ASSISTANT

## 2020-02-08 PROCEDURE — 80048 BASIC METABOLIC PNL TOTAL CA: CPT | Performed by: PHYSICIAN ASSISTANT

## 2020-02-08 PROCEDURE — 96372 THER/PROPH/DIAG INJ SC/IM: CPT | Mod: XS

## 2020-02-08 PROCEDURE — 25800030 ZZH RX IP 258 OP 636: Performed by: INTERNAL MEDICINE

## 2020-02-08 PROCEDURE — 25000132 ZZH RX MED GY IP 250 OP 250 PS 637: Mod: GY | Performed by: PHYSICIAN ASSISTANT

## 2020-02-08 PROCEDURE — 25000128 H RX IP 250 OP 636: Performed by: INTERNAL MEDICINE

## 2020-02-08 PROCEDURE — 96361 HYDRATE IV INFUSION ADD-ON: CPT

## 2020-02-08 PROCEDURE — 36415 COLL VENOUS BLD VENIPUNCTURE: CPT | Performed by: PHYSICIAN ASSISTANT

## 2020-02-08 PROCEDURE — G0378 HOSPITAL OBSERVATION PER HR: HCPCS

## 2020-02-08 PROCEDURE — 25000125 ZZHC RX 250: Performed by: INTERNAL MEDICINE

## 2020-02-08 PROCEDURE — 96365 THER/PROPH/DIAG IV INF INIT: CPT

## 2020-02-08 RX ORDER — SODIUM CHLORIDE AND POTASSIUM CHLORIDE 150; 900 MG/100ML; MG/100ML
INJECTION, SOLUTION INTRAVENOUS CONTINUOUS
Status: DISCONTINUED | OUTPATIENT
Start: 2020-02-08 | End: 2020-02-09 | Stop reason: HOSPADM

## 2020-02-08 RX ORDER — BISACODYL 10 MG
10 SUPPOSITORY, RECTAL RECTAL DAILY PRN
Status: DISCONTINUED | OUTPATIENT
Start: 2020-02-08 | End: 2020-02-09 | Stop reason: HOSPADM

## 2020-02-08 RX ORDER — ALBUTEROL SULFATE 90 UG/1
2 AEROSOL, METERED RESPIRATORY (INHALATION) EVERY 4 HOURS PRN
Status: DISCONTINUED | OUTPATIENT
Start: 2020-02-08 | End: 2020-02-09 | Stop reason: HOSPADM

## 2020-02-08 RX ORDER — LEVOTHYROXINE SODIUM 50 UG/1
50 TABLET ORAL DAILY
Status: DISCONTINUED | OUTPATIENT
Start: 2020-02-09 | End: 2020-02-09 | Stop reason: CLARIF

## 2020-02-08 RX ORDER — DIPHENHYDRAMINE HYDROCHLORIDE 50 MG/ML
50 INJECTION INTRAMUSCULAR; INTRAVENOUS EVERY 6 HOURS PRN
Status: DISCONTINUED | OUTPATIENT
Start: 2020-02-08 | End: 2020-02-09 | Stop reason: HOSPADM

## 2020-02-08 RX ORDER — LORAZEPAM 2 MG/ML
.5-1 INJECTION INTRAMUSCULAR EVERY 4 HOURS PRN
Status: DISCONTINUED | OUTPATIENT
Start: 2020-02-08 | End: 2020-02-09 | Stop reason: HOSPADM

## 2020-02-08 RX ORDER — LISINOPRIL 20 MG/1
20 TABLET ORAL DAILY
Status: DISCONTINUED | OUTPATIENT
Start: 2020-02-09 | End: 2020-02-09 | Stop reason: HOSPADM

## 2020-02-08 RX ORDER — ONDANSETRON 4 MG/1
8 TABLET, ORALLY DISINTEGRATING ORAL EVERY 8 HOURS PRN
Status: DISCONTINUED | OUTPATIENT
Start: 2020-02-08 | End: 2020-02-09 | Stop reason: HOSPADM

## 2020-02-08 RX ORDER — ONDANSETRON 8 MG/1
8 TABLET, FILM COATED ORAL EVERY 8 HOURS PRN
COMMUNITY

## 2020-02-08 RX ORDER — SODIUM CHLORIDE 9 MG/ML
1000 INJECTION, SOLUTION INTRAVENOUS CONTINUOUS
Status: DISCONTINUED | OUTPATIENT
Start: 2020-02-08 | End: 2020-02-08

## 2020-02-08 RX ORDER — NALOXONE HYDROCHLORIDE 0.4 MG/ML
.1-.4 INJECTION, SOLUTION INTRAMUSCULAR; INTRAVENOUS; SUBCUTANEOUS
Status: DISCONTINUED | OUTPATIENT
Start: 2020-02-08 | End: 2020-02-09 | Stop reason: HOSPADM

## 2020-02-08 RX ORDER — FLUTICASONE PROPIONATE 110 UG/1
1 AEROSOL, METERED RESPIRATORY (INHALATION) 2 TIMES DAILY
COMMUNITY

## 2020-02-08 RX ORDER — DIPHENHYDRAMINE HCL 25 MG
50 CAPSULE ORAL EVERY 4 HOURS PRN
Status: DISCONTINUED | OUTPATIENT
Start: 2020-02-08 | End: 2020-02-09 | Stop reason: HOSPADM

## 2020-02-08 RX ORDER — ONDANSETRON 2 MG/ML
4 INJECTION INTRAMUSCULAR; INTRAVENOUS ONCE
Status: DISCONTINUED | OUTPATIENT
Start: 2020-02-08 | End: 2020-02-08

## 2020-02-08 RX ORDER — LISINOPRIL 20 MG/1
20 TABLET ORAL DAILY
COMMUNITY

## 2020-02-08 RX ORDER — OLANZAPINE 10 MG/2ML
10 INJECTION, POWDER, FOR SOLUTION INTRAMUSCULAR ONCE
Status: COMPLETED | OUTPATIENT
Start: 2020-02-08 | End: 2020-02-08

## 2020-02-08 RX ORDER — FLUTICASONE PROPIONATE 110 UG/1
1 AEROSOL, METERED RESPIRATORY (INHALATION) 2 TIMES DAILY
Status: DISCONTINUED | OUTPATIENT
Start: 2020-02-08 | End: 2020-02-08 | Stop reason: CLARIF

## 2020-02-08 RX ORDER — LEVETIRACETAM 500 MG/1
1000 TABLET ORAL 2 TIMES DAILY
Status: DISCONTINUED | OUTPATIENT
Start: 2020-02-08 | End: 2020-02-09 | Stop reason: HOSPADM

## 2020-02-08 RX ORDER — DEXAMETHASONE SODIUM PHOSPHATE 10 MG/ML
10 INJECTION, SOLUTION INTRAMUSCULAR; INTRAVENOUS ONCE
Status: COMPLETED | OUTPATIENT
Start: 2020-02-08 | End: 2020-02-08

## 2020-02-08 RX ORDER — DIPHENHYDRAMINE HYDROCHLORIDE 50 MG/ML
50 INJECTION INTRAMUSCULAR; INTRAVENOUS ONCE
Status: COMPLETED | OUTPATIENT
Start: 2020-02-08 | End: 2020-02-08

## 2020-02-08 RX ORDER — LANOLIN ALCOHOL/MO/W.PET/CERES
3 CREAM (GRAM) TOPICAL
Status: DISCONTINUED | OUTPATIENT
Start: 2020-02-08 | End: 2020-02-09 | Stop reason: HOSPADM

## 2020-02-08 RX ORDER — DIPHENHYDRAMINE HYDROCHLORIDE 50 MG/ML
50 INJECTION INTRAMUSCULAR; INTRAVENOUS ONCE
Status: DISCONTINUED | OUTPATIENT
Start: 2020-02-08 | End: 2020-02-08

## 2020-02-08 RX ORDER — MEDROXYPROGESTERONE ACETATE 10 MG
10 TABLET ORAL DAILY
COMMUNITY

## 2020-02-08 RX ORDER — KETOROLAC TROMETHAMINE 15 MG/ML
15 INJECTION, SOLUTION INTRAMUSCULAR; INTRAVENOUS EVERY 6 HOURS PRN
Status: DISCONTINUED | OUTPATIENT
Start: 2020-02-08 | End: 2020-02-09 | Stop reason: HOSPADM

## 2020-02-08 RX ORDER — ACETAMINOPHEN 325 MG/1
975 TABLET ORAL 4 TIMES DAILY
Status: DISCONTINUED | OUTPATIENT
Start: 2020-02-08 | End: 2020-02-09 | Stop reason: HOSPADM

## 2020-02-08 RX ORDER — KETOROLAC TROMETHAMINE 15 MG/ML
15 INJECTION, SOLUTION INTRAMUSCULAR; INTRAVENOUS ONCE
Status: COMPLETED | OUTPATIENT
Start: 2020-02-08 | End: 2020-02-08

## 2020-02-08 RX ORDER — OLANZAPINE 10 MG/2ML
10 INJECTION, POWDER, FOR SOLUTION INTRAMUSCULAR ONCE
Status: DISCONTINUED | OUTPATIENT
Start: 2020-02-08 | End: 2020-02-08

## 2020-02-08 RX ORDER — ALBUTEROL SULFATE 90 UG/1
2 AEROSOL, METERED RESPIRATORY (INHALATION) EVERY 4 HOURS PRN
COMMUNITY
End: 2020-02-21

## 2020-02-08 RX ORDER — ONDANSETRON 2 MG/ML
8 INJECTION INTRAMUSCULAR; INTRAVENOUS EVERY 8 HOURS PRN
Status: DISCONTINUED | OUTPATIENT
Start: 2020-02-08 | End: 2020-02-09 | Stop reason: HOSPADM

## 2020-02-08 RX ADMIN — OLANZAPINE 10 MG: 5 TABLET, ORALLY DISINTEGRATING ORAL at 22:35

## 2020-02-08 RX ADMIN — VALPROATE SODIUM 500 MG: 100 INJECTION, SOLUTION INTRAVENOUS at 18:43

## 2020-02-08 RX ADMIN — DEXAMETHASONE SODIUM PHOSPHATE 10 MG: 10 INJECTION, SOLUTION INTRAMUSCULAR; INTRAVENOUS at 16:33

## 2020-02-08 RX ADMIN — OLANZAPINE 10 MG: 10 INJECTION, POWDER, FOR SOLUTION INTRAMUSCULAR at 17:48

## 2020-02-08 RX ADMIN — ACETAMINOPHEN 975 MG: 325 TABLET, FILM COATED ORAL at 22:35

## 2020-02-08 RX ADMIN — SODIUM CHLORIDE 1000 ML: 9 INJECTION, SOLUTION INTRAVENOUS at 16:15

## 2020-02-08 RX ADMIN — ACETAMINOPHEN, ASPIRIN AND CAFFEINE 2 TABLET: 250; 250; 65 TABLET, FILM COATED ORAL at 23:50

## 2020-02-08 RX ADMIN — KETOROLAC TROMETHAMINE 15 MG: 15 INJECTION, SOLUTION INTRAMUSCULAR; INTRAVENOUS at 16:32

## 2020-02-08 RX ADMIN — SODIUM CHLORIDE 1000 ML: 9 INJECTION, SOLUTION INTRAVENOUS at 18:43

## 2020-02-08 RX ADMIN — DIPHENHYDRAMINE HYDROCHLORIDE 50 MG: 50 INJECTION, SOLUTION INTRAMUSCULAR; INTRAVENOUS at 22:47

## 2020-02-08 RX ADMIN — SODIUM CHLORIDE, POTASSIUM CHLORIDE, SODIUM LACTATE AND CALCIUM CHLORIDE 1000 ML: 600; 310; 30; 20 INJECTION, SOLUTION INTRAVENOUS at 22:51

## 2020-02-08 RX ADMIN — LEVETIRACETAM 1000 MG: 500 TABLET, FILM COATED ORAL at 23:50

## 2020-02-08 RX ADMIN — DIPHENHYDRAMINE HYDROCHLORIDE 50 MG: 50 INJECTION, SOLUTION INTRAMUSCULAR; INTRAVENOUS at 16:33

## 2020-02-08 ASSESSMENT — MIFFLIN-ST. JEOR
SCORE: 1569.87
SCORE: 1614.33

## 2020-02-08 NOTE — ED TRIAGE NOTES
Pt has migraine headache since yesterday.  She recently had similar headache on Wednesday and was seen in ED.  She reports this one is worse.

## 2020-02-08 NOTE — LETTER
Key Recommendations:  patient admitted 2/8 diagnosed with Migraine. Patient has known history of frequent admissions for Migraines, >34 ER visits in the last 6 months. Met with patient at the bedside. Patient reporting long history of migraines for the past 20 years. States she hasn't been able to get them under control. Patient reports her pain provider is currently managing her Migraines. Per chart review, patient has been followed by [facility], Dr. Nino,  [facility] and [facility]. SW at [facility] in process of developing a Unique Treatment plan.      Patient states she lives alone with her animals. She has support from her mother, Stefanie.  She receives services [employer]. She reports having a ILS (Ind ) and is working on getting a . Patient also reports being seen by her S in [place]. Patient reports being connected with multiple providers. She expressed migranes, depression- difficult relationship with son, and ankles are most bothersome. Discussed following up with her providers and offered assistance. Patient states she will see her pain specialist, as her PCP, CHATO Hall, is not managing her migraines. Patient declined support with scheduling follow up with pain, neurology or any other providers. States she plans to eventually seek care in [place] for further Migraine management.      Patient currently followed by [facility] Care coordination, a handoff  sent for continue support with health management and coordination with multiple provider involvement.     Eduarda Badillo RN    AVS/Discharge Summary is the source of truth; this is a helpful guide for improved communication of patient story

## 2020-02-08 NOTE — ED PROVIDER NOTES
"  History     Chief Complaint:  Headache and Nausea    HPI   Iva Bowman is a 45 year old female who presents with a migraine.  She was seen here with similar symptoms several days ago and did get relief after medication.  The headache however returned today and is even worse than usual.  Patient says she has an upcoming appointment with a new neurologist because her last neurologist gave up.  She has been quite nauseated and has vomited.  She says this headache is typical for 1 of her migraines.  No fever or other new symptoms.    Allergies:  Benzonatate  Haloperidol  Droperidol  Levothyroxine  Phenothiazine  Prednisone  Aripiprazole  Buspirone  Ceclor  Compazine  Dihydroergotamine  Fluoxetine  Olanzapine  Phenergan  Reglan  Risperidone  Tessalon  Cefaclor  Gabapentin  Penicillins  Propranolol  Pseudoephedrine  Rizatriptan  Sumatriptan    Medications:    Norvasc  Aspirin 81 mg  Keppra   Synthyroid  Melatonin  Protonix  Trintellix  Zantac    Past Medical History:    Anemia  Anxiety  Arrhythmia  Depressive disorder  Fibromyalgia  GERD  Herpes simplex  Hypertension  Hypothyroidism  IBS  Migraine  Seizures  Partial epilepsy   Suicide and self-inflicted injury  Paroxysmal SVT    Past Surgical History:    Arthroscopy ankle  Cardiac ablation  Cholecystectomy, laparoscopic   Cryotherapy, cervical  D & C  Foot surgery  Heel ulcer treatment  PE tubes  Retrocalcaneal bursitis and amie deformity treatment  Tonsillectomy    Family History:    Mother - Sjogrens, obesity, depression  Father - lipids    Social History:  The patient was accompanied to the ED by her mother.  Smoking Status: Former  Smokeless Tobacco: No  Alcohol Use: Yes  Drug Use: No   Marital Status:   [4]     Review of Systems   All other systems reviewed and are negative.      Physical Exam   First Vitals:  BP: (!) 144/105  Heart Rate: 88  Temp: 98.4  F (36.9  C)  Resp: 20  Height: 162.6 cm (5' 4\")  Weight: 93 kg (205 lb)  SpO2: 97 " %      Physical Exam  Constitutional:       Comments: Pleasant and cooperative   HENT:      Right Ear: Tympanic membrane normal.      Left Ear: Tympanic membrane normal.      Mouth/Throat:      Pharynx: No posterior oropharyngeal erythema.   Eyes:      Conjunctiva/sclera: Conjunctivae normal.   Neck:      Musculoskeletal: Neck supple.   Cardiovascular:      Rate and Rhythm: Normal rate and regular rhythm.      Heart sounds: Normal heart sounds.   Pulmonary:      Effort: Pulmonary effort is normal.      Breath sounds: Normal breath sounds.   Abdominal:      General: Bowel sounds are normal. There is no distension.      Palpations: Abdomen is soft.      Tenderness: There is no abdominal tenderness. There is no guarding or rebound.   Musculoskeletal: Normal range of motion.   Skin:     General: Skin is warm and dry.   Neurological:      Mental Status: She is alert.         Emergency Department Course         Interventions:  Medications   ondansetron (ZOFRAN) injection 4 mg (4 mg Intravenous Not Given 2/8/20 1748)   valproate (DEPACON) 500 mg in sodium chloride 0.9 % 50 mL intermittent infusion (has no administration in time range)   ketorolac (TORADOL) injection 15 mg (15 mg Intravenous Given 2/8/20 1632)   diphenhydrAMINE (BENADRYL) injection 50 mg (50 mg Intravenous Given 2/8/20 1633)   dexamethasone PF (DECADRON) injection 10 mg (10 mg Intravenous Given 2/8/20 1633)   OLANZapine (zyPREXA) injection 10 mg (10 mg Intramuscular Given 2/8/20 1748)           Emergency Department Course:         Impression & Plan      Medical Decision Making:    Iva Bowman is a 45 year old female with a long history of migraines who presents 1 of her typical severe headaches.  After multiple medications her symptoms are not yet well controlled.  I have contacted MICHAEL Albarran of the hospitalist service.  We will give Depakote and 1 L of fluid.  If the patient has not had relief by then she will be admitted to observation for  further management.  I will sign the case out to my partner, MICHAEL Bocanegra for disposition.      Diagnosis:    ICD-10-CM    1. Other migraine with status migrainosus, intractable G43.811        Disposition:  pending    Discharge Medications:  New Prescriptions    No medications on file       Yoana Staton  2/8/2020   [facility] EMERGENCY DEPARTMENT       Ynes Donato MD  02/08/20 1847

## 2020-02-09 VITALS
SYSTOLIC BLOOD PRESSURE: 150 MMHG | HEART RATE: 85 BPM | BODY MASS INDEX: 36.67 KG/M2 | RESPIRATION RATE: 16 BRPM | HEIGHT: 64 IN | DIASTOLIC BLOOD PRESSURE: 79 MMHG | TEMPERATURE: 98.3 F | WEIGHT: 214.8 LBS | OXYGEN SATURATION: 96 %

## 2020-02-09 PROCEDURE — 96376 TX/PRO/DX INJ SAME DRUG ADON: CPT

## 2020-02-09 PROCEDURE — 96361 HYDRATE IV INFUSION ADD-ON: CPT

## 2020-02-09 PROCEDURE — 25000132 ZZH RX MED GY IP 250 OP 250 PS 637: Mod: GY | Performed by: PHYSICIAN ASSISTANT

## 2020-02-09 PROCEDURE — 25000128 H RX IP 250 OP 636: Performed by: PHYSICIAN ASSISTANT

## 2020-02-09 PROCEDURE — 99217 ZZC OBSERVATION CARE DISCHARGE: CPT | Performed by: PHYSICIAN ASSISTANT

## 2020-02-09 PROCEDURE — G0378 HOSPITAL OBSERVATION PER HR: HCPCS

## 2020-02-09 RX ORDER — OLANZAPINE 10 MG/1
10 TABLET ORAL
Status: COMPLETED | OUTPATIENT
Start: 2020-02-09 | End: 2020-02-09

## 2020-02-09 RX ORDER — PANTOPRAZOLE SODIUM 40 MG/1
40 TABLET, DELAYED RELEASE ORAL DAILY
Status: DISCONTINUED | OUTPATIENT
Start: 2020-02-09 | End: 2020-02-09 | Stop reason: HOSPADM

## 2020-02-09 RX ADMIN — ONDANSETRON HYDROCHLORIDE 8 MG: 2 INJECTION, SOLUTION INTRAMUSCULAR; INTRAVENOUS at 11:27

## 2020-02-09 RX ADMIN — DIPHENHYDRAMINE HYDROCHLORIDE 50 MG: 25 CAPSULE ORAL at 04:34

## 2020-02-09 RX ADMIN — KETOROLAC TROMETHAMINE 15 MG: 15 INJECTION, SOLUTION INTRAMUSCULAR; INTRAVENOUS at 09:28

## 2020-02-09 RX ADMIN — KETOROLAC TROMETHAMINE 15 MG: 15 INJECTION, SOLUTION INTRAMUSCULAR; INTRAVENOUS at 01:24

## 2020-02-09 RX ADMIN — POTASSIUM CHLORIDE AND SODIUM CHLORIDE: 900; 150 INJECTION, SOLUTION INTRAVENOUS at 09:09

## 2020-02-09 RX ADMIN — OLANZAPINE 10 MG: 10 TABLET, FILM COATED ORAL at 11:23

## 2020-02-09 RX ADMIN — POTASSIUM CHLORIDE AND SODIUM CHLORIDE: 900; 150 INJECTION, SOLUTION INTRAVENOUS at 00:12

## 2020-02-09 RX ADMIN — ACETAMINOPHEN, ASPIRIN AND CAFFEINE 2 TABLET: 250; 250; 65 TABLET, FILM COATED ORAL at 11:26

## 2020-02-09 RX ADMIN — VORTIOXETINE 30 MG: 20 TABLET, FILM COATED ORAL at 09:02

## 2020-02-09 RX ADMIN — MELATONIN TAB 3 MG 3 MG: 3 TAB at 00:47

## 2020-02-09 RX ADMIN — LISINOPRIL 20 MG: 20 TABLET ORAL at 09:01

## 2020-02-09 RX ADMIN — DIPHENHYDRAMINE HYDROCHLORIDE 50 MG: 25 CAPSULE ORAL at 15:47

## 2020-02-09 RX ADMIN — PANTOPRAZOLE SODIUM 40 MG: 40 TABLET, DELAYED RELEASE ORAL at 09:39

## 2020-02-09 RX ADMIN — DIPHENHYDRAMINE HYDROCHLORIDE 50 MG: 50 INJECTION, SOLUTION INTRAMUSCULAR; INTRAVENOUS at 09:28

## 2020-02-09 RX ADMIN — KETOROLAC TROMETHAMINE 15 MG: 15 INJECTION, SOLUTION INTRAMUSCULAR; INTRAVENOUS at 15:47

## 2020-02-09 RX ADMIN — LEVETIRACETAM 1000 MG: 500 TABLET, FILM COATED ORAL at 09:01

## 2020-02-09 RX ADMIN — ACETAMINOPHEN 975 MG: 325 TABLET, FILM COATED ORAL at 09:01

## 2020-02-09 NOTE — PHARMACY-ADMISSION MEDICATION HISTORY
Admission medication history interview status for this patient is complete. See [facility] admission navigator for allergy information, prior to admission medications and immunization status.     Medication history interview source(s):Patient  Medication history resources (including written lists, pill bottles, clinic record):None  Primary pharmacy:[facility]    Changes made to PTA medication list:  Added: medroxyprogesterone, zofran, lisinopril, albuterol, flovent, emgality  Deleted: zantac, astelin, zyrtec  Changed: trintellix from 20 mg to 30 mg daily    Actions taken by pharmacist (provider contacted, etc):None     Additional medication history information:Pt states she is suppose to take an eye drop two times daily and thinks it is Ocuflox. Pt states she lost the bottle. Per [facility], there is no record of ocuflox.     Medication reconciliation/reorder completed by provider prior to medication history? No    Do you take OTC medications (eg tylenol, ibuprofen, fish oil, eye/ear drops, etc)? Y(Y/N)    For patients on insulin therapy: N (Y/N)    Time spent in this activity: 25 min    Prior to Admission medications    Medication Sig Last Dose Taking? Auth Provider   acetaminophen (TYLENOL) 500 MG tablet Take 1,000 mg by mouth every 6 hours as needed for pain 2/8/2020 at am Yes Unknown, Entered By History   albuterol (PROAIR HFA/PROVENTIL HFA/VENTOLIN HFA) 108 (90 Base) MCG/ACT inhaler Inhale 2 puffs into the lungs every 4 hours as needed for shortness of breath / dyspnea or wheezing  at prn Yes Unknown, Entered By History   cholecalciferol ( ULTRA STRENGTH) 2000 units CAPS Take 2,000 Units by mouth daily  2/7/2020 at Unknown time Yes Reported, Patient   fluticasone (FLONASE) 50 MCG/ACT nasal spray Spray 1-2 sprays into both nostrils daily as needed.  at prn Yes Maya Ta MD   fluticasone (FLOVENT HFA) 110 MCG/ACT inhaler Inhale 1 puff into the lungs 2 times daily 2/7/2020 at Unknown time Yes Unknown,  Entered By History   galcanezumab-gnlm (EMGALITY) 120 MG/ML injection Inject 120 mg Subcutaneous every 30 days 1/20/2020 Yes Unknown, Entered By History   ibuprofen (ADVIL/MOTRIN) 200 MG tablet Take 400 mg by mouth every 6 hours as needed for mild pain or fever (migraine)   at prn Yes Unknown, Entered By History   levETIRAcetam (KEPPRA) 500 MG tablet Take 2 tablets (1,000 mg) by mouth 2 times daily 2/7/2020 at Unknown time Yes Jon Boss MD   levothyroxine (SYNTHROID) 50 MCG tablet Take 1 tablet by mouth daily. 2/7/2020 at Unknown time Yes Maya Ta MD   lisinopril (PRINIVIL/ZESTRIL) 20 MG tablet Take 20 mg by mouth daily 2/7/2020 at Unknown time Yes Unknown, Entered By History   medroxyPROGESTERone (PROVERA) 10 MG tablet Take 10 mg by mouth daily 2/7/2020 at Unknown time Yes Unknown, Entered By History   MELATONIN PO Take 6 mg by mouth nightly as needed (sleep)   at prn Yes Reported, Patient   multivitamin, therapeutic with minerals (MULTI-VITAMIN) TABS Take 1 tablet by mouth daily 2/7/2020 at Unknown time Yes Reported, Patient   naproxen (NAPROSYN) 500 MG tablet Take 500 mg by mouth 2 times daily as needed for moderate pain Take with food  at prn Yes Unknown, Entered By History   ondansetron (ZOFRAN) 8 MG tablet Take 8 mg by mouth every 8 hours as needed for nausea  at prn Yes Unknown, Entered By History   pantoprazole (PROTONIX) 40 MG EC tablet Take 40 mg by mouth daily 2/7/2020 at Unknown time Yes Unknown, Entered By History   polyethylene glycol (MIRALAX/GLYCOLAX) packet Take 1 packet by mouth daily 2/7/2020 at Unknown time Yes Unknown, Entered By History   vortioxetine (TRINTELLIX) 20 MG tablet Take 20 mg by mouth daily Take with 10 mg tablet for a total of 30 mg daily 2/7/2020 at Unknown time Yes Reported, Patient   vortioxetine (TRINTELLIX/BRINTELLIX) 10 MG tablet Take 10 mg by mouth daily Take with 20 mg tablet for a total of 30 mg daily 2/7/2020 at Unknown time Yes Unknown, Entered By  History   aspirin-acetaminophen-caffeine (EXCEDRIN MIGRAINE) 250-250-65 MG tablet Take 1 tablet by mouth as needed for headaches (Migraine)  More than a month at Unknown time  Unknown, Entered By History   UNABLE TO FIND MEDICATION NAME: eye drops (possibly ocuflox). Instill 1 drop into both eyes two times daily.   Unknown, Entered By History

## 2020-02-09 NOTE — DISCHARGE SUMMARY
"[facility] / Observation Unit  Discharge Summary        Iva Bowman MRN# [id number]   YOB: 1975 Age: 45 year old     Date of Admission:  2/8/2020  Date of Discharge:  2/9/2020  Admitting Physician:  Les Avila MD  Discharge Physician: Temi Farmer PA-C  Discharging Service: Hospitalist      Primary Provider: Theresa Hall  Primary Care Physician Phone Number: [phone]         Primary Discharge Diagnoses:    Iva Bowman was admitted on 2/8/2020 with complaints of intractable migraine.     1. Intractable migraine - 34 ER visits for migraine since August.  Intolerant of triptans, ergot, and phenothiazines, as well as antipsychotics like Haldol and droperidol. Pt states she had an infusion plan at an oncology clinic in [place] for acute management of migraines (Toradol, Benadryl and Zyprexa), not available on the weekend. Was seeing a Neurologist, but no longer, is planning to go back to a pain clinic for Botox. Headache remained stable on toradol, benadryl and zyprexa.           Secondary Discharge Diagnoses:     Past Medical History:   Diagnosis Date     Anemia      Anxiety      Arrhythmia 2018    ablation for \"racing heartbeat\"     Depressive disorder      Fibromyalgia      Gastro-oesophageal reflux disease      Herpes simplex 05/19/2006     Hypertension      Hypothyroidism      IBS (irritable bowel syndrome)      Migraine     Common migraine.  Neuro consult--resolved with treatment of seizures     Seizures     last seizure 2011 - petit mal                Code Status:      Full Code        Brief Hospital Summary:        Reason for your hospital stay      Intractable migraine             Please refer to initial admission history and physical for further details.   Briefly, Iva Bowman was admitted on 2/8/2020 for complaints of intractable migraine.     Initial exam in the ED was unremarkable. Symptoms did not improve with multiple ED interventions. Pt was registered " to the Observation Unit for further evaluation.     Pt was continued on supportive cares and a combination of migraine medications.   On the day of discharge, patient had some improvement of headache, no new neurological sx. Vitals remained stable and pt was deemed safe for discharge. Medications were reviewed and adjustments made as necessary. Pt is instructed to follow up as below.           Significant Lab During Hospitalization:        Recent Labs   Lab 02/08/20  2256      POTASSIUM 3.8   CHLORIDE 112*   CO2 21   ANIONGAP 7   *   BUN 8   CR 0.74   GFRESTIMATED >90   GFRESTBLACK >90   CRYSTAL 8.3*              Significant Imaging During Hospitalization:      No results found for this or any previous visit (from the past 48 hour(s)).           Pending Results:        Unresulted Labs Ordered in the Past 30 Days of this Admission     No orders found for last 31 day(s).              Consultations This Hospital Stay:      No consultations were requested during this admission         Discharge Instructions and Follow-Up:      Follow-up Appointments     Follow-up and recommended labs and tests       Follow up with primary care provider, Theresa Hall, within 7 days for   hospital follow- up.  No follow up labs or test are needed.                   Discharge Disposition:      Discharged to home         Discharge Medications:        Current Discharge Medication List      CONTINUE these medications which have NOT CHANGED    Details   acetaminophen (TYLENOL) 500 MG tablet Take 1,000 mg by mouth every 6 hours as needed for pain      albuterol (PROAIR HFA/PROVENTIL HFA/VENTOLIN HFA) 108 (90 Base) MCG/ACT inhaler Inhale 2 puffs into the lungs every 4 hours as needed for shortness of breath / dyspnea or wheezing    Comments: Pharmacy may dispense brand covered by insurance (Proair, or proventil or ventolin or generic albuterol inhaler)      cholecalciferol ( ULTRA STRENGTH) 2000 units CAPS Take 2,000 Units by mouth  daily       fluticasone (FLONASE) 50 MCG/ACT nasal spray Spray 1-2 sprays into both nostrils daily as needed.  Qty: 1 Package, Refills: 2    Associated Diagnoses: Seasonal allergic rhinitis      fluticasone (FLOVENT HFA) 110 MCG/ACT inhaler Inhale 1 puff into the lungs 2 times daily      galcanezumab-gnlm (EMGALITY) 120 MG/ML injection Inject 120 mg Subcutaneous every 30 days      ibuprofen (ADVIL/MOTRIN) 200 MG tablet Take 400 mg by mouth every 6 hours as needed for mild pain or fever (migraine)       levETIRAcetam (KEPPRA) 500 MG tablet Take 2 tablets (1,000 mg) by mouth 2 times daily  Qty: 360 tablet, Refills: 11    Associated Diagnoses: Seizure disorder (H)      levothyroxine (SYNTHROID) 50 MCG tablet Take 1 tablet by mouth daily.  Qty: 90 tablet, Refills: 1    Associated Diagnoses: Hypothyroidism      lisinopril (PRINIVIL/ZESTRIL) 20 MG tablet Take 20 mg by mouth daily      medroxyPROGESTERone (PROVERA) 10 MG tablet Take 10 mg by mouth daily      MELATONIN PO Take 6 mg by mouth nightly as needed (sleep)       multivitamin, therapeutic with minerals (MULTI-VITAMIN) TABS Take 1 tablet by mouth daily      naproxen (NAPROSYN) 500 MG tablet Take 500 mg by mouth 2 times daily as needed for moderate pain Take with food      ondansetron (ZOFRAN) 8 MG tablet Take 8 mg by mouth every 8 hours as needed for nausea      pantoprazole (PROTONIX) 40 MG EC tablet Take 40 mg by mouth daily      polyethylene glycol (MIRALAX/GLYCOLAX) packet Take 1 packet by mouth daily      !! vortioxetine (TRINTELLIX) 20 MG tablet Take 20 mg by mouth daily Take with 10 mg tablet for a total of 30 mg daily      !! vortioxetine (TRINTELLIX/BRINTELLIX) 10 MG tablet Take 10 mg by mouth daily Take with 20 mg tablet for a total of 30 mg daily      aspirin-acetaminophen-caffeine (EXCEDRIN MIGRAINE) 250-250-65 MG tablet Take 1 tablet by mouth as needed for headaches (Migraine)       UNABLE TO FIND MEDICATION NAME: eye drops (possibly ocuflox). Instill 1  "drop into both eyes two times daily.       !! - Potential duplicate medications found. Please discuss with provider.              Allergies:         Allergies   Allergen Reactions     Benzonatate Rash, Other (See Comments) and Unknown     Other reaction(s): *Unknown     Haloperidol      Other reaction(s): Angioedema  Other reaction(s): Angioedema     Droperidol Unknown and Rash     Other reaction(s): *Unknown - Follow up needed  Involuntary movements  Facial swelling and movements. Dystonic reaction    Other reaction(s): Muscle Aches/Weakness  PN: uncontrolled movemnts  Involuntary movements       Levothyroxine Rash     PN: Patient reports she only take Synthroid brand name  PN: Patient reports she only take Synthroid brand name       Phenothiazines Unknown, Other (See Comments) and Rash     PN: rash,   PN: uncontrolled  movements  uncontrolled  movements       Prednisone Other (See Comments) and Anxiety     suicidal  Worsening depression, PN: Makes depression worse  \"I get suicidal from this\"       Aripiprazole Other (See Comments)     PN: Dystonic  \"tonic reaction\"       Buspirone      Other reaction(s): *Unknown  Dizziness, drowsiness, headache  Dizziness, drowsiness, headache       Ceclor [Cephalosporins] Unknown     Compazine      Side effects     Dihydroergotamine Unknown     Other reaction(s): *Unknown - Pt Doesn't Remember  Other reaction(s): *Unknown - Pt Doesn't Remember     Fluoxetine Other (See Comments)     Other reaction(s): *Unknown  Mouth swelling  Other reaction(s): *Unknown  Facial tightness  Mouth swelling       Olanzapine Unknown, Itching and Other (See Comments)     Extrapyramidal sx, uncomfortable movements     Phenergan [Promethazine Hcl] Rash     Reglan [Metoclopramide]      Doesn't remember      Risperidone Unknown     Other reaction(s): *Unknown     Tessalon Perles [Benzonatate]      Cefaclor Rash and Unknown     Other reaction(s): *Unknown     Gabapentin Other (See Comments)     Other " "reaction(s): Headache  PN: headaches  PN: headaches       Gnp Allergy-Congestion Relief Rash     PN: rash, pt able to take loratadine alone without incident.      Penicillins Unknown and Rash     Other reaction(s): *Unknown  PN: Unknown Reaction       Propranolol Rash     Other reaction(s): *Unknown     Pseudoephedrine Rash     Rizatriptan Rash     Patient reports she has taken it since and had no reaction       Sumatriptan Rash     Other reaction(s): *Unknown  Rash and inflammation at injection site  Rash and inflammation at injection site             Condition and Physical on Discharge:      Discharge condition: Stable   Vitals: Blood pressure (!) 145/79, pulse 85, temperature 97.3  F (36.3  C), temperature source Oral, resp. rate 20, height 1.626 m (5' 4\"), weight 97.4 kg (214 lb 12.8 oz), SpO2 96 %, not currently breastfeeding.  214 lbs 12.8 oz          Temi Farmer PA-C   "

## 2020-02-09 NOTE — PLAN OF CARE
PRIMARY DIAGNOSIS: Migraine   OUTPATIENT/OBSERVATION GOALS TO BE MET BEFORE DISCHARGE:  ADLs back to baseline: Yes    Activity and level of assistance: Ambulating independently.    Pain status: improved from a 9 to a 7 after medications from ED     Return to near baseline physical activity: Yes     Discharge Planner Nurse   Safe discharge environment identified: Yes  Barriers to discharge: No       Entered by: Kiera Weber 02/08/2020 9:36 PM     Please review provider order for any additional goals.   Nurse to notify provider when observation goals have been met and patient is ready for discharge.

## 2020-02-09 NOTE — PLAN OF CARE
PRIMARY DIAGNOSIS: Migraine   OUTPATIENT/OBSERVATION GOALS TO BE MET BEFORE DISCHARGE:  ADLs back to baseline: Yes     Activity and level of assistance: Ambulating independently.     Pain status: still requiring IV toradol and IV benadryl      Return to near baseline physical activity: Yes          Discharge Planner Nurse   Safe discharge environment identified: Yes  Barriers to discharge: Yes       Entered by: Supriya Burrell 02/09/2020 10:44 AM  Please review provider order for any additional goals.   Nurse to notify provider when observation goals have been met and patient is ready for discharge.     VSS, on RA, afebrile. Up Independently. Rating headache 7/10, prn toradol and benadryl given in addition to prn excedrin. Pt also nauseous this afternoon, prn zofran given. Regular diet tolerating food, good appetite. Plan- pain control, discharge tomorrow.

## 2020-02-09 NOTE — CONSULTS
Care Transition Initial Assessment - RN        Met with: Patient.  DATA   Active Problems:    Migraine headache       Cognitive Status: awake, alert and oriented.  Primary Care Clinic Name: [facility]  Primary Care MD Name: CHATO Hall  Contact information and PCP information verified: Yes  Lives With: alone , with her animals     Quality of Family Relationships: involved  Description of Support System: Involved, Supportive   Who is your support system?: Parent(s)(Mother, Stefanie)       Insurance concerns: No Insurance issues identified  ASSESSMENT  Patient currently receives the following services: Patient known to care coordination  at [facility] and [facility].          Identified issues/concerns regarding health management: patient admitted 2/8 diagnosed with Migraine. Patient has known history of frequent admissions for Migraines, >34 ER visits in the last 6 months. Met with patient at the bedside. Patient reporting long history of migraines for the past 20 years. States she hasn't been able to get them under control. Patient reports her pain provider is currently managing her Migraines. Per chart review, patient has been followed by [facility], Dr. Nino,  [facility] and [facility]. SW at [facility] in process of developing a Unique Treatment plan.     Patient states she lives alone with her animals. She has support from her mother, Stefanie.  She receives services [facility]. She reports having a ILS (Ind ) and is working on getting a . Patient also reports being seen by her [facility] in [place]. Patient reports being connected with multiple providers. She expressed migranes, depression- difficult relationship with son, and ankles are most bothersome. Discussed following up with her providers and offered assistance. Patient  states she will see her pain specialist, as her PCP, CHATO Hall, is not managing her migraines. Patient declined support with scheduling follow up with pain, neurology or any other providers. States she plans to seek care in [place] for further Migraine management.     Patient currently followed by [facility] Care coordination, a handoff will be sent for continue support with health management.     PLAN  Financial costs for the patient include none .  Patient given options and choices for discharge yes .  Patient/family is agreeable to the plan?  Yes: home  Patient anticipates discharging to home .        Patient anticipates needs for home equipment: No  Transportation/person available to transport on day of discharge  is mother.    Plan/Disposition: Home   Appointments:     Patient states she will schedule.    Care  (CTS) will continue to follow as needed.    Eduarda Badillo RN BSN PHN CCM   Inpatient Care Coordination   [facility]   [phone]

## 2020-02-09 NOTE — PROGRESS NOTES
Patient rounded on by nurse. Nurse update patient on POC and when migraine medications were available. Patient frustrated that she would not be getting percocet or a different POC because the current one is not working. Patient informed that the MD is more than happy for us to continue the POC because patient IS getting beter (pain now a 6 out of 10 compared to a 10 out of 10 yesterday) and have patient see her pain clinic tomorrow for follow up. Patient stated that she will be leaving at 1830 tonight.

## 2020-02-09 NOTE — ED NOTES
"[facility]  ED Nurse Handoff Report    Iva Bowman is a 45 year old female   ED Chief complaint: Headache and Nausea  . ED Diagnosis:   Final diagnoses:   Other migraine with status migrainosus, intractable     Allergies:   Allergies   Allergen Reactions     Benzonatate Rash, Other (See Comments) and Unknown     Other reaction(s): *Unknown     Haloperidol      Other reaction(s): Angioedema  Other reaction(s): Angioedema     Droperidol Unknown and Rash     Other reaction(s): *Unknown - Follow up needed  Involuntary movements  Facial swelling and movements. Dystonic reaction    Other reaction(s): Muscle Aches/Weakness  PN: uncontrolled movemnts  Involuntary movements       Levothyroxine Rash     PN: Patient reports she only take Synthroid brand name  PN: Patient reports she only take Synthroid brand name       Phenothiazines Unknown, Other (See Comments) and Rash     PN: rash,   PN: uncontrolled  movements  uncontrolled  movements       Prednisone Other (See Comments) and Anxiety     suicidal  Worsening depression, PN: Makes depression worse  \"I get suicidal from this\"       Aripiprazole Other (See Comments)     PN: Dystonic  \"tonic reaction\"       Buspirone      Other reaction(s): *Unknown  Dizziness, drowsiness, headache  Dizziness, drowsiness, headache       Ceclor [Cephalosporins] Unknown     Compazine      Side effects     Dihydroergotamine Unknown     Other reaction(s): *Unknown - Pt Doesn't Remember  Other reaction(s): *Unknown - Pt Doesn't Remember     Fluoxetine Other (See Comments)     Other reaction(s): *Unknown  Mouth swelling  Other reaction(s): *Unknown  Facial tightness  Mouth swelling       Olanzapine Unknown, Itching and Other (See Comments)     Extrapyramidal sx, uncomfortable movements     Phenergan [Promethazine Hcl] Rash     Reglan [Metoclopramide]      Doesn't remember      Risperidone Unknown     Other reaction(s): *Unknown     Tessalon Perles [Benzonatate]      Cefaclor Rash " and Unknown     Other reaction(s): *Unknown     Gabapentin Other (See Comments)     Other reaction(s): Headache  PN: headaches  PN: headaches       Gnp Allergy-Congestion Relief Rash     PN: rash, pt able to take loratadine alone without incident.      Penicillins Unknown and Rash     Other reaction(s): *Unknown  PN: Unknown Reaction       Propranolol Rash     Other reaction(s): *Unknown     Pseudoephedrine Rash     Rizatriptan Rash     Patient reports she has taken it since and had no reaction       Sumatriptan Rash     Other reaction(s): *Unknown  Rash and inflammation at injection site  Rash and inflammation at injection site         Code Status: Full Code  Activity level - Baseline/Home:  Independent. Activity Level - Current:   Stand by Assist. Lift room needed: No. Bariatric: No   Needed: No   Isolation: No. Infection: Not Applicable.     Vital Signs:   Vitals:    02/08/20 1645 02/08/20 1700 02/08/20 1715 02/08/20 1800   BP: (!) 152/95 (!) 161/94 (!) 159/93    Pulse: 70 65 62 81   Resp: 16 14 14    Temp:       TempSrc:       SpO2: 98% 100% 100% 97%   Weight:       Height:           Cardiac Rhythm:  ,      Pain level: 0-10 Pain Scale: 9  Patient confused: No. Patient Falls Risk: Yes.   Elimination Status: Has voided; ambulates to restroom independently.  Patient Report - Initial Complaint: Migraine.   Focused Assessment:   Iva Bowman is a 45 year old female who presents with a migraine.  She was seen here with similar symptoms several days ago and did get relief after medication.  The headache however returned today and is even worse than usual.  Patient says she has an upcoming appointment with a new neurologist because her last neurologist gave up. She has been quite nauseated and has vomited.  She says this headache is typical for 1 of her migraines.  No fever or other new symptoms.   Tests Performed:   No orders to display     Abnormal Results: Labs Ordered and Resulted from Time of ED  Arrival Up to the Time of Departure from the ED - No data to display   Treatments provided: Warm blankets, PO food and fluids, PIV and IV fluids, analgesics, antiemetics, frequent rounds.  Family Comments:  Mom Stefanie was at bedside and has been updated on pt status and plan of care.  Mom has gone home.  OBS brochure/video discussed/provided to patient:  Yes  ED Medications:   Medications   ondansetron (ZOFRAN) injection 4 mg (4 mg Intravenous Not Given 2/8/20 1748)   sodium chloride 0.9% infusion (1,000 mLs Intravenous New Bag 2/8/20 1843)   ketorolac (TORADOL) injection 15 mg (15 mg Intravenous Given 2/8/20 1632)   diphenhydrAMINE (BENADRYL) injection 50 mg (50 mg Intravenous Given 2/8/20 1633)   dexamethasone PF (DECADRON) injection 10 mg (10 mg Intravenous Given 2/8/20 1633)   valproate (DEPACON) 500 mg in sodium chloride 0.9 % 50 mL intermittent infusion (500 mg Intravenous New Bag 2/8/20 1843)   OLANZapine (zyPREXA) injection 10 mg (10 mg Intramuscular Given 2/8/20 1748)   0.9% sodium chloride BOLUS (0 mLs Intravenous Stopped 2/8/20 1843)     Drips infusing:  No  For the majority of the shift, the patient's behavior Green.     Severe Sepsis OR Septic Shock Diagnosis Present: No      ED Nurse Name/Phone Number: Yuliya Villagran RN,   7:53 PM    RECEIVING UNIT ED HANDOFF REVIEW    Above ED Nurse Handoff Report was reviewed: Yes  Reviewed by: Kiera Weber RN on February 8, 2020 at 8:38 PM

## 2020-02-09 NOTE — PROGRESS NOTES
Patient requesting percocet, zyprexa, or Tylenol with codeine. Patient informed that MD is not willing to order any of that for here or discharge as patient already has a care plan out in town at her primary care clinic and pain clinic.  here at [facility] offered patient to help make scheduled appointment for infusion for migraine tomorrow, patient declined saying that she did not need any additional help from us, that she would do everything her self. Patient stated that her mother was coming to get her, patient given discharge papers. Awaiting ride.

## 2020-02-09 NOTE — PLAN OF CARE
"PRIMARY DIAGNOSIS: Migraine   OUTPATIENT/OBSERVATION GOALS TO BE MET BEFORE DISCHARGE:  ADLs back to baseline: Yes    Activity and level of assistance: Ambulating independently.    Pain status: still requiring IV toradol and IV benadryl     Return to near baseline physical activity: Yes     Discharge Planner Nurse   Safe discharge environment identified: Yes  Barriers to discharge: Yes       Entered by: Supriya Burrell 02/09/2020 10:44 AM     Please review provider order for any additional goals.   Nurse to notify provider when observation goals have been met and patient is ready for discharge.    BP elevated, has not yet had her bp meds this am, on RA, afebrile. Up Independently. Rating headache 7/10, prn toradol and benadryl given. Pt states, \"IV stuff works better for me.\" Regular diet. Fluids running at 125mL/hr. Pt requesting protonix and zyprexa ordered- provider notified. Plan- pain control.   "

## 2020-02-09 NOTE — PLAN OF CARE
PRIMARY DIAGNOSIS: Migraine  OUTPATIENT/OBSERVATION GOALS TO BE MET BEFORE DISCHARGE:  1. ADLs back to baseline: Yes    2. Activity and level of assistance: Ambulating independently.    3. Pain status: Still 7/10    4. Return to near baseline physical activity: Yes     Discharge Planner Nurse   Safe discharge environment identified: Yes  Barriers to discharge: Yes       Entered by: Gabriel Ramirez 02/09/2020 2:01 AM     Vitals are Temp: 97.7  F (36.5  C) Temp src: Oral BP: (!) 150/76 Pulse: 81 Heart Rate: 68 Resp: 18 SpO2: 98 %.     Patient is AxOx4. Up independently. On a regular diet. Complaining of 7/10 migraine headache, sharp and throbbing. PRN Excedrin given. Reports mild nausea but declined antiemetic. LR bolus completed. PIV now infusing 0.9 NS + KCL 20 meq/L at 125 mL/hr. Denies dizziness and SOB. LS clear. BS active. Last BM 2/8/2020. Voiding adequately. Stable and resting in bed. Will continue to monitor and provide supportive cares. Care coordinator consulted.       Please review provider order for any additional goals.   Nurse to notify provider when observation goals have been met and patient is ready for discharge.

## 2020-02-09 NOTE — H&P
"[facility]  History and Physical  Hospitalist       Date of Admission:  2/8/2020    Assessment & Plan   Iva Bowman is a 45 year old female with hypothyroidism, seizure disorder, hypertension, anxiety, depression, asthma, and frequent migraines who presented to [facility] on 2/8/2020 with migraine.  Pain still 7/10 after multiple medications in the ED; admission for Overnight Observation requested.       Migraine headache  34 ER visits for migraine since August.  Intolerant of triptans, ergot, and phenothiazines, as well as antipsychotics like Haldol and droperidol.  Previously had plan in place for migraines (Neurologist would have her get outpatient \"migraine\" cocktail at [facility] center) but this is no longer an option as her Neurologist \"gave up\".  No focal weakness or neuro deficits to suggest seizure, complex migraine, or acute neuro pathology.       --Will get BMP to ensure no electrolyte derangement driving headache  --Retrial IV fluid bolus, Zyprexa (allergy noted, has tolerated it in the past), benadryl, and Zofran.  Monitor for dystonia with Zyprexa.   --PRN toradol, Excedrin migraine.    --Needs to establish with new Neurologist.  --Needs updated ED Care Plan (RN CC consult ordered)     Hypothyroidism.  Continue name brand Synthroid.   Seizure disorder.  Continue Keppra.   Hypertension.  Continue lisinopril.    Asthma.  Continue Flovent and PRN inhaler.   Anxiety/Depression. Continue Trintellix.     DVT Prophylaxis: Low Risk/Ambulatory with no VTE prophylaxis indicated  Code Status: Full Code    Disposition:   Home tomorrow AM if migraine better.    [facility]    PRIMARY CARE PROVIDER: Theresa Hall    CHIEF COMPLAINT  Migraine    History is obtained from the patient    HISTORY OF PRESENT ILLNESS  Iva Bowman is a 45 year old female with hypothyroidism, seizure disorder, hypertension, anxiety, depression, asthma, and frequent migraines who presented to [facility] on 2/8/2020 with " "migraine.  Pain started this AM.  No prodromal symptoms although she did have some perioral tingling yesterday which resolved.  She tried Tylenol without relief.  She states she has no other medications at home for migraine.      In the ER, /105 with HR 88, RR 20, SpO2 97% on RA, temp 98.4.  No labs were performed today, but labs for ER visit on 1/30 revealed CMP notable for potassium 3.0, preserved renal function, and normal LFTs.  CBC within normal limits.  In the ED, patient received 50 mg IV benadryl, 10 mg IM zyprexa, 10 mg IV decadron, and 500 mg valproate without improvement.  She then received a liter of fluids.  Pain improved from 10/10 only to 7/10.  Because of ongoing pain, admission to Overnight Observation requested.    Patient seen on the floor where at present time she is resting comfortably.  She is concerned about joel Corona virus and so is wearing a face mask.  She is intolerant of triptans, ergot, and phenothiazines, as well as antipsychotics like Haldol and droperidol.  She has had 34 ED visits for migraine headaches since August.  She has tried medication management as well as Botox.  Has an ED Care Plan in place for migraines where she is to contact her Neurologist who sets her up for an outpatient \"migraine\" cocktail at [facility].  She states \"my third Neurologist gave up on me\" and reports she \"might just go back to the pain clinic.\"  She has also considered going to [place] but is worried about leaving her cat and dog behind.  ED Care Plan also outlines that patient is not to be prescribed narcotics at discharge.     PAST MEDICAL HISTORY  I have reviewed this patient's medical history and updated it with pertinent information if needed.   Past Medical History:   Diagnosis Date     Anemia      Anxiety      Arrhythmia s/p ablation 2018    ablation for \"racing heartbeat\"     Depressive disorder      Fibromyalgia      Gastro-oesophageal reflux disease      Herpes simplex " 05/19/2006     Hypertension      Hypothyroidism. Requires name brand Synthroid      IBS (irritable bowel syndrome)      Migraine     Common migraine.        Seizure disorder     last seizure 2011 - petit mal       PAST SURGICAL HISTORY  I have reviewed this patient's surgical history and updated it with pertinent information if needed.  Past Surgical History:   Procedure Laterality Date     ARTHROSCOPY ANKLE Right 2007     Cardiac ablation       CHOLECYSTECTOMY, LAPOROSCOPIC  02/2011     CRYOTHERAPY, CERVICAL       DILATION AND CURETTAGE, OPERATIVE HYSTEROSCOPY WITH MORCELLATOR, COMBINED N/A 8/20/2019    Procedure: hysteroscopy, dilation and curettage, polypectomy with myosure;  Surgeon: Yuliya Mendez DO;  Location: [facility] OR     FOOT SURGERY  2008     Heel ulcer treatement  2007     PE TUBES       Retrocalcaneal bursitis and Sarah deformity treatment  2006     TONSILLECTOMY         PRIOR TO ADMISSION MEDICATIONS  Prior to Admission Medications   Prescriptions Last Dose Informant Patient Reported? Taking?   MELATONIN PO   Yes No   Sig: Take 10 mg by mouth nightly as needed (sleep)    acetaminophen (TYLENOL) 500 MG tablet   Yes No   Sig: Take 1,000 mg by mouth every 6 hours as needed for pain   amLODIPine (NORVASC) 5 MG tablet   Yes No   Sig: Take 5 mg by mouth   aspirin (ASA) 81 MG tablet   Yes No   Sig: Take 81 mg by mouth daily    aspirin-acetaminophen-caffeine (EXCEDRIN MIGRAINE) 250-250-65 MG tablet   Yes No   Sig: Take 2 tablets by mouth every 4 hours as needed for headaches (Migraine)   azelastine (ASTELIN) 0.1 % nasal spray   Yes No   Sig: Spray 1 spray into both nostrils daily    cetirizine (ZYRTEC) 10 MG tablet   Yes No   Sig: Take 10 mg by mouth daily   cholecalciferol ( ULTRA STRENGTH) 2000 units CAPS   Yes No   Sig: Take 2,000 Units by mouth   fluticasone (FLONASE) 50 MCG/ACT nasal spray  Self No No   Sig: Spray 1-2 sprays into both nostrils daily as needed.   ibuprofen (ADVIL/MOTRIN) 200 MG  "tablet   Yes No   Sig: Take 600 mg by mouth every 6 hours as needed for mild pain or fever    levETIRAcetam (KEPPRA) 500 MG tablet   No No   Sig: Take 2 tablets (1,000 mg) by mouth 2 times daily   levothyroxine (SYNTHROID) 50 MCG tablet  Self No No   Sig: Take 1 tablet by mouth daily.   multivitamin, therapeutic with minerals (MULTI-VITAMIN) TABS  Self Yes No   Sig: Take 1 tablet by mouth daily   naproxen (NAPROSYN) 500 MG tablet   Yes No   Sig: Take 500 mg by mouth 2 times daily as needed for moderate pain Take with food   ofloxacin (OCUFLOX) 0.3 % ophthalmic solution   Yes No   Sig: Place 1-2 drops into both eyes daily as needed (Dry eyes)   pantoprazole (PROTONIX) 40 MG EC tablet   Yes No   Sig: Take 40 mg by mouth daily   polyethylene glycol (MIRALAX/GLYCOLAX) packet   Yes No   Sig: Take 1 packet by mouth daily   ranitidine (ZANTAC) 150 MG capsule   Yes No   Sig: Take 150 mg by mouth daily   vortioxetine (TRINTELLIX) 20 MG tablet   Yes No   Sig: Take 20 mg by mouth daily      Facility-Administered Medications: None       ALLERGIES  Allergies   Allergen Reactions     Benzonatate Rash, Other (See Comments) and Unknown     Other reaction(s): *Unknown     Haloperidol      Other reaction(s): Angioedema  Other reaction(s): Angioedema     Droperidol Unknown and Rash     Other reaction(s): *Unknown - Follow up needed  Involuntary movements  Facial swelling and movements. Dystonic reaction    Other reaction(s): Muscle Aches/Weakness  PN: uncontrolled movemnts  Involuntary movements       Levothyroxine Rash     PN: Patient reports she only take Synthroid brand name  PN: Patient reports she only take Synthroid brand name       Phenothiazines Unknown, Other (See Comments) and Rash     PN: rash,   PN: uncontrolled  movements  uncontrolled  movements       Prednisone Other (See Comments) and Anxiety     suicidal  Worsening depression, PN: Makes depression worse  \"I get suicidal from this\"       Aripiprazole Other (See " "Comments)     PN: Dystonic  \"tonic reaction\"       Buspirone      Other reaction(s): *Unknown  Dizziness, drowsiness, headache  Dizziness, drowsiness, headache       Ceclor [Cephalosporins] Unknown     Compazine      Side effects     Dihydroergotamine Unknown     Other reaction(s): *Unknown - Pt Doesn't Remember  Other reaction(s): *Unknown - Pt Doesn't Remember     Fluoxetine Other (See Comments)     Other reaction(s): *Unknown  Mouth swelling  Other reaction(s): *Unknown  Facial tightness  Mouth swelling       Olanzapine Unknown, Itching and Other (See Comments)     Extrapyramidal sx, uncomfortable movements     Phenergan [Promethazine Hcl] Rash     Reglan [Metoclopramide]      Doesn't remember      Risperidone Unknown     Other reaction(s): *Unknown     Tessalon Perles [Benzonatate]      Cefaclor Rash and Unknown     Other reaction(s): *Unknown     Gabapentin Other (See Comments)     Other reaction(s): Headache  PN: headaches  PN: headaches       Gnp Allergy-Congestion Relief Rash     PN: rash, pt able to take loratadine alone without incident.      Penicillins Unknown and Rash     Other reaction(s): *Unknown  PN: Unknown Reaction       Propranolol Rash     Other reaction(s): *Unknown     Pseudoephedrine Rash     Rizatriptan Rash     Patient reports she has taken it since and had no reaction       Sumatriptan Rash     Other reaction(s): *Unknown  Rash and inflammation at injection site  Rash and inflammation at injection site         SOCIAL HISTORY  Single.  One son who does not live locally.  Mother lives close by.  Patient has a cat and dog.  She does not work because of migraines.  She does not smoke cigarettes or marijuana.  She rarely drinks alcohol.  She is normally independent of ADLs.     FAMILY HISTORY  I have reviewed this patient's family history and updated it with pertinent information if needed.   Family History   Problem Relation Age of Onset     Musculoskeletal Disorder Mother         Sjogrens  "     Obesity Mother      Depression Mother      Lipids Father      Heart Disease Maternal Grandmother      Obesity Maternal Grandmother      Osteoporosis Maternal Grandmother      Eye Disorder Maternal Grandmother         Cornea surgery     Cancer Maternal Grandfather         Pancreatic     Heart Disease Paternal Grandmother      Lipids Paternal Grandmother      Heart Disease Paternal Grandfather      Neurologic Disorder Paternal Grandfather      Bipolar Disorder Son      No family history of migraine    REVIEW OF SYSTEMS:  14 point comprehensive ROS undertaken with pertinent positives and negatives as above and otherwise unremarkable.     PHYSICAL EXAM  Temp: 97.9  F (36.6  C) Temp src: Oral BP: (!) 158/80 Pulse: 81 Heart Rate: 84 Resp: 16 SpO2: 98 % O2 Device: None (Room air)    Vital Signs with Ranges  Temp:  [97.9  F (36.6  C)-98.4  F (36.9  C)] 97.9  F (36.6  C)  Pulse:  [62-81] 81  Heart Rate:  [84-88] 84  Resp:  [14-20] 16  BP: (143-161)/() 158/80  SpO2:  [96 %-100 %] 98 %  214 lbs 12.8 oz    GENERAL:  Pleasant, cooperative, alert. Well developed, well nourished.    HEENT: Normocephalic, atraumatic.  Extra occular mm intact.  Sclera clear. PERRL.  Mucous membranes moist.     PULMONARY: Clear to auscultation bilaterally    CARDIAC: Regular rate and rhythm.    ABDOMEN: Soft, nontender non distended. No hepatosplenomegaly.  MUSCULOSKELETAL:  Moving x 4 spontaneously with CMS intact x4.  Normal bulk and tone.  No LE edema.  Left ankle in air cast.   NEURO: Alert and oriented x3.  CN II-XII grossly intact and symmetric.  Nonfocal exam.  SKIN:  Warm, pink, dry.  PSYCH:  Appropriate    DATA  Data reviewed today:  I personally reviewed no images or EKG's today.    No lab results found in last 7 days.    No results found for this or any previous visit (from the past 24 hour(s)).

## 2020-02-09 NOTE — PLAN OF CARE
ROOM # 229    Living Situation (if not independent, order SW consult):home with pets (cat and dog)  Facility name:  : mom     Activity level at baseline: IND  Activity level on admit: IND      Patient registered to observation; given Patient Bill of Rights; given the opportunity to ask questions about observation status and their plan of care.  Patient has been oriented to the observation room, bathroom and call light is in place.    Discussed discharge goals and expectations with patient/family.

## 2020-02-09 NOTE — PROGRESS NOTES
"[facility]  Hospitalist Progress Note  Temi Farmer PA-C 02/09/2020    Reason for Stay (Diagnosis): migraine         Assessment and Plan:      Summary of Stay: Iva Bowman is a 45 year old female with hypothyroidism, seizure disorder, hypertension, anxiety, depression, asthma, and frequent migraines, who was admitted on 2/8/2020 with persistent migraine.      Problem List:   1. Migraine headache - 34 ER visits for migraine since August.  Intolerant of triptans, ergot, and phenothiazines, as well as antipsychotics like Haldol and droperidol. Pt states she had an infusion plan at an oncology clinic in [place] for acute management of migraines (Toradol, Benadryl and Zyprexa), not available on the weekend. Was seeing a Neurologist, but no longer, is planning to go back to a pain clinic for Botox. Pt denies improvement in her migraine, will continue Toradol and benadryl, will give Zyprexa again today. If unable to discharge today, consider Neurology consult tomorrow.  2. Hypothyroidism - continue home med  3. Seizure disorder - continue home meds  4. HTN - continue home meds  5. Asthma - continue home meds  6. Anxiety/depression - continue home meds    DVT Prophylaxis: Ambulate every shift  Code Status: Full Code  Discharge Dispo: home  Estimated Disch Date / # of Days until Disch: later today vs tomorrow        Interval History (Subjective):      Continues to complain of headache, 7-9/10. Tolerating a regular diet.                   Physical Exam:      Last Vital Signs:  BP (!) 150/94 (BP Location: Left arm)   Pulse 85   Temp 96.7  F (35.9  C) (Oral)   Resp 20   Ht 1.626 m (5' 4\")   Wt 97.4 kg (214 lb 12.8 oz)   SpO2 95%   BMI 36.87 kg/m        Intake/Output Summary (Last 24 hours) at 2/9/2020 1332  Last data filed at 2/9/2020 0012  Gross per 24 hour   Intake 1000 ml   Output --   Net 1000 ml       Constitutional: Awake, alert, cooperative, no apparent distress   Respiratory: Normal " respiratory effort   Cardiovascular: Regular rate and rhythm   Abdomen:    Skin: No rashes, no cyanosis, dry to touch   Neuro: Alert and oriented x3   Extremities: No edema, normal range of motion   Other(s):        All other systems: Negative          Medications:      All current medications were reviewed with changes reflected in problem list.         Data:      All new lab and imaging data was reviewed.   Labs:    Recent Labs   Lab 02/08/20  2256      POTASSIUM 3.8   CHLORIDE 112*   CO2 21   ANIONGAP 7   *   BUN 8   CR 0.74   GFRESTIMATED >90   GFRESTBLACK >90   CRYSTAL 8.3*     Imaging:   No results found for this or any previous visit (from the past 48 hour(s)).    Temi Farmer PA-C

## 2020-02-09 NOTE — PLAN OF CARE
PRIMARY DIAGNOSIS: Migraine  OUTPATIENT/OBSERVATION GOALS TO BE MET BEFORE DISCHARGE:  1. ADLs back to baseline: Yes    2. Activity and level of assistance: Ambulating independently.    3. Pain status: 7/10, unchanged    4. Return to near baseline physical activity: Yes     Discharge Planner Nurse   Safe discharge environment identified: Yes  Barriers to discharge: Yes       Entered by: Gabriel Ramirez 02/09/2020 5:41 AM     Vitals are Temp: 98.2  F (36.8  C) Temp src: Oral BP: 130/74 Pulse: 81 Heart Rate: 65 Resp: 18 SpO2: 95 %.     Patient is AxOx4. Up independently. On a regular diet. Toradol and benadryl for migraine. PIV now infusing 0.9 NS + KCL 20 meq/L at 125 mL/hr. Denies dizziness and SOB. LS clear. BS active. Last BM 2/8/2020. Voiding adequately. Melatonin for sleep. Stable and resting in bed. Will continue to monitor and provide supportive cares. Care coordinator consulted.       Please review provider order for any additional goals.   Nurse to notify provider when observation goals have been met and patient is ready for discharge.

## 2020-02-10 NOTE — PLAN OF CARE
Patient's After Visit Summary was reviewed with patient.  Patient verbalized understanding of After Visit Summary, recommended follow up and was given an opportunity to ask questions.   Discharge medications sent home with patient/family: No, patient has plan to follow up with her clinic for infusion for migraines   Discharged with mother

## 2020-02-15 ENCOUNTER — HOSPITAL ENCOUNTER (EMERGENCY)
Facility: CLINIC | Age: 45
Discharge: HOME | End: 2020-02-15
Attending: PHYSICIAN ASSISTANT | Admitting: PHYSICIAN ASSISTANT
Payer: MEDICARE

## 2020-02-15 VITALS
HEART RATE: 87 BPM | SYSTOLIC BLOOD PRESSURE: 137 MMHG | RESPIRATION RATE: 17 BRPM | DIASTOLIC BLOOD PRESSURE: 83 MMHG | OXYGEN SATURATION: 99 % | TEMPERATURE: 98.4 F

## 2020-02-15 DIAGNOSIS — R51.9 ACUTE NONINTRACTABLE HEADACHE, UNSPECIFIED HEADACHE TYPE: ICD-10-CM

## 2020-02-15 PROCEDURE — 25800030 ZZH RX IP 258 OP 636: Performed by: PHYSICIAN ASSISTANT

## 2020-02-15 PROCEDURE — 96375 TX/PRO/DX INJ NEW DRUG ADDON: CPT

## 2020-02-15 PROCEDURE — 99284 EMERGENCY DEPT VISIT MOD MDM: CPT | Mod: 25

## 2020-02-15 PROCEDURE — 96361 HYDRATE IV INFUSION ADD-ON: CPT

## 2020-02-15 PROCEDURE — 96372 THER/PROPH/DIAG INJ SC/IM: CPT | Mod: 59

## 2020-02-15 PROCEDURE — 25000128 H RX IP 250 OP 636: Performed by: PHYSICIAN ASSISTANT

## 2020-02-15 PROCEDURE — 96374 THER/PROPH/DIAG INJ IV PUSH: CPT

## 2020-02-15 RX ORDER — KETOROLAC TROMETHAMINE 30 MG/ML
30 INJECTION, SOLUTION INTRAMUSCULAR; INTRAVENOUS ONCE
Status: COMPLETED | OUTPATIENT
Start: 2020-02-15 | End: 2020-02-15

## 2020-02-15 RX ORDER — DIPHENHYDRAMINE HYDROCHLORIDE 50 MG/ML
25 INJECTION INTRAMUSCULAR; INTRAVENOUS ONCE
Status: COMPLETED | OUTPATIENT
Start: 2020-02-15 | End: 2020-02-15

## 2020-02-15 RX ORDER — OLANZAPINE 10 MG/2ML
5 INJECTION, POWDER, FOR SOLUTION INTRAMUSCULAR ONCE
Status: COMPLETED | OUTPATIENT
Start: 2020-02-15 | End: 2020-02-15

## 2020-02-15 RX ADMIN — SODIUM CHLORIDE 1000 ML: 9 INJECTION, SOLUTION INTRAVENOUS at 15:33

## 2020-02-15 RX ADMIN — OLANZAPINE 5 MG: 10 INJECTION, POWDER, FOR SOLUTION INTRAMUSCULAR at 15:33

## 2020-02-15 RX ADMIN — DIPHENHYDRAMINE HYDROCHLORIDE 25 MG: 50 INJECTION, SOLUTION INTRAMUSCULAR; INTRAVENOUS at 15:32

## 2020-02-15 RX ADMIN — KETOROLAC TROMETHAMINE 30 MG: 30 INJECTION, SOLUTION INTRAMUSCULAR at 15:32

## 2020-02-15 ASSESSMENT — ENCOUNTER SYMPTOMS
ABDOMINAL PAIN: 1
NAUSEA: 1
NUMBNESS: 1
PHOTOPHOBIA: 1
FEVER: 0
HEADACHES: 1

## 2020-02-15 NOTE — ED AVS SNAPSHOT
[facility] Emergency Department  [address]  [facility] [address]  Phone:  [phone]  Fax:  [phone]                                    Iva Bowman   MRN: [id number]    Department:  [facility] Emergency Department   Date of Visit:  2/15/2020           After Visit Summary Signature Page    I have received my discharge instructions, and my questions have been answered. I have discussed any challenges I see with this plan with the nurse or doctor.    ..........................................................................................................................................  Patient/Patient Representative Signature      ..........................................................................................................................................  Patient Representative Print Name and Relationship to Patient    ..................................................               ................................................  Date                                   Time    ..........................................................................................................................................  Reviewed by Signature/Title    ...................................................              ..............................................  Date                                               Time          22EPIC Rev 08/18

## 2020-02-15 NOTE — ED PROVIDER NOTES
History     Chief Complaint:  Headache    HPI   Iva Bowman is a 45 year old female with a history of migraines who presents with a headache. Yesterday she got a headache with associated nausea on the front and top of her head. She also endorses neck pain, bilateral cheek numbness, abdominal pain, and photophobia. She has tried Botox injections for the pain, the last one being three months ago. She has tried sumatriptan and Tylenol at home with very little relief. She is currently seeing a pain clinic who haven't been successful at reducing the pain. She is no longer seeing a neurologist. She denies any other new symptoms and fever. She notes Zyprexa normally helps her migraines.    Allergies:  Benzonatate  Haloperidol  Lorazepam  Droperidol  Levothyroxine  Phenothiazines  Prednisone  Aripiprazole  Buspirone  Ceclor  Compazine  Dihydroergotamine  Fluoxetine  Olanzapine  Phenergan  Reglan  Risperidone  Tessalon Perles  Cefaclor  Gabapentin  Penicillins  Propranolol  Pseudoephedrine  Rizatriptan  Sumatriptan    Medications:    Excedrin migraine  Emgality  Keppra  Synthroid  Lisinopril  Provera  Naproxen  Zofran  Protonix  Vortioxetine    Past Medical History:    Migraines  Anemia  Anxiety  Arrhythmia  Depressive disorder  Fibromyalgia  GERD  Herpes simplex  Hypertension  Hypothyroidism  IBS  Seizures  Supraventricular tachycardia  PTSD  Seasonal allergic rhinitis  Drug overdose  Borderline personality disorder in adult  Depression    Past Surgical History:    Right arthroplasty ankle  Cardiac ablation  Cholecystectomy  Cervical, cryotherapy  D&C, operative hysteroscopy with morcellator, combined  Foot surgery  Heel ulcer treatment  PE Tubes  Retrocalcaneal bursitis and Sarah deformity treatment  Tonsillectomy    Family History:    Mother: Sjogrens, obesity, depression  Father: Lipids  Son: Bipolar disorder    Social History:  The patient was accompanied to the ED by a relative  Smoking Status: Former Smoker  (Quit 8/5/1998)  Smokeless Tobacco: Never Used  Alcohol Use: Positive, 5x/year  Drug Use: Negative  PCP: Theresa Hall  Marital Status:   [4]    Review of Systems   Constitutional: Negative for fever.   Eyes: Positive for photophobia.   Gastrointestinal: Positive for abdominal pain and nausea.   Neurological: Positive for numbness (cheeks, bilateral) and headaches.   All other systems reviewed and are negative.       Physical Exam     Patient Vitals for the past 24 hrs:   BP Temp Temp src Pulse Heart Rate Resp SpO2   02/15/20 1630 -- -- -- -- 84 17 --   02/15/20 1615 137/83 -- -- 87 82 16 99 %   02/15/20 1600 (!) 134/95 -- -- 98 100 16 97 %   02/15/20 1440 (!) 161/144 98.4  F (36.9  C) Oral -- 133 20 97 %       Physical Exam  Constitutional: well appearing, no acute distress.   Head: No external signs of trauma noted to head or face.   Eyes: Pupils are equal, round, and reactive to light. Conjunctiva normal. EOMI.  ENT: Oropharynx clear and moist. Normal voice.   Neck: normal ROM.     Cardiovascular: Normal rate, regular rhythm, and intact distal pulses.    Respiratory: Effort normal. No respiratory distress. Lungs clear to auscultation bilaterally.   GI: Soft. Non-tender.  Musculoskeletal: No deformities appreciated. Normal ROM. No edema noted.  Neurological:    Patient is alert and oriented to person, place, and time.    Speech is fluent, cognition is normal.   CN 2-12 intact (PERRL, EOMI, symmetric smile, equal eye squeeze and forehead raise, normal and equal sensation to bilateral forehead/cheek/chin, equal hearing to finger rub, midline tongue protrusion with nl side-to-side movement, normal shoulder shrug).    RUE strength 5/5: , finger abd, wrist flex/ext, elbow flex/ext.    LUE strength 5/5: , finger abd, wrist flex/ext, elbow flex/ext.    RLE strength 5/5: ankle flex/ext, knee flex/ext, hip flex.    LLE strength 5/5: ankle flex/ext, knee flex/ext, hip flex.    Sensation equal in all 4  extremities.    No arm drift.     Cerebellar: Normal rapid alternating movements (finger-nose-finger, rapid pronation/supination)   Normal gait.   Psychiatric: Appropriate mood, affect, and behavior.   Skin: Skin is warm and dry. no rash.            Emergency Department Course     Interventions:  1532: Toradol, 30 mg, IV  1532: Benadryl, 25 mg, IV  1533: Zyprexa, 5 mg, IM  1533: Normal Saline, 1 liter, IV bolus     Emergency Department Course:    1452 Nursing notes and vitals reviewed.    1513 I performed an exam of the patient as documented above.     1621 Patient rechecked and updated.     1632 Findings and plan explained to the patient. Patient discharged home with instructions regarding supportive care, medications, and reasons to return. The importance of close follow-up was reviewed.    Impression & Plan      Medical Decision Making:  Iva Bowman is a 45 year old female who presents with a persistent headache.  She does have a history of migraines and reports this is typical of her headaches. She has had several ED visits recently for the same type of headache. She has a completely normal neurologic exam here. The patient has not had any fever, weakness, numbness, paresthesia, neck stiffness, confusion, or other concerning red flags today. Meningitis, subarachnoid hemorrhage, CNS tumor, and stroke are considered as part of the differential, and considered unlikely. No indication for imaging or LP at this time. The pain has improved with medication interventions and patient is now requesting discharge. The patient should follow-up with her primary physician within 2-3 days. I recommended she follow-up with neurology, but she refuses to be seen by neurology anymore and therefore I recommended follow-up with PCP.  Return if increasing pain, fever, vomiting or weakness, or any other concerns.        Diagnosis:    ICD-10-CM    1. Acute nonintractable headache, unspecified headache type R51      Disposition:    Patient was discharged home.    Scribe Disclosure:  I, Kiran Joni, am serving as a scribe at 3:13 PM on 2/15/2020 to document services personally performed by Fang Dorman PA-C based on my observations and the provider's statements to me.    [facility] EMERGENCY DEPARTMENT       Fang Dorman PA-C  02/15/20 [id number]

## 2020-02-15 NOTE — ED TRIAGE NOTES
A&Ox 4, ABCs intact. Pt presents with migraine and nausea since yesterday. Pt states she took sumatriptan and tylenol yesterday with little relief.

## 2020-02-21 ENCOUNTER — APPOINTMENT (OUTPATIENT)
Dept: GENERAL RADIOLOGY | Facility: CLINIC | Age: 45
End: 2020-02-21
Attending: EMERGENCY MEDICINE
Payer: MEDICARE

## 2020-02-21 ENCOUNTER — HOSPITAL ENCOUNTER (EMERGENCY)
Facility: CLINIC | Age: 45
Discharge: HOME | End: 2020-02-21
Attending: EMERGENCY MEDICINE | Admitting: EMERGENCY MEDICINE
Payer: MEDICARE

## 2020-02-21 VITALS
TEMPERATURE: 99.2 F | RESPIRATION RATE: 18 BRPM | DIASTOLIC BLOOD PRESSURE: 93 MMHG | SYSTOLIC BLOOD PRESSURE: 143 MMHG | HEART RATE: 66 BPM | OXYGEN SATURATION: 98 %

## 2020-02-21 DIAGNOSIS — J11.1 INFLUENZA-LIKE ILLNESS: ICD-10-CM

## 2020-02-21 DIAGNOSIS — J20.9 ACUTE BRONCHITIS, UNSPECIFIED ORGANISM: ICD-10-CM

## 2020-02-21 LAB
ALBUMIN UR-MCNC: NEGATIVE MG/DL
ANION GAP SERPL CALCULATED.3IONS-SCNC: 4 MMOL/L (ref 3–14)
APPEARANCE UR: CLEAR
BACTERIA #/AREA URNS HPF: ABNORMAL /HPF
BASOPHILS # BLD AUTO: 0.1 10E9/L (ref 0–0.2)
BASOPHILS NFR BLD AUTO: 0.5 %
BILIRUB UR QL STRIP: NEGATIVE
BUN SERPL-MCNC: 13 MG/DL (ref 7–30)
CALCIUM SERPL-MCNC: 8.9 MG/DL (ref 8.5–10.1)
CHLORIDE SERPL-SCNC: 110 MMOL/L (ref 94–109)
CK SERPL-CCNC: 86 U/L (ref 30–225)
CO2 SERPL-SCNC: 26 MMOL/L (ref 20–32)
COLOR UR AUTO: YELLOW
CREAT SERPL-MCNC: 0.69 MG/DL (ref 0.52–1.04)
DEPRECATED S PYO AG THROAT QL EIA: NEGATIVE
DIFFERENTIAL METHOD BLD: ABNORMAL
EOSINOPHIL # BLD AUTO: 0.1 10E9/L (ref 0–0.7)
EOSINOPHIL NFR BLD AUTO: 1 %
ERYTHROCYTE [DISTWIDTH] IN BLOOD BY AUTOMATED COUNT: 11.4 % (ref 10–15)
FLUAV+FLUBV AG SPEC QL: NEGATIVE
FLUAV+FLUBV AG SPEC QL: NEGATIVE
GFR SERPL CREATININE-BSD FRML MDRD: >90 ML/MIN/{1.73_M2}
GLUCOSE SERPL-MCNC: 111 MG/DL (ref 70–99)
GLUCOSE UR STRIP-MCNC: NEGATIVE MG/DL
HCG SERPL QL: NEGATIVE
HCT VFR BLD AUTO: 45.4 % (ref 35–47)
HGB BLD-MCNC: 15.2 G/DL (ref 11.7–15.7)
HGB UR QL STRIP: NEGATIVE
IMM GRANULOCYTES # BLD: 0.1 10E9/L (ref 0–0.4)
IMM GRANULOCYTES NFR BLD: 0.5 %
KETONES UR STRIP-MCNC: NEGATIVE MG/DL
LEUKOCYTE ESTERASE UR QL STRIP: NEGATIVE
LYMPHOCYTES # BLD AUTO: 1.5 10E9/L (ref 0.8–5.3)
LYMPHOCYTES NFR BLD AUTO: 11.5 %
MCH RBC QN AUTO: 30.3 PG (ref 26.5–33)
MCHC RBC AUTO-ENTMCNC: 33.5 G/DL (ref 31.5–36.5)
MCV RBC AUTO: 90 FL (ref 78–100)
MONOCYTES # BLD AUTO: 1.2 10E9/L (ref 0–1.3)
MONOCYTES NFR BLD AUTO: 8.8 %
MUCOUS THREADS #/AREA URNS LPF: PRESENT /LPF
NEUTROPHILS # BLD AUTO: 10.2 10E9/L (ref 1.6–8.3)
NEUTROPHILS NFR BLD AUTO: 77.7 %
NITRATE UR QL: NEGATIVE
NRBC # BLD AUTO: 0 10*3/UL
NRBC BLD AUTO-RTO: 0 /100
PH UR STRIP: 5.5 PH (ref 5–7)
PLATELET # BLD AUTO: 254 10E9/L (ref 150–450)
POTASSIUM SERPL-SCNC: 3.8 MMOL/L (ref 3.4–5.3)
RBC # BLD AUTO: 5.02 10E12/L (ref 3.8–5.2)
RBC #/AREA URNS AUTO: 2 /HPF (ref 0–2)
SODIUM SERPL-SCNC: 140 MMOL/L (ref 133–144)
SOURCE: ABNORMAL
SP GR UR STRIP: 1.01 (ref 1–1.03)
SPECIMEN SOURCE: NORMAL
SQUAMOUS #/AREA URNS AUTO: 2 /HPF (ref 0–1)
STREP GROUP A PCR: NOT DETECTED
UROBILINOGEN UR STRIP-MCNC: NORMAL MG/DL (ref 0–2)
WBC # BLD AUTO: 13.2 10E9/L (ref 4–11)
WBC #/AREA URNS AUTO: 1 /HPF (ref 0–5)

## 2020-02-21 PROCEDURE — 85025 COMPLETE CBC W/AUTO DIFF WBC: CPT | Performed by: EMERGENCY MEDICINE

## 2020-02-21 PROCEDURE — 96361 HYDRATE IV INFUSION ADD-ON: CPT

## 2020-02-21 PROCEDURE — 94640 AIRWAY INHALATION TREATMENT: CPT

## 2020-02-21 PROCEDURE — 87804 INFLUENZA ASSAY W/OPTIC: CPT | Performed by: EMERGENCY MEDICINE

## 2020-02-21 PROCEDURE — 96374 THER/PROPH/DIAG INJ IV PUSH: CPT

## 2020-02-21 PROCEDURE — 84703 CHORIONIC GONADOTROPIN ASSAY: CPT | Performed by: EMERGENCY MEDICINE

## 2020-02-21 PROCEDURE — 96375 TX/PRO/DX INJ NEW DRUG ADDON: CPT

## 2020-02-21 PROCEDURE — 25000125 ZZHC RX 250: Performed by: EMERGENCY MEDICINE

## 2020-02-21 PROCEDURE — 87651 STREP A DNA AMP PROBE: CPT | Performed by: EMERGENCY MEDICINE

## 2020-02-21 PROCEDURE — 40001204 ZZHCL STATISTIC STREP A RAPID: Performed by: EMERGENCY MEDICINE

## 2020-02-21 PROCEDURE — 99284 EMERGENCY DEPT VISIT MOD MDM: CPT | Mod: 25

## 2020-02-21 PROCEDURE — 82550 ASSAY OF CK (CPK): CPT | Performed by: EMERGENCY MEDICINE

## 2020-02-21 PROCEDURE — 25000128 H RX IP 250 OP 636: Performed by: EMERGENCY MEDICINE

## 2020-02-21 PROCEDURE — 71046 X-RAY EXAM CHEST 2 VIEWS: CPT

## 2020-02-21 PROCEDURE — 80048 BASIC METABOLIC PNL TOTAL CA: CPT | Performed by: EMERGENCY MEDICINE

## 2020-02-21 PROCEDURE — 81001 URINALYSIS AUTO W/SCOPE: CPT | Performed by: EMERGENCY MEDICINE

## 2020-02-21 PROCEDURE — 25800030 ZZH RX IP 258 OP 636: Performed by: EMERGENCY MEDICINE

## 2020-02-21 RX ORDER — ONDANSETRON 2 MG/ML
4 INJECTION INTRAMUSCULAR; INTRAVENOUS
Status: COMPLETED | OUTPATIENT
Start: 2020-02-21 | End: 2020-02-21

## 2020-02-21 RX ORDER — IPRATROPIUM BROMIDE AND ALBUTEROL SULFATE 2.5; .5 MG/3ML; MG/3ML
3 SOLUTION RESPIRATORY (INHALATION) ONCE
Status: COMPLETED | OUTPATIENT
Start: 2020-02-21 | End: 2020-02-21

## 2020-02-21 RX ORDER — ALBUTEROL SULFATE 90 UG/1
2 AEROSOL, METERED RESPIRATORY (INHALATION) EVERY 6 HOURS PRN
Qty: 1 INHALER | Refills: 0 | Status: SHIPPED | OUTPATIENT
Start: 2020-02-21

## 2020-02-21 RX ORDER — KETOROLAC TROMETHAMINE 15 MG/ML
10 INJECTION, SOLUTION INTRAMUSCULAR; INTRAVENOUS ONCE
Status: COMPLETED | OUTPATIENT
Start: 2020-02-21 | End: 2020-02-21

## 2020-02-21 RX ADMIN — KETOROLAC TROMETHAMINE 10 MG: 15 INJECTION, SOLUTION INTRAMUSCULAR; INTRAVENOUS at 17:37

## 2020-02-21 RX ADMIN — IPRATROPIUM BROMIDE AND ALBUTEROL SULFATE 3 ML: .5; 3 SOLUTION RESPIRATORY (INHALATION) at 18:30

## 2020-02-21 RX ADMIN — ONDANSETRON HYDROCHLORIDE 4 MG: 2 INJECTION, SOLUTION INTRAMUSCULAR; INTRAVENOUS at 17:11

## 2020-02-21 RX ADMIN — SODIUM CHLORIDE 1000 ML: 9 INJECTION, SOLUTION INTRAVENOUS at 16:11

## 2020-02-21 ASSESSMENT — ENCOUNTER SYMPTOMS
LIGHT-HEADEDNESS: 1
SHORTNESS OF BREATH: 1
VOMITING: 1
COUGH: 1
WEAKNESS: 1
FEVER: 1
BLOOD IN STOOL: 0
SORE THROAT: 1
NAUSEA: 1
DIZZINESS: 1
BACK PAIN: 0
ABDOMINAL PAIN: 0
DIARRHEA: 1
MYALGIAS: 1

## 2020-02-21 NOTE — ED NOTES
Bed: ED31  Expected date: 2/21/20  Expected time:   Means of arrival:   Comments:    44F  Weak, body aches, cough, diarrhea  [id number]

## 2020-02-21 NOTE — ED TRIAGE NOTES
Pt A&Ox4. ABCs intact. Pt reports cough and body aches since Monday. Reports dizziness, nausea, and not able to eat. Pt reports diarrhea started yesterday.

## 2020-02-21 NOTE — ED PROVIDER NOTES
History     Chief Complaint:    Flu Symptoms    The history is provided by the patient.      Iva Bowman is a 45 year old female former smoker with a history of fibromyalgia, hypertension, migraines, and seizures who presents with flu symptoms. The patient states that her symptoms began with a sore throat and a low-grade fever 4 days ago. The following day she developed a cough and the day after that she had 1 episode of emesis. Last night the patient had 3 episodes of diarrhea, but has not had any today. In addition, today she began feeling dizzy and lightheaded, prompting her to call 911. The patient also endorses nausea, myalgias, generalized weakness, and some shortness of breath. She denies back pain, abdominal pain, bloody stools, or urinary symptoms. The patient has not done international travel recently. She has not been around anybody else who is sick. The patient has not been eating or drinking much. She last have Dayquil at 1300 and has not had any ibuprofen today. Of note: the patient was treated with antibiotics for a sinus infection twice in December. She also states that she experienced similar symptoms to today's from December through the beginning of February.      Allergies:  Benzonatate  Haloperidol  Lorazepam  Droperidol  Levothyroxine  Phenothiazines  Prednisone  Aripiprazole  Buspirone  Ceclor [Cephalosporins]  Compazine  Dihydroergotamine  Fluoxetine  Olanzapine  Phenergan [Promethazine Hcl]  Reglan [Metoclopramide]  Risperidone  Tessalon Perles [Benzonatate]  Cefaclor  Gabapentin  Gnp Allergy-Congestion Relief  No Clinical Screening - See Comments  Penicillins  Propranolol  Pseudoephedrine  Rizatriptan  Sumatriptan    Medications:    Albuterol inhaler  Excedrin migraine  Cholecalciferol   Flonase  Flovent HFA  Emgality  Keppra  Lisinopril  Provera  Melatonin  Naprosyn  Zofran  Miralax  Trintellix     Past Medical History:    Conduct disorder  Borderline personality disorder in  adult  Paroxysmal SVT (supraventricular tachycardia)  Seizures  Disorder of uterus  Suicidal behavior without attempted self-injury  Memory problem   Prediabetes   Calculus of kidney  IBS  Migraines  Drug overdose, multiple drugs  Gottron's papules  Fibromyalgia  Mixed stress and urge urinary incontinence  Partial epilepsy  PTSD  Undifferentiated somatoform disorder  Seasonal allergic rhinitis  Disorder of skin or subcutaneous tissu  Genital herpes  Achilles bursitis or tendinitis  Cervicalgia  Iron deficiency anemia  Anxiety  Arrhythmia  Depression  GERD  Herpes simplex  Hypertension  Hypothyroidism  IBS  Migraines  Seizures  External hemorrhoids    Past Surgical History:    Right ankle arthroscopy  Cardiac ablation  Cholecystectomy, laparoscopic  Cryotherapy, cervical  D&C, operative hysterectomy with morcellator, combined  Foot surgery  Heel ulcer treatment  PE tubes placed  Retrocalcaneal bursitis and Sarah deformity treatment  Tonsillectomy     Family History:    Sjogren's syndrome - mother  Obesity - mother  Depression - mother  Hyperlipidemia - father  Bipolar disorder - son   Coronary artery disease - mother    Social History:  Negative for tobacco use - former smoker, quit 1998.  Positive for alcohol use - 5 times/year.  Negative for drug use.  PCP: [facility].   Marital Status:   [4]     Review of Systems   Constitutional: Positive for fever.   HENT: Positive for sore throat.    Respiratory: Positive for cough and shortness of breath.    Gastrointestinal: Positive for diarrhea, nausea and vomiting. Negative for abdominal pain and blood in stool.   Genitourinary:        Denies urinary symptoms    Musculoskeletal: Positive for myalgias. Negative for back pain.   Neurological: Positive for dizziness, weakness and light-headedness.   All other systems reviewed and are negative.        Physical Exam     Patient Vitals for the past 24 hrs:   BP Temp Temp src Pulse Resp SpO2   02/21/20 1715 131/82 --  -- 66 16 99 %   02/21/20 1554 (!) 142/95 -- -- -- -- --   02/21/20 1553 -- 99.2  F (37.3  C) Oral 83 18 98 %     Physical Exam  Nursing note and vitals reviewed.    Constitutional: Pleasant and well groomed.  Appears to feel poorly         HENT:    Mouth/Throat: Oropharynx is without swelling or asymmetry. Erythema is present. Oral mucosa moist. Speaking with a normal voice   Eyes: Conjunctivae are normal. No scleral icterus.    Neck: Neck supple.   Cardiovascular: Normal rate, regular rhythm and intact distal pulses.    Pulmonary/Chest: Effort normal and breath sounds normal.   Abdominal: Soft.  No distension. There is no tenderness.   Musculoskeletal:  No edema, No calf tenderness    Neurological:Alert. Coordination normal.   Skin: Skin is warm and dry.   Psychiatric: Normal mood and affect.     Emergency Department Course     Imaging:  Radiology findings were communicated with the patient who voiced understanding of the findings.    XR  Chest 2 Views:   Negative chest, as per radiology.     Laboratory:  Laboratory findings were communicated with the patient who voiced understanding of the findings.    CBC: WBC 13.2 H o/w WNL (HGB 15.2, )  BMP: Chloride 110 H, Glucose 111 H o/w WNL (Creatinine 0.69)  CK total: 86  HCG qualitative: Negative    UA: clear, yellow urine o/w negative     Influenza A/B antigen: Negative  Streptococcus A rapid Scr w Reflx to PCR: Negative  Group A Streptococcus PCR Throat Swab: Pending    Interventions:  1611: 0.9% sodium chloride BOLUS 1 L IV  1711: Zofran 4 mg IV  1737: Toradol 10 mg IV  1830: Duoneb 3 mL Nebulization    Emergency Department Course:  Past medical records, nursing notes, and vitals reviewed.    1630: I performed an exam of the patient as documented above.     IV was inserted and blood was drawn for laboratory testing, results above.    The patient provided a urine sample here in the emergency department. This was sent for laboratory testing, findings  above.    The patient was sent for a chest x-ray while in the emergency department, results above     I rechecked the patient and discussed the results of her workup thus far. She is feeling better and tolerating PO.     I personally reviewed the laboratory and imaging results with the Patient and answered all related questions prior to discharge.         Impression & Plan     Medical Decision Making:  Iva Bowman is a 45 year old female who presents with 5 days of influenza-like illness including myalgias, rhinorrhea, cough, sore throat, poor p.o. intake, nausea and vomiting last night.  Today she began to feel lightheaded.  Differential diagnosis was broad and included but was not limited to influenza, strep pharyngitis, pneumonia, dehydration, urinary tract infection, electrolyte abnormality.  ED evaluation is as noted above and generally unremarkable.  Her symptoms are consistent with an influenza-like illness.  She is 4 to 5 days into symptoms and would not benefit from Tamiflu.  Feeling better with the symptoms as described above.  Recommend continue oral hydration and supportive treatment as an outpatient.  She understands to return with new or worsening symptoms.  Otherwise follow-up in 2 days in clinic if not improving as anticipated.        Diagnosis:    ICD-10-CM    1. Influenza-like illness R69 Streptococcus A Rapid Scr w Reflx to PCR     UA with Microscopic     Group A Streptococcus PCR Throat Swab     Group A Streptococcus PCR Throat Swab   2. Acute bronchitis, unspecified organism J20.9        Disposition:  Discharged to home.    Discharge Medications:  Discharge Medication List as of 2/21/2020  7:05 PM        Scribe Disclosure:  I, Silvia Mckeon, am serving as a scribe at 4:20 PM on 2/21/2020 to document services personally performed by Stefanie Krishnamurthy MD based on my observations and the provider's statements to me.     Silvia Mckeon  2/21/2020   [facility] EMERGENCY  DEPARTMENT       [facility], Stefanie Salmeron MD  02/23/20 5563

## 2020-02-21 NOTE — ED AVS SNAPSHOT
[facility] Emergency Department  [address]  [facility] [address]  Phone:  [phone]  Fax:  [phone]                                    Iva Bowman   MRN: [id number]    Department:  [facility] Emergency Department   Date of Visit:  2/21/2020           After Visit Summary Signature Page    I have received my discharge instructions, and my questions have been answered. I have discussed any challenges I see with this plan with the nurse or doctor.    ..........................................................................................................................................  Patient/Patient Representative Signature      ..........................................................................................................................................  Patient Representative Print Name and Relationship to Patient    ..................................................               ................................................  Date                                   Time    ..........................................................................................................................................  Reviewed by Signature/Title    ...................................................              ..............................................  Date                                               Time          22EPIC Rev 08/18

## 2020-02-22 NOTE — DISCHARGE INSTRUCTIONS
Discharge Instructions  Bronchitis, Pneumonia, Bronchospasm    You were seen today for a chest infection or inflammation. If your provider decided this was due to a bacterial infection, you may need an antibiotic. Sometimes these are caused by a virus, and then an antibiotic will not help.     Generally, every Emergency Department visit should have a follow-up clinic visit with either a primary or a specialty clinic/provider. Please follow-up as instructed by your emergency provider today.    Return to the Emergency Department if:  Your breathing gets much worse.  You are very weak, or feel much more ill.  You develop new symptoms, such as chest pain.  You cough up blood.  You are vomiting (throwing up) enough that you cannot keep fluids or your medicine down.    What can I do to help myself?  Fill any prescriptions the provider gave you and take them right away--especially antibiotics. Be sure to finish the whole antibiotic prescription.  You may be given a prescription for an inhaler, which can help loosen tight air passages.  Use this as needed, but not more often than directed. Inhalers work much better when used with a spacer.   You may be given a prescription for a steroid to reduce inflammation. Used long-term, these can have side effects, but for short-term use they are safe. You may notice restlessness or increased appetite.      You may use non-prescription cough or cold medicines. Cough medicines may help, but don t make the cough go away completely.   Avoid smoke, because this can make your symptoms worse. If you smoke, this may be a good time to quit! Consider using nicotine lozenges, gum, or patches to reduce cravings.   If you have a fever, Tylenol  (acetaminophen), Motrin  (ibuprofen), or Advil  (ibuprofen) may help bring fever down and may help you feel more comfortable. Be sure to read and follow the package directions, and ask your provider if you have questions.  Be sure to get your flu shot each  year.  For certain ages, the pneumonia shot can help prevent pneumonia.  If you were given a prescription for medicine here today, be sure to read all of the information (including the package insert) that comes with your prescription.  This will include important information about the medicine, its side effects, and any warnings that you need to know about.  The pharmacist who fills the prescription can provide more information and answer questions you may have about the medicine.  If you have questions or concerns that the pharmacist cannot address, please call or return to the Emergency Department.     Remember that you can always come back to the Emergency Department if you are not able to see your regular provider in the amount of time listed above, if you get any new symptoms, or if there is anything that worries you.  Discharge Instructions  Influenza    You were diagnosed today with influenza or influenza like illness.  Influenza is a respiratory (breathing) illness caused by influenza A or B viruses.  Influenza causes five primary symptoms: fever, headache, muscle aches/fatigue/malaise, sore throat and cough.  These symptoms start one to four days after you have been around a person with this illness. Influenza is spread through sneezing and coughing and can live on surfaces for several days.  It is usually contagious for 5 days but in some cases up to 10 days and often affects several family members. If you have a family member who is less than 2 years old, older than 65 years old, pregnant or has a serious medical condition, they should be seen right away by a provider to decide if they should take preventative medications. Although influenza will make you feel very ill, most patients don t require any specific treatment. An antiviral medication might be prescribed for certain groups of patients (older patients, younger patients, and those with certain chronic medical problems).    Generally, every  Emergency Department visit should have a follow-up clinic visit with either a primary or a specialty clinic/provider. Please follow-up as instructed by your emergency provider today.    Return to the Emergency Department if:  You have trouble breathing.  You develop pain in your chest.  You have signs of being dehydrated, such as dizziness or unable to urinate (pee) at least three times daily.  You are confused or severely weak.  You cannot stop vomiting (throwing up) or you cannot drink enough fluids.    In children, you should seek help if the child has any of the above or if child:  Has blue or purplish skin color.  Is so irritable that he or she does not want to be held.  Does not have tears when crying (in infants) or does not urinate at least three times daily.  Does not wake up easily.    What can I do to help myself?  Rest.  Fluids -- Drink hydrating solutions such as Gatorade  or Pedialyte  as often as you can. If you are drinking enough, you should pass urine at least every eight hours.  Tylenol  (acetaminophen) and Advil  (ibuprofen) can relieve fever, headache, and muscle aches. Do not give aspirin to children under 18 years old.   Antiviral treatment -- Antiviral medicines do not make the flu symptoms go away immediately.  They have only been shown to make the symptoms go away 12 to 24 hours sooner than they would without treatment.     Antibiotics -- Antibiotics are NOT useful for treating viral illnesses such as influenza. Antibiotics should only be used if there is a bacterial complication of the flu such as bacterial pneumonia, ear infection, or sinusitis.  Because you were diagnosed with a flu like illness you are very contagious.  This means you cannot work, attend school or  for at least 24 hours or until you no longer have a fever.  If you were given a prescription for medicine here today, be sure to read all of the information (including the package insert) that comes with your  prescription.  This will include important information about the medicine, its side effects, and any warnings that you need to know about.  The pharmacist who fills the prescription can provide more information and answer questions you may have about the medicine.  If you have questions or concerns that the pharmacist cannot address, please call or return to the Emergency Department.   Remember that you can always come back to the Emergency Department if you are not able to see your regular provider in the amount of time listed above, if you get any new symptoms, or if there is anything that worries you.

## 2020-02-27 ENCOUNTER — HOSPITAL ENCOUNTER (EMERGENCY)
Facility: CLINIC | Age: 45
Discharge: HOME | End: 2020-02-27
Attending: EMERGENCY MEDICINE | Admitting: EMERGENCY MEDICINE
Payer: MEDICARE

## 2020-02-27 ENCOUNTER — APPOINTMENT (OUTPATIENT)
Dept: CT IMAGING | Facility: CLINIC | Age: 45
End: 2020-02-27
Attending: EMERGENCY MEDICINE
Payer: MEDICARE

## 2020-02-27 VITALS
DIASTOLIC BLOOD PRESSURE: 114 MMHG | BODY MASS INDEX: 35.19 KG/M2 | OXYGEN SATURATION: 98 % | WEIGHT: 205 LBS | TEMPERATURE: 97.5 F | SYSTOLIC BLOOD PRESSURE: 165 MMHG | RESPIRATION RATE: 16 BRPM | HEART RATE: 96 BPM

## 2020-02-27 DIAGNOSIS — K57.30 DIVERTICULOSIS OF LARGE INTESTINE WITHOUT HEMORRHAGE: ICD-10-CM

## 2020-02-27 DIAGNOSIS — K44.9 HIATAL HERNIA: ICD-10-CM

## 2020-02-27 DIAGNOSIS — N83.201 CYST OF RIGHT OVARY: ICD-10-CM

## 2020-02-27 DIAGNOSIS — R10.32 ABDOMINAL PAIN, LEFT LOWER QUADRANT: ICD-10-CM

## 2020-02-27 DIAGNOSIS — N20.0 KIDNEY STONE: ICD-10-CM

## 2020-02-27 LAB
ALBUMIN SERPL-MCNC: 3.6 G/DL (ref 3.4–5)
ALBUMIN UR-MCNC: 50 MG/DL
ALP SERPL-CCNC: 140 U/L (ref 40–150)
ALT SERPL W P-5'-P-CCNC: 34 U/L (ref 0–50)
ANION GAP SERPL CALCULATED.3IONS-SCNC: 3 MMOL/L (ref 3–14)
APPEARANCE UR: ABNORMAL
AST SERPL W P-5'-P-CCNC: 23 U/L (ref 0–45)
BASOPHILS # BLD AUTO: 0.1 10E9/L (ref 0–0.2)
BASOPHILS NFR BLD AUTO: 1.2 %
BILIRUB SERPL-MCNC: 0.3 MG/DL (ref 0.2–1.3)
BILIRUB UR QL STRIP: NEGATIVE
BUN SERPL-MCNC: 8 MG/DL (ref 7–30)
CALCIUM SERPL-MCNC: 9 MG/DL (ref 8.5–10.1)
CHLORIDE SERPL-SCNC: 109 MMOL/L (ref 94–109)
CO2 SERPL-SCNC: 29 MMOL/L (ref 20–32)
COLOR UR AUTO: ABNORMAL
CREAT SERPL-MCNC: 0.79 MG/DL (ref 0.52–1.04)
DIFFERENTIAL METHOD BLD: ABNORMAL
EOSINOPHIL # BLD AUTO: 0.1 10E9/L (ref 0–0.7)
EOSINOPHIL NFR BLD AUTO: 1.5 %
ERYTHROCYTE [DISTWIDTH] IN BLOOD BY AUTOMATED COUNT: 11.6 % (ref 10–15)
GFR SERPL CREATININE-BSD FRML MDRD: >90 ML/MIN/{1.73_M2}
GLUCOSE SERPL-MCNC: 104 MG/DL (ref 70–99)
GLUCOSE UR STRIP-MCNC: NEGATIVE MG/DL
HCT VFR BLD AUTO: 48.8 % (ref 35–47)
HGB BLD-MCNC: 16.3 G/DL (ref 11.7–15.7)
HGB UR QL STRIP: ABNORMAL
IMM GRANULOCYTES # BLD: 0.1 10E9/L (ref 0–0.4)
IMM GRANULOCYTES NFR BLD: 0.8 %
KETONES UR STRIP-MCNC: ABNORMAL MG/DL
LEUKOCYTE ESTERASE UR QL STRIP: ABNORMAL
LIPASE SERPL-CCNC: 136 U/L (ref 73–393)
LYMPHOCYTES # BLD AUTO: 1.6 10E9/L (ref 0.8–5.3)
LYMPHOCYTES NFR BLD AUTO: 21.6 %
MCH RBC QN AUTO: 30.1 PG (ref 26.5–33)
MCHC RBC AUTO-ENTMCNC: 33.4 G/DL (ref 31.5–36.5)
MCV RBC AUTO: 90 FL (ref 78–100)
MONOCYTES # BLD AUTO: 0.9 10E9/L (ref 0–1.3)
MONOCYTES NFR BLD AUTO: 12.6 %
MUCOUS THREADS #/AREA URNS LPF: PRESENT /LPF
NEUTROPHILS # BLD AUTO: 4.7 10E9/L (ref 1.6–8.3)
NEUTROPHILS NFR BLD AUTO: 62.3 %
NITRATE UR QL: NEGATIVE
NRBC # BLD AUTO: 0 10*3/UL
NRBC BLD AUTO-RTO: 0 /100
PH UR STRIP: 5.5 PH (ref 5–7)
PLATELET # BLD AUTO: 304 10E9/L (ref 150–450)
POTASSIUM SERPL-SCNC: 3.3 MMOL/L (ref 3.4–5.3)
PROT SERPL-MCNC: 7.3 G/DL (ref 6.8–8.8)
RBC # BLD AUTO: 5.41 10E12/L (ref 3.8–5.2)
RBC #/AREA URNS AUTO: >182 /HPF (ref 0–2)
SODIUM SERPL-SCNC: 141 MMOL/L (ref 133–144)
SOURCE: ABNORMAL
SP GR UR STRIP: 1.03 (ref 1–1.03)
SQUAMOUS #/AREA URNS AUTO: 2 /HPF (ref 0–1)
UROBILINOGEN UR STRIP-MCNC: NORMAL MG/DL (ref 0–2)
WBC # BLD AUTO: 7.5 10E9/L (ref 4–11)
WBC #/AREA URNS AUTO: 9 /HPF (ref 0–5)

## 2020-02-27 PROCEDURE — 25000128 H RX IP 250 OP 636: Performed by: EMERGENCY MEDICINE

## 2020-02-27 PROCEDURE — 74177 CT ABD & PELVIS W/CONTRAST: CPT

## 2020-02-27 PROCEDURE — 96376 TX/PRO/DX INJ SAME DRUG ADON: CPT

## 2020-02-27 PROCEDURE — 99285 EMERGENCY DEPT VISIT HI MDM: CPT | Mod: 25

## 2020-02-27 PROCEDURE — 81001 URINALYSIS AUTO W/SCOPE: CPT | Performed by: EMERGENCY MEDICINE

## 2020-02-27 PROCEDURE — 25000125 ZZHC RX 250: Performed by: EMERGENCY MEDICINE

## 2020-02-27 PROCEDURE — 96374 THER/PROPH/DIAG INJ IV PUSH: CPT | Mod: 59

## 2020-02-27 PROCEDURE — 96361 HYDRATE IV INFUSION ADD-ON: CPT

## 2020-02-27 PROCEDURE — 85025 COMPLETE CBC W/AUTO DIFF WBC: CPT | Performed by: EMERGENCY MEDICINE

## 2020-02-27 PROCEDURE — 80053 COMPREHEN METABOLIC PANEL: CPT | Performed by: EMERGENCY MEDICINE

## 2020-02-27 PROCEDURE — 25800030 ZZH RX IP 258 OP 636: Performed by: EMERGENCY MEDICINE

## 2020-02-27 PROCEDURE — 83690 ASSAY OF LIPASE: CPT | Performed by: EMERGENCY MEDICINE

## 2020-02-27 PROCEDURE — 96375 TX/PRO/DX INJ NEW DRUG ADDON: CPT

## 2020-02-27 RX ORDER — ONDANSETRON 2 MG/ML
4 INJECTION INTRAMUSCULAR; INTRAVENOUS ONCE
Status: COMPLETED | OUTPATIENT
Start: 2020-02-27 | End: 2020-02-27

## 2020-02-27 RX ORDER — IOPAMIDOL 755 MG/ML
500 INJECTION, SOLUTION INTRAVASCULAR ONCE
Status: COMPLETED | OUTPATIENT
Start: 2020-02-27 | End: 2020-02-27

## 2020-02-27 RX ORDER — SODIUM CHLORIDE 9 MG/ML
1000 INJECTION, SOLUTION INTRAVENOUS CONTINUOUS
Status: DISCONTINUED | OUTPATIENT
Start: 2020-02-27 | End: 2020-02-27 | Stop reason: HOSPADM

## 2020-02-27 RX ORDER — HYDROMORPHONE HYDROCHLORIDE 1 MG/ML
0.5 INJECTION, SOLUTION INTRAMUSCULAR; INTRAVENOUS; SUBCUTANEOUS ONCE
Status: COMPLETED | OUTPATIENT
Start: 2020-02-27 | End: 2020-02-27

## 2020-02-27 RX ORDER — HYDROMORPHONE HYDROCHLORIDE 1 MG/ML
0.5 INJECTION, SOLUTION INTRAMUSCULAR; INTRAVENOUS; SUBCUTANEOUS
Status: COMPLETED | OUTPATIENT
Start: 2020-02-27 | End: 2020-02-27

## 2020-02-27 RX ADMIN — IOPAMIDOL 68 ML: 755 INJECTION, SOLUTION INTRAVENOUS at 16:50

## 2020-02-27 RX ADMIN — SODIUM CHLORIDE 1000 ML: 9 INJECTION, SOLUTION INTRAVENOUS at 15:47

## 2020-02-27 RX ADMIN — HYDROMORPHONE HYDROCHLORIDE 0.5 MG: 1 INJECTION, SOLUTION INTRAMUSCULAR; INTRAVENOUS; SUBCUTANEOUS at 15:48

## 2020-02-27 RX ADMIN — ONDANSETRON 4 MG: 2 INJECTION INTRAMUSCULAR; INTRAVENOUS at 15:47

## 2020-02-27 RX ADMIN — SODIUM CHLORIDE 40 ML: 9 INJECTION, SOLUTION INTRAVENOUS at 16:50

## 2020-02-27 RX ADMIN — HYDROMORPHONE HYDROCHLORIDE 0.5 MG: 1 INJECTION, SOLUTION INTRAMUSCULAR; INTRAVENOUS; SUBCUTANEOUS at 16:43

## 2020-02-27 ASSESSMENT — ENCOUNTER SYMPTOMS
DIARRHEA: 1
CONSTIPATION: 0
DYSURIA: 0
ABDOMINAL PAIN: 1
FEVER: 0

## 2020-02-27 NOTE — ED PROVIDER NOTES
History     Chief Complaint:  Abdominal Pain    HPI   Iva Bowman is a 45 year old female with history of IBS who presents with abdominal pain. The patient has been sick for about 2 weeks and was diagnosed with an influenza-like illness and acute bronchitis on 2/21. She has been taking Tylenol and cold medicine for her symptoms. Her fever has since improved though she developed lower abdominal pain that has been constant for the past 2 days. She has had similar pain before when constipated. However, the patient has instead been having diarrhea when she eats, prompting her to eat less. She denies dysuria. Of note, the patient has had a cholecystectomy though no other abdominal surgeries.    Allergies:  Benzonatate  Haloperidol  Lorazepam  Droperidol  Levothyroxine  Phenothiazines  Prednisone  Aripiprazole  Buspirone  Ceclor [Cephalosporins]  Compazine  Dihydroergotamine  Fluoxetine  Olanzapine  Phenergan [Promethazine Hcl]  Reglan [Metoclopramide]  Risperidone  Tessalon Perles [Benzonatate]  Cefaclor  Gabapentin  Gnp Allergy-Congestion Relief  No Clinical Screening - See Comments  Penicillins  Propranolol  Pseudoephedrine  Rizatriptan  Sumatriptan     Medications:    Albuterol inhaler  Excedrin migraine  Cholecalciferol   Flonase  Flovent HFA  Emgality  Keppra  Lisinopril  Provera  Melatonin  Naprosyn  Zofran  Miralax  Trintellix      Past Medical History:    Conduct disorder  Borderline personality disorder in adult  Paroxysmal SVT (supraventricular tachycardia)  Seizures  Disorder of uterus  Suicidal behavior without attempted self-injury  Memory problem   Prediabetes   Calculus of kidney  IBS  Migraines  Drug overdose, multiple drugs  Gottron's papules  Fibromyalgia  Mixed stress and urge urinary incontinence  Partial epilepsy  PTSD  Undifferentiated somatoform disorder  Seasonal allergic rhinitis  Disorder of skin or subcutaneous tissu  Genital herpes  Achilles bursitis or tendinitis  Cervicalgia  Iron  deficiency anemia  Anxiety  Arrhythmia  Depression  GERD  Herpes simplex  Hypertension  Hypothyroidism  External hemorrhoids     Past Surgical History:    Right ankle arthroscopy  Cardiac ablation  Cholecystectomy, laparoscopic  Cryotherapy, cervical  D&C, operative hysterectomy with morcellator, combined  Foot surgery  Heel ulcer treatment  PE tubes placed  Retrocalcaneal bursitis and Sarah deformity treatment  Tonsillectomy      Family History:    Sjogren's syndrome - mother  Obesity - mother  Depression - mother  Hyperlipidemia - father  Bipolar disorder - son   Coronary artery disease - mother     Social History:  Negative for tobacco use - former smoker, quit 1998.  Positive for alcohol use - 5 times/year.  Negative for drug use.  PCP: [facility].   Marital Status:   [4]   PCP: [facility]     Review of Systems   Constitutional: Negative for fever.   Gastrointestinal: Positive for abdominal pain and diarrhea. Negative for constipation.   Genitourinary: Negative for dysuria.   All other systems reviewed and are negative.    Physical Exam     Patient Vitals for the past 24 hrs:   BP Temp Temp src Pulse Heart Rate Resp SpO2 Weight   02/27/20 1615 -- -- -- -- -- -- 98 % --   02/27/20 1600 -- -- -- -- -- -- 96 % --   02/27/20 1545 (!) 165/114 -- -- -- -- -- 100 % --   02/27/20 1510 (!) 141/106 -- -- 96 -- -- 97 % --   02/27/20 1430 (!) 164/115 97.5  F (36.4  C) Temporal -- 114 16 98 % 93 kg (205 lb)        Physical Exam  General: Patient is alert and interactive when I enter the room  Head:  The scalp, face, and head appear normal  Eyes:  Conjunctivae are normal  ENT:    The nose is normal    Pinnae are normal    External acoustic canals are normal  Neck:  Trachea midline  CV:  Pulses are normal     Resp:  No respiratory distress   Abdomen:      Soft, RLQ and LLQ tenderness, no guarding, non-distended  Musc:  Normal muscular tone    No major joint effusions    No asymmetric leg swelling  Skin:  No  rash or lesions noted  Neuro:  Speech is normal and fluent. Face is symmetric.     Moving all extremities well.   Psych: Awake. Alert.  Normal affect.  Appropriate interactions.       Emergency Department Course     Imaging:  Radiology findings were communicated with the patient who voiced understanding of the findings.    CT abdomen pelvis w/ IV contrast:   CT Abdomen Pelvis w Contrast   Final Result   IMPRESSION:    1.  Appendix is normal on image 163 .   2.  Hypodensity in the right ovary 1.3 x 1.5 cm; ultrasound characterization would be helpful.   3.  Bilateral nonobstructing renal calculi.   4.  Hiatal hernia.   5.  Colonic diverticulosis without diverticulitis.             Laboratory:  Laboratory findings were communicated with the patient who voiced understanding of the findings.    CBC: WBC 7.5, HGB 16.3 (H),    CMP: potassium 3.3 (L), glucose 104 (H) o/w WNL (Creatinine 0.79)    Lipase: 136     UA with Microscopic: pending    Interventions:  1547 Zofran 4 mg IV   NS 1 L IV  1548 Dilaudid 0.5 mg IV  1643 Dilaudid 0.5 mg IV     Emergency Department Course:  Past medical records, nursing notes, and vitals reviewed.    1510 I performed an exam of the patient as documented above.     IV was inserted and blood was drawn for laboratory testing, results above.  The patient provided a urine sample here in the emergency department. This was sent for laboratory testing, findings above.  The patient was sent for a abdomen/ pelvis CT while in the emergency department, results above.     Patient rechecked and updated.      I personally reviewed the laboratory and imaging results with the Patient and answered all related questions prior to discharge.      Impression & Plan     Medical Decision Making:  Debbie Bowman is a 45 year old female who presents with abdominal pain after an CHRISTIANO illness for 2 weeks. She looks overall well although does have RLQ and LLQ tenderness. A broad differential diagnosis was of  course considered. The workup in the ED is at this point negative.  No etiology for the patients pain is found at this point and my suspicion of an intraabdominal catastrophe or other worrisome etiology is very low. CT and lab work are reassuring. She does have many incidental findings which were discussed with patient. She does have a cyst on her right ovary but with her pain on both right and left, doubt this is the etiology of her pain. Could be secondary to muscle strain from coughing. Patient was instructed to follow-up with PCP and gynecology. Return precautions given.       Discharge Diagnosis:    ICD-10-CM    1. Abdominal pain, left lower quadrant R10.32    2. Diverticulosis of large intestine without hemorrhage K57.30    3. Hiatal hernia K44.9    4. Kidney stone N20.0    5. Cyst of right ovary N83.201        Disposition:  Discharged to home    Discharge Medications:  New Prescriptions    No medications on file       Scribe Disclosure:  Priscilla PERERA, am serving as a scribe at 3:10 PM on 2/27/2020 to document services personally performed by Lindsay Westbrook MD based on my observations and the provider's statements to me.       Lindsay Westbrook MD  02/29/20 [id number]

## 2020-02-27 NOTE — ED TRIAGE NOTES
Pt here with c/o generalized lower abd pain x3 days. C/o nausea and diarrhea. No vomiting or dysuria. ABC intact.

## 2020-02-27 NOTE — ED AVS SNAPSHOT
[facility] Emergency Department  [address]  [facility] [address]  Phone:  [phone]  Fax:  [phone]                                    Iva Bowman   MRN: [id number]    Department:  [facility] Emergency Department   Date of Visit:  2/27/2020           After Visit Summary Signature Page    I have received my discharge instructions, and my questions have been answered. I have discussed any challenges I see with this plan with the nurse or doctor.    ..........................................................................................................................................  Patient/Patient Representative Signature      ..........................................................................................................................................  Patient Representative Print Name and Relationship to Patient    ..................................................               ................................................  Date                                   Time    ..........................................................................................................................................  Reviewed by Signature/Title    ...................................................              ..............................................  Date                                               Time          22EPIC Rev 08/18

## 2020-03-02 ENCOUNTER — HOSPITAL ENCOUNTER (EMERGENCY)
Facility: CLINIC | Age: 45
Discharge: HOME | End: 2020-03-02
Attending: EMERGENCY MEDICINE | Admitting: EMERGENCY MEDICINE
Payer: MEDICARE

## 2020-03-02 DIAGNOSIS — R51.9 NONINTRACTABLE HEADACHE, UNSPECIFIED CHRONICITY PATTERN, UNSPECIFIED HEADACHE TYPE: ICD-10-CM

## 2020-03-02 DIAGNOSIS — J02.9 PHARYNGITIS, UNSPECIFIED ETIOLOGY: ICD-10-CM

## 2020-03-02 LAB
DEPRECATED S PYO AG THROAT QL EIA: NEGATIVE
SPECIMEN SOURCE: NORMAL
SPECIMEN SOURCE: NORMAL
STREP GROUP A PCR: NOT DETECTED

## 2020-03-02 PROCEDURE — 25000132 ZZH RX MED GY IP 250 OP 250 PS 637: Mod: GY | Performed by: EMERGENCY MEDICINE

## 2020-03-02 PROCEDURE — 87651 STREP A DNA AMP PROBE: CPT | Performed by: EMERGENCY MEDICINE

## 2020-03-02 PROCEDURE — 96375 TX/PRO/DX INJ NEW DRUG ADDON: CPT

## 2020-03-02 PROCEDURE — 99284 EMERGENCY DEPT VISIT MOD MDM: CPT | Mod: 25

## 2020-03-02 PROCEDURE — 25000128 H RX IP 250 OP 636: Performed by: EMERGENCY MEDICINE

## 2020-03-02 PROCEDURE — 40001204 ZZHCL STATISTIC STREP A RAPID: Performed by: EMERGENCY MEDICINE

## 2020-03-02 PROCEDURE — 96361 HYDRATE IV INFUSION ADD-ON: CPT

## 2020-03-02 PROCEDURE — 25800030 ZZH RX IP 258 OP 636: Performed by: EMERGENCY MEDICINE

## 2020-03-02 PROCEDURE — 96374 THER/PROPH/DIAG INJ IV PUSH: CPT

## 2020-03-02 RX ORDER — KETOROLAC TROMETHAMINE 15 MG/ML
15 INJECTION, SOLUTION INTRAMUSCULAR; INTRAVENOUS ONCE
Status: COMPLETED | OUTPATIENT
Start: 2020-03-02 | End: 2020-03-02

## 2020-03-02 RX ORDER — DEXAMETHASONE SODIUM PHOSPHATE 10 MG/ML
10 INJECTION, SOLUTION INTRAMUSCULAR; INTRAVENOUS ONCE
Status: COMPLETED | OUTPATIENT
Start: 2020-03-02 | End: 2020-03-02

## 2020-03-02 RX ORDER — OLANZAPINE 5 MG/1
5 TABLET, ORALLY DISINTEGRATING ORAL ONCE
Status: COMPLETED | OUTPATIENT
Start: 2020-03-02 | End: 2020-03-02

## 2020-03-02 RX ORDER — DIPHENHYDRAMINE HYDROCHLORIDE 50 MG/ML
25 INJECTION INTRAMUSCULAR; INTRAVENOUS ONCE
Status: COMPLETED | OUTPATIENT
Start: 2020-03-02 | End: 2020-03-02

## 2020-03-02 RX ORDER — METOCLOPRAMIDE HYDROCHLORIDE 5 MG/ML
10 INJECTION INTRAMUSCULAR; INTRAVENOUS ONCE
Status: DISCONTINUED | OUTPATIENT
Start: 2020-03-02 | End: 2020-03-02

## 2020-03-02 RX ORDER — ONDANSETRON 2 MG/ML
4 INJECTION INTRAMUSCULAR; INTRAVENOUS ONCE
Status: DISCONTINUED | OUTPATIENT
Start: 2020-03-02 | End: 2020-03-02 | Stop reason: HOSPADM

## 2020-03-02 RX ADMIN — SODIUM CHLORIDE 1000 ML: 9 INJECTION, SOLUTION INTRAVENOUS at 18:23

## 2020-03-02 RX ADMIN — DIPHENHYDRAMINE HYDROCHLORIDE 25 MG: 50 INJECTION, SOLUTION INTRAMUSCULAR; INTRAVENOUS at 17:40

## 2020-03-02 RX ADMIN — KETOROLAC TROMETHAMINE 15 MG: 15 INJECTION, SOLUTION INTRAMUSCULAR; INTRAVENOUS at 17:40

## 2020-03-02 RX ADMIN — DEXAMETHASONE SODIUM PHOSPHATE 10 MG: 10 INJECTION, SOLUTION INTRAMUSCULAR; INTRAVENOUS at 17:40

## 2020-03-02 RX ADMIN — OLANZAPINE 5 MG: 5 TABLET, ORALLY DISINTEGRATING ORAL at 18:58

## 2020-03-02 ASSESSMENT — ENCOUNTER SYMPTOMS
SORE THROAT: 1
FEVER: 1
HEADACHES: 1
VOMITING: 0
NAUSEA: 1
NUMBNESS: 0

## 2020-03-02 NOTE — ED AVS SNAPSHOT
[facility] Emergency Department  [address]  [facility] [address]  Phone:  [phone]  Fax:  [phone]                                    Iva Bowman   MRN: [id number]    Department:  [facility] Emergency Department   Date of Visit:  3/2/2020           After Visit Summary Signature Page    I have received my discharge instructions, and my questions have been answered. I have discussed any challenges I see with this plan with the nurse or doctor.    ..........................................................................................................................................  Patient/Patient Representative Signature      ..........................................................................................................................................  Patient Representative Print Name and Relationship to Patient    ..................................................               ................................................  Date                                   Time    ..........................................................................................................................................  Reviewed by Signature/Title    ...................................................              ..............................................  Date                                               Time          22EPIC Rev 08/18

## 2020-03-02 NOTE — ED PROVIDER NOTES
"  History     Chief Complaint:  Headache    The history is provided by the patient.      Iva Bowman is a 45 year old female former smoker with a history of fibromyalgia, anxiety, hypertension, migraines, and partial epilepsy, who presents for evaluation of a headache. The patient reports 3 days of gradual frontal migraine with associated nausea, which is consistent with her typical headaches. She endorses taking Sumatriptan 2 days ago with minimal, temporary relief. She also reports a subjective fever, but denies any vomiting or unilateral numbness. She remarks that she has seen 3 neurologists, although she is not currently seeing one as \"the third one gave up on [her].\" The patient's most recent MRI can be seen below.     The patient also mentions she has had a sore throat for about 3 weeks. Per chart review, the patient was seen at [facility] 5 days ago for cough, sinus congestion, and myalgias, and was prescribed a 10 day course of doxycyline. She was also seen in the emergency department 10 days ago and diagnosed with an influenza like-illness and bronchitis after strep and influenza tests were negative. No known ill contacts.      MRI OF THE BRAIN WITHOUT AND WITH CONTRAST 4/15/2019 3:42 PM   IMPRESSION: Nonspecific cerebral white matter changes with  differential as above. Otherwise, normal brain MRI. No evidence for  acute intracranial pathology. No cortical abnormality is identified to  suggest a seizure focus.    Allergies:  Benzonatate  Haloperidol  Lorazepam  Droperidol  Levothyroxine  Phenothiazines  Prednisone  Aripiprazole  Buspirone  Ceclor [Cephalosporins]  Compazine  Dihydroergotamine  Fluoxetine  Olanzapine  Phenergan [Promethazine Hcl]  Reglan [Metoclopramide]  Risperidone  Tessalon Perles [Benzonatate]  Cefaclor  Gabapentin  Gnp Allergy-Congestion Relief  Penicillins  Propranolol  Pseudoephedrine  Rizatriptan  Sumatriptan     Medications:    Albuterol inhaler  Excedrin " migraine  Cholecalciferol   Flonase  Flovent HFA  Emgality  Keppra  Lisinopril  Provera  Melatonin  Naprosyn  Zofran  Miralax  Trintellix   Levothyroxine    Past Medical History:    Conduct disorder  Borderline personality disorder in adult  Paroxysmal SVT (supraventricular tachycardia)  Seizures  Disorder of uterus  Suicidal behavior without attempted self-injury  Memory problem   Prediabetes   Calculus of kidney  IBS  Migraines  Drug overdose, multiple drugs  Gottron's papules  Fibromyalgia  Mixed stress and urge urinary incontinence  Partial epilepsy  PTSD  Undifferentiated somatoform disorder  Seasonal allergic rhinitis  Disorder of skin or subcutaneous tissu  Genital herpes  Achilles bursitis or tendinitis  Cervicalgia  Iron deficiency anemia  Anxiety  Arrhythmia  Depression  GERD  Herpes simplex  Hypertension  Hypothyroidism  IBS  Migraines  Seizures  External hemorrhoids    Past Surgical History:    Right ankle arthroscopy  Cardiac ablation  Cholecystectomy, laparoscopic  Cryotherapy, cervical  D&C, operative hysterectomy with morcellator, combined  Foot surgery  Heel ulcer treatment  PE tubes placed  Retrocalcaneal bursitis and Sarah deformity treatment  Tonsillectomy     Family History:    Mother: Sjogrens syndrome, obesity, depression   Father: hyperlipidemia  Son: bipolar disorder  Mother: CAD    Social History:  The patient was accompanied to the ED by her mother.  Smoking Status: Former Smoker, quit 21.5 years ago  Smokeless Tobacco: Never Used  Alcohol Use: Negative   Drug Use: Negative   Marital Status:   [4]     Review of Systems   Constitutional: Positive for fever (subjective).   HENT: Positive for sore throat.    Gastrointestinal: Positive for nausea. Negative for vomiting.   Neurological: Positive for headaches. Negative for numbness.   All other systems reviewed and are negative.      Physical Exam     Patient Vitals for the past 24 hrs:   BP Temp Pulse Resp SpO2   03/02/20 1940 (!)  158/100 -- 75 -- 97 %   03/02/20 1937 (!) 158/100 -- 86 18 98 %   03/02/20 1930 (!) 138/98 -- 80 -- 100 %   03/02/20 1855 -- -- 82 18 --   03/02/20 1530 (!) 150/105 99.1  F (37.3  C) 109 18 98 %        Physical Exam  General: Well-nourished, nontoxic  Eyes: PERRL, EOMI, no nystagmus.  No scleral icterus or conjunctival injection.    ENT:  Moist mucus membranes, posterior oropharynx clear without erythema or exudates. TM normal bilaterally  Neck: Supple with full range of motion  Respiratory:  Lungs clear to auscultation bilaterally, no crackles/rubs/wheezes.  Good air movement  CV: Normal rate and rhythm, no murmurs/rubs/gallops  GI:  Abdomen soft and non-distended.  No tenderness, guarding or rebound  Skin: Warm, dry.  No rashes or petechiae  MSK: No peripheral edema or calf tenderness. No c/t/l bony tenderness  Neuro: Alert and oriented to person/place/time.  No aphasia/facial droop/dysarthria.  Tongue midline, normal strength at SCM/trapezius/BUE/BLE.  Normal finger to nose. Normal gait.  Negative romberg, sensation intact over face/BUE/BLE  Psychiatric: Blunt affect      Emergency Department Course     Laboratory:  Streptococcus A Rapid Scr w Reflx to PCR: Negative     Group A Streptococcus PCR Throat Swab: pending       Interventions:  1740 Decadron 10 mg IV  1740 Benadryl 25 mg IV  1740 Toradol 15 mg IV  1823 NS 1000 mL IV Bolus   1858 Zyprexa 5 mg PO    Emergency Department Course:   Past medical records, nursing notes, and vitals reviewed.  1723: I performed an exam of the patient and obtained history, as documented above.    The patient was tested for strep while in the emergency department.      Medication and IV fluids administered.     1852 I rechecked and updated the patient.     1934 I rechecked and updated the patient.     Findings and plan explained to the Patient. Patient discharged home with instructions regarding supportive care, medications, and reasons to return. The importance of close  "follow-up was reviewed.     I personally reviewed the laboratory results with the Patient and answered all related questions prior to discharge.     Impression & Plan      Medical Decision Making:  Patient is a 45-year-old female with extensive past medical history including fibromyalgia and migraines presenting for reported headache as well as sore throat.  Patient nontoxic, clinically well-hydrated on arrival.  Rapid strep is negative today, patient made aware that culture still pending.  There is no evidence to suggest retropharyngeal abscess, epiglottitis, history of tonsillectomy.  I have a stronger suspicion that presentation is secondary to more viral etiology.  Patient reports she is already on outpatient antibiotics for management of presumed bronchitis. Patient with clear lungs; doubt pneumonia. I do not feel that formal lab work is indicated at this point in time, the patient is not septic appearing.  She is without meningeal signs.  Regarding patient's headache, it has been gradual in nature and similar to previous headaches.  She is neurologically intact. Reviewed MRI from 6 months prior.  Review of records shows that patient has had 35 visits to the ER since August, last admitted 2/9/2020.  She states going to multiple neurologists, last being [facility], but \"I fired them.  They didn't help me.\"    Patient tends to receive zyprexa for her headaches.  Patient provided analgesia during time in the ED; she reported some improvement.  I again discussed I do not feel emergent imaging/labs are indicated at this time. She denied any chance of pregnancy and is comfortable deferring labs. I offered patient additional neurology referral though she is declining.  We also discussed chronic pain specialist may be of help and recommendation to possibly see a specialist though she states she is in the process of seeing one at this time. She asked that I write zyprexa for home and I discussed I do not typically " write these prescriptions as such a medication benefits from close outpatient monitoring.  Plans for close outpatient f/u with PCP as well as chronic pain specialists, return precautions given.     Diagnosis:    ICD-10-CM    1. Nonintractable headache, unspecified chronicity pattern, unspecified headache type R51    2. Pharyngitis, unspecified etiology J02.9        Disposition:  discharged to home    Scribe Disclosure:  I, Yumi Peterson, am serving as a scribe on 3/2/2020 at 5:58 PM to personally document services performed by Susan Lopez DO based on my observations and the provider's statements to me.      Yumi Peterson  3/2/2020   [facility] EMERGENCY DEPARTMENT           Susan Lopez DO  03/03/20 1009

## 2020-03-02 NOTE — ED TRIAGE NOTES
Patient presents with complaints of migraine  and sore throat. Patient states that migraine started three days ago and she has had the sore throat for three weeks. ABC intact without need for intervention at this time.

## 2020-03-03 VITALS
RESPIRATION RATE: 18 BRPM | SYSTOLIC BLOOD PRESSURE: 158 MMHG | OXYGEN SATURATION: 97 % | DIASTOLIC BLOOD PRESSURE: 100 MMHG | HEART RATE: 75 BPM | TEMPERATURE: 99.1 F

## 2020-03-03 NOTE — RESULT ENCOUNTER NOTE
Final Beta strep group A r/o culture is NEGATIVE for Group A streptococcus.    No treatment or change in treatment per [place] Strep protocol.

## 2020-03-30 ENCOUNTER — HOSPITAL ENCOUNTER (EMERGENCY)
Facility: CLINIC | Age: 45
Discharge: HOME | End: 2020-03-30
Attending: EMERGENCY MEDICINE | Admitting: EMERGENCY MEDICINE
Payer: MEDICARE

## 2020-03-30 VITALS
TEMPERATURE: 98.7 F | HEART RATE: 82 BPM | DIASTOLIC BLOOD PRESSURE: 72 MMHG | SYSTOLIC BLOOD PRESSURE: 136 MMHG | OXYGEN SATURATION: 99 % | RESPIRATION RATE: 16 BRPM

## 2020-03-30 DIAGNOSIS — B97.89 VIRAL RESPIRATORY ILLNESS: ICD-10-CM

## 2020-03-30 DIAGNOSIS — J98.8 VIRAL RESPIRATORY ILLNESS: ICD-10-CM

## 2020-03-30 DIAGNOSIS — G43.909 MIGRAINE WITHOUT STATUS MIGRAINOSUS, NOT INTRACTABLE, UNSPECIFIED MIGRAINE TYPE: ICD-10-CM

## 2020-03-30 DIAGNOSIS — Z71.89 ADVICE GIVEN ABOUT COVID-19 VIRUS INFECTION: ICD-10-CM

## 2020-03-30 PROCEDURE — 99284 EMERGENCY DEPT VISIT MOD MDM: CPT | Mod: 25

## 2020-03-30 PROCEDURE — 96372 THER/PROPH/DIAG INJ SC/IM: CPT

## 2020-03-30 PROCEDURE — 25000128 H RX IP 250 OP 636: Performed by: EMERGENCY MEDICINE

## 2020-03-30 RX ORDER — ONDANSETRON 4 MG/1
4-8 TABLET, ORALLY DISINTEGRATING ORAL EVERY 8 HOURS PRN
Qty: 12 TABLET | Refills: 0 | Status: SHIPPED | OUTPATIENT
Start: 2020-03-30 | End: 2020-03-30

## 2020-03-30 RX ORDER — CODEINE PHOSPHATE AND GUAIFENESIN 10; 100 MG/5ML; MG/5ML
1-2 SOLUTION ORAL EVERY 4 HOURS PRN
Qty: 120 ML | Refills: 0 | Status: SHIPPED | OUTPATIENT
Start: 2020-03-30 | End: 2020-03-30

## 2020-03-30 RX ORDER — ONDANSETRON 4 MG/1
4-8 TABLET, ORALLY DISINTEGRATING ORAL EVERY 8 HOURS PRN
Qty: 12 TABLET | Refills: 0 | Status: SHIPPED | OUTPATIENT
Start: 2020-03-30

## 2020-03-30 RX ORDER — KETOROLAC TROMETHAMINE 15 MG/ML
15 INJECTION, SOLUTION INTRAMUSCULAR; INTRAVENOUS ONCE
Status: COMPLETED | OUTPATIENT
Start: 2020-03-30 | End: 2020-03-30

## 2020-03-30 RX ORDER — OLANZAPINE 10 MG/2ML
5 INJECTION, POWDER, FOR SOLUTION INTRAMUSCULAR ONCE
Status: COMPLETED | OUTPATIENT
Start: 2020-03-30 | End: 2020-03-30

## 2020-03-30 RX ORDER — CODEINE PHOSPHATE AND GUAIFENESIN 10; 100 MG/5ML; MG/5ML
1-2 SOLUTION ORAL EVERY 4 HOURS PRN
Qty: 120 ML | Refills: 0 | Status: SHIPPED | OUTPATIENT
Start: 2020-03-30

## 2020-03-30 RX ADMIN — KETOROLAC TROMETHAMINE 15 MG: 15 INJECTION, SOLUTION INTRAMUSCULAR; INTRAVENOUS at 16:54

## 2020-03-30 RX ADMIN — OLANZAPINE 5 MG: 10 INJECTION, POWDER, FOR SOLUTION INTRAMUSCULAR at 16:55

## 2020-03-30 ASSESSMENT — ENCOUNTER SYMPTOMS
COUGH: 1
MYALGIAS: 1
VOMITING: 1
HEADACHES: 1
SORE THROAT: 1
SHORTNESS OF BREATH: 1

## 2020-03-30 NOTE — ED NOTES
Bed: ED  Expected date: 3/30/20  Expected time:   Means of arrival: Ambulance  Comments:  A596 SOB/ cough 46 yo F

## 2020-03-30 NOTE — ED PROVIDER NOTES
History     Chief Complaint:  Cough and Shortness of Breath       HPI   Iva Bowman is a 45 year old female who presents to the emergency department for evaluation of cough and shortness of breath.  Symptoms first started 9 days ago.  Coughing to the point of throwing up.  Sore throat.  Migraine.  Body aches.  Tried OTC throat spray without improvement.  Lives with her pets.  No known COVID contacts.    Allergies:  Benzonatate  Haloperidol  Lorazepam  Droperidol  Levothyroxine  Phenothiazines  Prednisone  Aripiprazole  Buspirone  Ceclor   Compazine  Dihydroergotamine  Fluoxetine  Olanzapine  Phenergan   Reglan   Risperidone  Tessalon Perles   Cefaclor  Gabapentin  Gnp Allergy-Congestion Relief  Penicillins  Propranolol  Pseudoephedrine  Rizatriptan  Sumatriptan     Medications:    albuterol    aspirin-acetaminophen-caffeine   cholecalciferol   fluticasone  galcanezumab-gnlm   Keppra  levothyroxine   lisinopril   Provera  naproxen   ondansetron   pantoprazole  polyethylene glycol  vortioxetine     Past Medical History:    Anemia  Anxiety  Arrhythmia  Depression  Fibromyalgia  GERD  Herpes simplex  Hypertension  Hypothyroidism  IBS  Migraine  Seizures    Past Surgical History:    Arthroscopy ankle  Cardiac ablation  Cholecystectomy, laparoscopic  Cryotherapy, cervical  Dilation and curettage, operative hysteroscopy with morcellator, combined  Foot surgery  Heel ulcer treatment  PE tubes  Retrocalcaneal bursitis and Sarah deformity treatment  Tonsillectomy    Family History:    Sjogrens  Obesity  Depression  Lipids  Bipolar disorder    Social History:  The patient presents today alone.  Smoking Status: Former Smoker  Smokeless Tobacco: Never Used  Alcohol Use: No  Drug Use: No  PCP: Tehresa Hall      Review of Systems   HENT: Positive for sore throat.    Respiratory: Positive for cough and shortness of breath.    Gastrointestinal: Positive for vomiting.   Musculoskeletal: Positive for myalgias.    Neurological: Positive for headaches.   All other systems reviewed and are negative.    Physical Exam     Patient Vitals for the past 24 hrs:   BP Temp Temp src Pulse Resp SpO2   03/30/20 1607 (!) 146/100 98.9  F (37.2  C) Oral 92 20 98 %     Physical Exam  Eyes:  Sclera white; Pupils are equal and round  ENT:    External ears and nares normal, uvula midline  CV:  Rate as above with regular rhythm   Resp:  Breath sounds clear and equal bilaterally, frequent dry coughing    Non-labored, no retractions or accessory muscle use  GI:  Abdomen is soft, non-tender, non-distended    No rebound tenderness or peritoneal features  MS:  Moves all extremities  Skin:  Warm and dry  Neuro:  Speech is normal and fluent. No apparent deficit.    Emergency Department Course     Interventions:  1654 Toradol 15 mg IM    1655 Zyprexa 5 mg IM    Emergency Department Course:    1605 Nursing notes and vitals reviewed.    1610 I performed an exam of the patient as documented above.     Findings and plan explained to the Patient. Patient discharged home with instructions regarding supportive care, medications, and reasons to return. The importance of close follow-up was reviewed. The patient was prescribed Robitussin and Zofran.    Impression & Plan     Medical Decision Making:  Iva Bowman is a 45 year old female who presents to the emergency department today for evaluation of a cough, shortness of breath, body aches, headache, and a consolation of symptoms. She has a care plan related to her migraines, however her visit today is most consistent with a viral respiratory illness and possibly COVID. Sats and work of breathing are normal. There are no focal lung abnormalities and Xray is not indicated. Oral pharyngeal exam shows no evidence of strep or peritonsillar abscess. Although she states she normally gets IV medications when she has a migraine. I told her IV fluids are counter indicated in possible COVID and therefore single dose  of medication was given IM. She will likely continue to have headaches throughout her viral course and symptomatic medications were prescribed.  Tried to fill them through our pharmacy but when we clarified the plan we were told they were sent to the discharge pharmacy which is now closed.  Reprinted for patient to fill on her own.    Discharge Diagnosis:    ICD-10-CM    1. Viral respiratory illness  J98.8     B97.89    2. Migraine without status migrainosus, not intractable, unspecified migraine type  G43.909    3. Advice Given About Covid-19 Virus Infection  Z71.89      Disposition:  The patient is discharged to home.     Discharge Medications:  Current Discharge Medication List      START taking these medications    Details   guaiFENesin-codeine (ROBITUSSIN AC) 100-10 MG/5ML solution Take 5-10 mLs by mouth every 4 hours as needed for cough  Qty: 120 mL, Refills: 0      ondansetron (ZOFRAN ODT) 4 MG ODT tab Take 1-2 tablets (4-8 mg) by mouth every 8 hours as needed for nausea  Qty: 12 tablet, Refills: 0           Scribe Disclosure:  I, Gibran Tang, am serving as a scribe at 4:06 PM on 3/30/2020 to document services personally performed by Stefanie Oh MD based on my observations and the provider's statements to me.      Stefanie Oh MD  03/30/20 6809

## 2020-03-30 NOTE — ED NOTES
Cab ride arranged through [employer] ([phone]) for transportation home.     RN notified of approx arrival time.     Cab will contact pt by phone when they arrive at ED entrance.      Rosa Guerra RN, LICSW, [facility]  RN Care Coordinator  [facility], Emergency Department  Phone  [phone]

## 2020-03-30 NOTE — ED AVS SNAPSHOT
[facility] Emergency Department  [address]  [facility] [address]  Phone:  [phone]  Fax:  [phone]                                    Iva Bowman   MRN: [id number]    Department:  [facility] Emergency Department   Date of Visit:  3/30/2020           After Visit Summary Signature Page    I have received my discharge instructions, and my questions have been answered. I have discussed any challenges I see with this plan with the nurse or doctor.    ..........................................................................................................................................  Patient/Patient Representative Signature      ..........................................................................................................................................  Patient Representative Print Name and Relationship to Patient    ..................................................               ................................................  Date                                   Time    ..........................................................................................................................................  Reviewed by Signature/Title    ...................................................              ..............................................  Date                                               Time          22EPIC Rev 08/18

## 2020-05-26 DIAGNOSIS — G40.109 PARTIAL EPILEPSY (H): ICD-10-CM

## 2020-05-28 RX ORDER — LEVETIRACETAM 1000 MG/1
TABLET ORAL
Qty: 186 TABLET | OUTPATIENT
Start: 2020-05-28

## 2020-06-03 ENCOUNTER — HOSPITAL ENCOUNTER (EMERGENCY)
Facility: CLINIC | Age: 45
Discharge: HOME | End: 2020-06-03
Attending: PHYSICIAN ASSISTANT | Admitting: PHYSICIAN ASSISTANT
Payer: MEDICARE

## 2020-06-03 ENCOUNTER — APPOINTMENT (OUTPATIENT)
Dept: CT IMAGING | Facility: CLINIC | Age: 45
End: 2020-06-03
Attending: PHYSICIAN ASSISTANT
Payer: MEDICARE

## 2020-06-03 VITALS
DIASTOLIC BLOOD PRESSURE: 118 MMHG | SYSTOLIC BLOOD PRESSURE: 160 MMHG | OXYGEN SATURATION: 99 % | RESPIRATION RATE: 22 BRPM | HEART RATE: 96 BPM | TEMPERATURE: 98.2 F

## 2020-06-03 DIAGNOSIS — N20.0 KIDNEY STONE: ICD-10-CM

## 2020-06-03 LAB
ALBUMIN SERPL-MCNC: 4.2 G/DL (ref 3.4–5)
ALBUMIN UR-MCNC: NEGATIVE MG/DL
ALP SERPL-CCNC: 122 U/L (ref 40–150)
ALT SERPL W P-5'-P-CCNC: 35 U/L (ref 0–50)
ANION GAP SERPL CALCULATED.3IONS-SCNC: 9 MMOL/L (ref 3–14)
APPEARANCE UR: CLEAR
AST SERPL W P-5'-P-CCNC: 23 U/L (ref 0–45)
B-HCG FREE SERPL-ACNC: <5 IU/L
BASOPHILS # BLD AUTO: 0.1 10E9/L (ref 0–0.2)
BASOPHILS NFR BLD AUTO: 0.7 %
BILIRUB SERPL-MCNC: 0.6 MG/DL (ref 0.2–1.3)
BILIRUB UR QL STRIP: NEGATIVE
BUN SERPL-MCNC: 12 MG/DL (ref 7–30)
CALCIUM SERPL-MCNC: 9.8 MG/DL (ref 8.5–10.1)
CHLORIDE SERPL-SCNC: 108 MMOL/L (ref 94–109)
CO2 SERPL-SCNC: 23 MMOL/L (ref 20–32)
COLOR UR AUTO: ABNORMAL
CREAT SERPL-MCNC: 0.86 MG/DL (ref 0.52–1.04)
DIFFERENTIAL METHOD BLD: ABNORMAL
EOSINOPHIL # BLD AUTO: 0.1 10E9/L (ref 0–0.7)
EOSINOPHIL NFR BLD AUTO: 1.2 %
ERYTHROCYTE [DISTWIDTH] IN BLOOD BY AUTOMATED COUNT: 11.8 % (ref 10–15)
GFR SERPL CREATININE-BSD FRML MDRD: 82 ML/MIN/{1.73_M2}
GLUCOSE SERPL-MCNC: 113 MG/DL (ref 70–99)
GLUCOSE UR STRIP-MCNC: NEGATIVE MG/DL
HCT VFR BLD AUTO: 49 % (ref 35–47)
HGB BLD-MCNC: 16 G/DL (ref 11.7–15.7)
HGB UR QL STRIP: ABNORMAL
IMM GRANULOCYTES # BLD: 0.1 10E9/L (ref 0–0.4)
IMM GRANULOCYTES NFR BLD: 0.6 %
KETONES UR STRIP-MCNC: ABNORMAL MG/DL
LACTATE BLD-SCNC: 2 MMOL/L (ref 0.7–2)
LEUKOCYTE ESTERASE UR QL STRIP: NEGATIVE
LIPASE SERPL-CCNC: 106 U/L (ref 73–393)
LYMPHOCYTES # BLD AUTO: 1.6 10E9/L (ref 0.8–5.3)
LYMPHOCYTES NFR BLD AUTO: 15.8 %
MCH RBC QN AUTO: 28.9 PG (ref 26.5–33)
MCHC RBC AUTO-ENTMCNC: 32.7 G/DL (ref 31.5–36.5)
MCV RBC AUTO: 88 FL (ref 78–100)
MONOCYTES # BLD AUTO: 0.7 10E9/L (ref 0–1.3)
MONOCYTES NFR BLD AUTO: 7.3 %
MUCOUS THREADS #/AREA URNS LPF: PRESENT /LPF
NEUTROPHILS # BLD AUTO: 7.5 10E9/L (ref 1.6–8.3)
NEUTROPHILS NFR BLD AUTO: 74.4 %
NITRATE UR QL: NEGATIVE
NRBC # BLD AUTO: 0 10*3/UL
NRBC BLD AUTO-RTO: 0 /100
PH UR STRIP: 5.5 PH (ref 5–7)
PLATELET # BLD AUTO: 290 10E9/L (ref 150–450)
POTASSIUM SERPL-SCNC: 3.6 MMOL/L (ref 3.4–5.3)
PROT SERPL-MCNC: 8.1 G/DL (ref 6.8–8.8)
RBC # BLD AUTO: 5.54 10E12/L (ref 3.8–5.2)
RBC #/AREA URNS AUTO: 16 /HPF (ref 0–2)
SODIUM SERPL-SCNC: 140 MMOL/L (ref 133–144)
SOURCE: ABNORMAL
SP GR UR STRIP: 1.01 (ref 1–1.03)
SQUAMOUS #/AREA URNS AUTO: 1 /HPF (ref 0–1)
UROBILINOGEN UR STRIP-MCNC: NORMAL MG/DL (ref 0–2)
WBC # BLD AUTO: 10.1 10E9/L (ref 4–11)
WBC #/AREA URNS AUTO: 1 /HPF (ref 0–5)

## 2020-06-03 PROCEDURE — 84702 CHORIONIC GONADOTROPIN TEST: CPT

## 2020-06-03 PROCEDURE — 80053 COMPREHEN METABOLIC PANEL: CPT | Performed by: PHYSICIAN ASSISTANT

## 2020-06-03 PROCEDURE — 83605 ASSAY OF LACTIC ACID: CPT | Performed by: PHYSICIAN ASSISTANT

## 2020-06-03 PROCEDURE — 96375 TX/PRO/DX INJ NEW DRUG ADDON: CPT

## 2020-06-03 PROCEDURE — 25800030 ZZH RX IP 258 OP 636: Performed by: PHYSICIAN ASSISTANT

## 2020-06-03 PROCEDURE — 25000128 H RX IP 250 OP 636: Performed by: PHYSICIAN ASSISTANT

## 2020-06-03 PROCEDURE — 96374 THER/PROPH/DIAG INJ IV PUSH: CPT

## 2020-06-03 PROCEDURE — 83690 ASSAY OF LIPASE: CPT | Performed by: PHYSICIAN ASSISTANT

## 2020-06-03 PROCEDURE — 25000125 ZZHC RX 250: Performed by: PHYSICIAN ASSISTANT

## 2020-06-03 PROCEDURE — 85025 COMPLETE CBC W/AUTO DIFF WBC: CPT | Performed by: PHYSICIAN ASSISTANT

## 2020-06-03 PROCEDURE — 74177 CT ABD & PELVIS W/CONTRAST: CPT | Mod: 59

## 2020-06-03 PROCEDURE — 96361 HYDRATE IV INFUSION ADD-ON: CPT

## 2020-06-03 PROCEDURE — 96376 TX/PRO/DX INJ SAME DRUG ADON: CPT

## 2020-06-03 PROCEDURE — 99285 EMERGENCY DEPT VISIT HI MDM: CPT | Mod: 25

## 2020-06-03 PROCEDURE — 81001 URINALYSIS AUTO W/SCOPE: CPT | Performed by: PHYSICIAN ASSISTANT

## 2020-06-03 RX ORDER — IOPAMIDOL 755 MG/ML
500 INJECTION, SOLUTION INTRAVASCULAR ONCE
Status: COMPLETED | OUTPATIENT
Start: 2020-06-03 | End: 2020-06-03

## 2020-06-03 RX ORDER — KETOROLAC TROMETHAMINE 15 MG/ML
15 INJECTION, SOLUTION INTRAMUSCULAR; INTRAVENOUS ONCE
Status: COMPLETED | OUTPATIENT
Start: 2020-06-03 | End: 2020-06-03

## 2020-06-03 RX ORDER — TAMSULOSIN HYDROCHLORIDE 0.4 MG/1
0.4 CAPSULE ORAL DAILY
Qty: 10 CAPSULE | Refills: 0 | Status: SHIPPED | OUTPATIENT
Start: 2020-06-03 | End: 2020-06-13

## 2020-06-03 RX ORDER — HYDROMORPHONE HYDROCHLORIDE 1 MG/ML
0.5 INJECTION, SOLUTION INTRAMUSCULAR; INTRAVENOUS; SUBCUTANEOUS
Status: COMPLETED | OUTPATIENT
Start: 2020-06-03 | End: 2020-06-03

## 2020-06-03 RX ORDER — DIMENHYDRINATE 50 MG
50 TABLET ORAL EVERY 6 HOURS PRN
Qty: 16 TABLET | Refills: 0 | Status: SHIPPED | OUTPATIENT
Start: 2020-06-03

## 2020-06-03 RX ORDER — ONDANSETRON 2 MG/ML
4 INJECTION INTRAMUSCULAR; INTRAVENOUS EVERY 30 MIN PRN
Status: DISCONTINUED | OUTPATIENT
Start: 2020-06-03 | End: 2020-06-03 | Stop reason: HOSPADM

## 2020-06-03 RX ORDER — SODIUM CHLORIDE 9 MG/ML
1000 INJECTION, SOLUTION INTRAVENOUS CONTINUOUS
Status: DISCONTINUED | OUTPATIENT
Start: 2020-06-03 | End: 2020-06-03 | Stop reason: HOSPADM

## 2020-06-03 RX ORDER — ONDANSETRON 4 MG/1
4 TABLET, ORALLY DISINTEGRATING ORAL EVERY 8 HOURS PRN
Qty: 15 TABLET | Refills: 0 | Status: SHIPPED | OUTPATIENT
Start: 2020-06-03 | End: 2020-06-06

## 2020-06-03 RX ORDER — HYDROCODONE BITARTRATE AND ACETAMINOPHEN 5; 325 MG/1; MG/1
1 TABLET ORAL EVERY 6 HOURS PRN
Qty: 15 TABLET | Refills: 0 | Status: SHIPPED | OUTPATIENT
Start: 2020-06-03 | End: 2020-06-06

## 2020-06-03 RX ADMIN — HYDROMORPHONE HYDROCHLORIDE 0.5 MG: 1 INJECTION, SOLUTION INTRAMUSCULAR; INTRAVENOUS; SUBCUTANEOUS at 12:14

## 2020-06-03 RX ADMIN — SODIUM CHLORIDE 1000 ML: 9 INJECTION, SOLUTION INTRAVENOUS at 12:15

## 2020-06-03 RX ADMIN — HYDROMORPHONE HYDROCHLORIDE 0.5 MG: 1 INJECTION, SOLUTION INTRAMUSCULAR; INTRAVENOUS; SUBCUTANEOUS at 14:27

## 2020-06-03 RX ADMIN — ONDANSETRON 4 MG: 2 INJECTION INTRAMUSCULAR; INTRAVENOUS at 12:12

## 2020-06-03 RX ADMIN — ONDANSETRON 4 MG: 2 INJECTION INTRAMUSCULAR; INTRAVENOUS at 13:28

## 2020-06-03 RX ADMIN — IOPAMIDOL 100 ML: 755 INJECTION, SOLUTION INTRAVENOUS at 13:03

## 2020-06-03 RX ADMIN — KETOROLAC TROMETHAMINE 15 MG: 15 INJECTION, SOLUTION INTRAMUSCULAR; INTRAVENOUS at 13:38

## 2020-06-03 RX ADMIN — SODIUM CHLORIDE 65 ML: 9 INJECTION, SOLUTION INTRAVENOUS at 13:03

## 2020-06-03 ASSESSMENT — ENCOUNTER SYMPTOMS
RHINORRHEA: 0
MYALGIAS: 1
ABDOMINAL PAIN: 1
DYSURIA: 0
CHILLS: 0
VOMITING: 1
FEVER: 0
BLOOD IN STOOL: 0
FREQUENCY: 0
FATIGUE: 1
NAUSEA: 1
COUGH: 1
SORE THROAT: 0
APPETITE CHANGE: 1

## 2020-06-03 NOTE — ED TRIAGE NOTES
"Presents to ED with left-sided abdominal pain that radiates to right side of stomach and into left side of back. Pt was at home and vomited twice prior to EMS arrival. Pt denies diarrhea, last BM this AM and has been hard \"stones\". Pt has hx of kidney stones. Pt also c/o cough, sore throat for the past month. Pt has had 2 COVID tests, both of which were negative. Pt denies urinary symptoms. No chance of pregnancy, but states she does have some pelvic pain. ABCs intact. Pt rates pain 9/10. A&OX4.    "

## 2020-06-03 NOTE — ED NOTES
Pt notes 5-6 hive-like bumps above IV site. Pt denies itching, throat swelling, difficulty breathing. Pt instructed to return to ED if she experiences worsening allergic reaction.

## 2020-06-03 NOTE — ED AVS SNAPSHOT
[facility] Emergency Department  [address]  [facility] [address]  Phone:  [phone]  Fax:  [phone]                                    Iva Bowman   MRN: [id number]    Department:  [facility] Emergency Department   Date of Visit:  6/3/2020           After Visit Summary Signature Page    I have received my discharge instructions, and my questions have been answered. I have discussed any challenges I see with this plan with the nurse or doctor.    ..........................................................................................................................................  Patient/Patient Representative Signature      ..........................................................................................................................................  Patient Representative Print Name and Relationship to Patient    ..................................................               ................................................  Date                                   Time    ..........................................................................................................................................  Reviewed by Signature/Title    ...................................................              ..............................................  Date                                               Time          22EPIC Rev 08/18

## 2020-06-03 NOTE — ED NOTES
Bed: ED09  Expected date: 6/3/20  Expected time: 11:35 AM  Means of arrival: Ambulance  Comments:  Renée 594 [id number] abd pain

## 2020-06-03 NOTE — ED PROVIDER NOTES
History     Chief Complaint:  Abdominal Pain      HPI   Iva Bowman is a 45 year old female with a history of GERD, HTN, and IBS who presents for evaluation of abdominal pain. Patient states she woke up at 8am this morning with sharp diffuse epigastric pain that radiates to the left side of her back and has been constant since onset. She states the pain is a 9/10 and has associated nausea and vomiting. She has tried taking two Tums, heat packs, and Miralax to no relief. EMS also gave her Zofran to no relief. She also reports pelvic pain for a week, cough, lack of appetite, body aches, fatigue, and urinary incontinence. She denies vaginal bleeding or discharge, dysuria, frequency, urgency, black/bloody stools, fevers, chills, sore throat, congestion, and rhinorrhea. She denies concern for STI. She denies abdominal surgeries. She denies possibility of pregnancy.     Allergies:  Benzonatate  Haloperidol  Lorazepam  Droperidol  Levothyroxine  Phenothiazines  Prednisone  Aripiprazole  Buspirone  Ceclor   Compazine  Dihydroergotamine  Fluoxetine  Olanzapine  Phenergan   Reglan   Risperidone  Tessalon Perles   Cefaclor  Gabapentin  Gnp Allergy-Congestion Relief  Penicillins  Propranolol  Pseudoephedrine  Rizatriptan  Sumatriptan      Medications:    Albuterol  Excedrin migraine  Cholecalciferol  Fluticasone  Emgality  Robitussin AC  Keppra  Levothyroxine  Lisinopril  Provera  Naproxen  Zofran  Protonix  Miralax  Trintellix     Past Medical History:    Anemia  Anxiety  Paroxysmal SVT  External hemorrhoids  Gottron's papules  Borderline personality disorder  Depression  Fibromyalgia  GERD  Herpes simplex  Hypertension  Hypothyroidism  IBS  Migraine  Seizures     Past Surgical History:    Right ankle arthroscopy  Cholecystectomy  Cervical cryotherapy  Dilation and curettage  Foot surgery  Heel ulcer treatment  PE tubes  Retrocalcaneal bursitis and Sarah deformity treatment  Tonsillectomy    Family History:     Sjogrens - mother  Obesity- mother  Depression - mother  Lipids - father    Social History:  Smoking status: former smoker  Alcohol use: no  Drug use: no  The patient presents to the emergency department via EMS.  PCP: Theresa Hall  Marital Status:       Review of Systems   Constitutional: Positive for appetite change and fatigue. Negative for chills and fever.   HENT: Negative for congestion, rhinorrhea and sore throat.    Respiratory: Positive for cough.    Gastrointestinal: Positive for abdominal pain, nausea and vomiting. Negative for blood in stool.   Genitourinary: Positive for enuresis and pelvic pain. Negative for dysuria, frequency, urgency, vaginal bleeding and vaginal discharge.   Musculoskeletal: Positive for myalgias.   All other systems reviewed and are negative.    Physical Exam     Patient Vitals for the past 24 hrs:   BP Temp Temp src Pulse Heart Rate Resp SpO2   06/03/20 1345 (!) 160/118 -- -- -- -- -- 99 %   06/03/20 1330 -- -- -- -- -- -- 98 %   06/03/20 1315 (!) 172/108 -- -- 96 -- -- 95 %   06/03/20 1151 (!) 161/97 98.2  F (36.8  C) Oral 83 83 22 99 %     Physical Exam  Constitutional: Pleasant. Cooperative.   Eyes: Pupils equally round and reactive  HENT: Head is normal in appearance. Oropharynx is normal with moist mucus membranes.  Cardiovascular: Regular rate and rhythm and without murmurs.  Respiratory: Normal respiratory effort, lungs are clear bilaterally.  GI: Diffuse TTP. Otherwise soft, non-distended. No guarding, rebound, or rigidity.  Musculoskeletal: No asymmetry of the lower extremities, no tenderness to palpation. No CVA TTP.  Skin: Normal, without rash. Diaphoretic.  Neurologic: Cranial nerves grossly intact, normal cognition, no focal deficits. Alert and oriented x 3.   Psychiatric: Normal affect.  Nursing notes and vital signs reviewed.    Emergency Department Course   Imaging:  Radiographic findings were communicated with the patient who voiced understanding of the  findings.  CT Abdomen pelvis with contrast:   IMPRESSION:   1. 5 mm stone in the proximal left ureter with associated mild   hydroureter/hydronephrosis. Few other bilateral kidney stones measure   up to 4 mm at the interpolar region of the right kidney. No right   hydronephrosis.   2. Small hiatal hernia. as per radiology.    Laboratory:  CBC: WBC: 10.1, HGB: 16.0 (H), PLT: 290  CMP: Glucose 113 (H), o/w WNL (Creatinine: 0.86)  1221 Lactic acid: 2.0  Lipase: 106  ISTAT HCG quantitative pregnancy POCT: <5.0  UA: Ketones trace, Blood moderate, RBC 16 (H), Mucous present, o/w Negative    Interventions:  1212 Zofran 4 mg IV  1214 Dilaudid 0.5 mg IV  1215 NS 1000 mL IV  1328 Zofran 4 mg IV  1338 Toradol 15 mg IV    Emergency Department Course:  Nursing notes and vitals reviewed. (1204) I performed an exam of the patient as documented above.     IV inserted. Medicine administered as documented above. Blood drawn. Urine sample obtained. This was sent to the lab for further testing, results above.     The patient was sent for a CT while in the emergency department, findings above.     1425 I rechecked the patient and discussed the results of her workup thus far.     Findings and plan explained to the Patient. Patient discharged home with instructions regarding supportive care, medications, and reasons to return. The importance of close follow-up was reviewed. The patient was prescribed Dramamine, Norco, Zofran odt, and Flomax.    I personally reviewed the laboratory results with the Patient and answered all related questions prior to discharge.      Impression & Plan    Medical Decision Making:  Iva Bowman is a 45 year old female who presents to the ED for evaluation of diffuse abdominal pain and left-sided back pain that woke her up this morning around 0800.  She is a history of kidney stones.  See HPI as above for additional details.  Vitals and physical exam as above differential is broad and included GERD,  pancreatitis, hepatitis, perforated viscus, SBO, gastroenteritis, kidney stone, pyelonephritis, UTI, uterine/ovarian pathology, constipation, among others.  Work-up is remarkable for 5 mm kidney stone.  Symptoms seem consistent with this.  Kidney function is WNL.  No evidence for UTI and therefore pyelonephritis at this time.  Patient provided the above interventions with significant improvement of her symptoms.  Discussed plan for discharge to home with patient, and patient was comfortable with this.  Discussed medications.  Discussed need for follow-up with urology, and urology follow-up information provided medications as below.   reviewed.  Discussed narcotic precautions.  All questions answered.  Patient discharged home in stable condition.    Diagnosis:    ICD-10-CM    1. Kidney stone  N20.0        Disposition:  discharged to home    Discharge Medications:  New Prescriptions    DIMENHYDRINATE (DRAMAMINE) 50 MG TABLET    Take 1 tablet (50 mg) by mouth every 6 hours as needed for sleep    HYDROCODONE-ACETAMINOPHEN (NORCO) 5-325 MG TABLET    Take 1 tablet by mouth every 6 hours as needed for severe pain    ONDANSETRON (ZOFRAN ODT) 4 MG ODT TAB    Take 1 tablet (4 mg) by mouth every 8 hours as needed for nausea    TAMSULOSIN (FLOMAX) 0.4 MG CAPSULE    Take 1 capsule (0.4 mg) by mouth daily for 10 doses       Flavio Pitt  6/3/2020   [facility] EMERGENCY DEPARTMENT  Scribe Disclosure:  I, Flavio Pitt, am serving as a scribe at 12:04 PM on 6/3/2020 to document services personally performed by Bharathi Bentley PA-C based on my observations and the provider's statements to me.        Bharathi Bentley PA-C  06/03/20 [id number]

## 2020-06-09 DIAGNOSIS — G40.109 PARTIAL EPILEPSY (H): ICD-10-CM

## 2020-06-11 RX ORDER — LEVETIRACETAM 1000 MG/1
TABLET ORAL
Qty: 186 TABLET | OUTPATIENT
Start: 2020-06-11

## 2020-06-28 ENCOUNTER — APPOINTMENT (OUTPATIENT)
Dept: GENERAL RADIOLOGY | Facility: CLINIC | Age: 45
End: 2020-06-28
Attending: EMERGENCY MEDICINE
Payer: MEDICARE

## 2020-06-28 ENCOUNTER — HOSPITAL ENCOUNTER (EMERGENCY)
Facility: CLINIC | Age: 45
Discharge: HOME | End: 2020-06-29
Attending: EMERGENCY MEDICINE | Admitting: EMERGENCY MEDICINE
Payer: MEDICARE

## 2020-06-28 DIAGNOSIS — F39 MOOD DISORDER (H): ICD-10-CM

## 2020-06-28 DIAGNOSIS — R51.9 NONINTRACTABLE HEADACHE, UNSPECIFIED CHRONICITY PATTERN, UNSPECIFIED HEADACHE TYPE: ICD-10-CM

## 2020-06-28 DIAGNOSIS — R05.9 COUGH: ICD-10-CM

## 2020-06-28 LAB
ANION GAP SERPL CALCULATED.3IONS-SCNC: 7 MMOL/L (ref 3–14)
BASOPHILS # BLD AUTO: 0.1 10E9/L (ref 0–0.2)
BASOPHILS NFR BLD AUTO: 0.7 %
BUN SERPL-MCNC: 10 MG/DL (ref 7–30)
CALCIUM SERPL-MCNC: 8.8 MG/DL (ref 8.5–10.1)
CHLORIDE SERPL-SCNC: 109 MMOL/L (ref 94–109)
CO2 SERPL-SCNC: 23 MMOL/L (ref 20–32)
CREAT SERPL-MCNC: 0.61 MG/DL (ref 0.52–1.04)
DIFFERENTIAL METHOD BLD: ABNORMAL
EOSINOPHIL # BLD AUTO: 0.1 10E9/L (ref 0–0.7)
EOSINOPHIL NFR BLD AUTO: 1.4 %
ERYTHROCYTE [DISTWIDTH] IN BLOOD BY AUTOMATED COUNT: 12.2 % (ref 10–15)
GFR SERPL CREATININE-BSD FRML MDRD: >90 ML/MIN/{1.73_M2}
GLUCOSE SERPL-MCNC: 105 MG/DL (ref 70–99)
HCG SERPL QL: NEGATIVE
HCT VFR BLD AUTO: 48.8 % (ref 35–47)
HGB BLD-MCNC: 16 G/DL (ref 11.7–15.7)
IMM GRANULOCYTES # BLD: 0.1 10E9/L (ref 0–0.4)
IMM GRANULOCYTES NFR BLD: 1 %
LYMPHOCYTES # BLD AUTO: 2 10E9/L (ref 0.8–5.3)
LYMPHOCYTES NFR BLD AUTO: 19.4 %
MCH RBC QN AUTO: 29.3 PG (ref 26.5–33)
MCHC RBC AUTO-ENTMCNC: 32.8 G/DL (ref 31.5–36.5)
MCV RBC AUTO: 89 FL (ref 78–100)
MONOCYTES # BLD AUTO: 1.1 10E9/L (ref 0–1.3)
MONOCYTES NFR BLD AUTO: 10.6 %
NEUTROPHILS # BLD AUTO: 6.8 10E9/L (ref 1.6–8.3)
NEUTROPHILS NFR BLD AUTO: 66.9 %
NRBC # BLD AUTO: 0 10*3/UL
NRBC BLD AUTO-RTO: 0 /100
PLATELET # BLD AUTO: 250 10E9/L (ref 150–450)
POTASSIUM SERPL-SCNC: 3.7 MMOL/L (ref 3.4–5.3)
RBC # BLD AUTO: 5.46 10E12/L (ref 3.8–5.2)
SODIUM SERPL-SCNC: 139 MMOL/L (ref 133–144)
WBC # BLD AUTO: 10.2 10E9/L (ref 4–11)

## 2020-06-28 PROCEDURE — 80048 BASIC METABOLIC PNL TOTAL CA: CPT | Performed by: EMERGENCY MEDICINE

## 2020-06-28 PROCEDURE — U0003 INFECTIOUS AGENT DETECTION BY NUCLEIC ACID (DNA OR RNA); SEVERE ACUTE RESPIRATORY SYNDROME CORONAVIRUS 2 (SARS-COV-2) (CORONAVIRUS DISEASE [COVID-19]), AMPLIFIED PROBE TECHNIQUE, MAKING USE OF HIGH THROUGHPUT TECHNOLOGIES AS DESCRIBED BY CMS-2020-01-R: HCPCS | Performed by: EMERGENCY MEDICINE

## 2020-06-28 PROCEDURE — 84703 CHORIONIC GONADOTROPIN ASSAY: CPT | Performed by: EMERGENCY MEDICINE

## 2020-06-28 PROCEDURE — 99285 EMERGENCY DEPT VISIT HI MDM: CPT | Mod: 25

## 2020-06-28 PROCEDURE — 25800030 ZZH RX IP 258 OP 636: Performed by: EMERGENCY MEDICINE

## 2020-06-28 PROCEDURE — 90791 PSYCH DIAGNOSTIC EVALUATION: CPT

## 2020-06-28 PROCEDURE — 96361 HYDRATE IV INFUSION ADD-ON: CPT

## 2020-06-28 PROCEDURE — 96374 THER/PROPH/DIAG INJ IV PUSH: CPT

## 2020-06-28 PROCEDURE — 71045 X-RAY EXAM CHEST 1 VIEW: CPT

## 2020-06-28 PROCEDURE — 96375 TX/PRO/DX INJ NEW DRUG ADDON: CPT

## 2020-06-28 PROCEDURE — 85025 COMPLETE CBC W/AUTO DIFF WBC: CPT | Performed by: EMERGENCY MEDICINE

## 2020-06-28 PROCEDURE — 25000128 H RX IP 250 OP 636: Performed by: EMERGENCY MEDICINE

## 2020-06-28 RX ORDER — DIPHENHYDRAMINE HYDROCHLORIDE 50 MG/ML
25 INJECTION INTRAMUSCULAR; INTRAVENOUS ONCE
Status: COMPLETED | OUTPATIENT
Start: 2020-06-28 | End: 2020-06-28

## 2020-06-28 RX ORDER — ONDANSETRON 2 MG/ML
4 INJECTION INTRAMUSCULAR; INTRAVENOUS ONCE
Status: COMPLETED | OUTPATIENT
Start: 2020-06-28 | End: 2020-06-28

## 2020-06-28 RX ORDER — KETOROLAC TROMETHAMINE 15 MG/ML
15 INJECTION, SOLUTION INTRAMUSCULAR; INTRAVENOUS ONCE
Status: COMPLETED | OUTPATIENT
Start: 2020-06-28 | End: 2020-06-28

## 2020-06-28 RX ADMIN — ONDANSETRON 4 MG: 2 INJECTION INTRAMUSCULAR; INTRAVENOUS at 20:59

## 2020-06-28 RX ADMIN — SODIUM CHLORIDE 1000 ML: 9 INJECTION, SOLUTION INTRAVENOUS at 20:59

## 2020-06-28 RX ADMIN — DIPHENHYDRAMINE HYDROCHLORIDE 25 MG: 50 INJECTION, SOLUTION INTRAMUSCULAR; INTRAVENOUS at 20:59

## 2020-06-28 RX ADMIN — KETOROLAC TROMETHAMINE 15 MG: 15 INJECTION, SOLUTION INTRAMUSCULAR; INTRAVENOUS at 20:59

## 2020-06-28 ASSESSMENT — ENCOUNTER SYMPTOMS
COUGH: 1
RHINORRHEA: 1
ABDOMINAL PAIN: 0
NUMBNESS: 0
NAUSEA: 1
FATIGUE: 1
VOMITING: 1
HEADACHES: 1
DIARRHEA: 0
WEAKNESS: 0
CHILLS: 1
SHORTNESS OF BREATH: 0

## 2020-06-28 NOTE — LETTER
June 30, 2020        Iva Bowman  [address]   [place] [facility] [address]    This letter provides a written record that you were tested for COVID-19 on 6/28/20.       Your result was negative. This means that we didn t find the virus that causes COVID-19 in your sample. A test may show negative when you do actually have the virus. This can happen when the virus is in the early stages of infection, before you feel illness symptoms.    If you have symptoms   Stay home and away from others (self-isolate) until you meet ALL of the guidelines below:    You ve had no fever--and no medicine that reduces fever--for 3 full days (72 hours). And      Your other symptoms have gotten better. For example, your cough or breathing has improved. And     At least 10 days have passed since your symptoms started.    During this time:    Stay home. Don t go to work, school or anywhere else.     Stay in your own room, including for meals. Use your own bathroom if you can.    Stay away from others in your home. No hugging, kissing or shaking hands. No visitors.    Clean  high touch  surfaces often (doorknobs, counters, handles, etc.). Use a household cleaning spray or wipes. You can find a full list on the EPA website at www.epa.gov/pesticide-registration/list-n-disinfectants-use-against-sars-cov-2.    Cover your mouth and nose with a mask, tissue or washcloth to avoid spreading germs.    Wash your hands and face often with soap and water.    Going back to work  Check with your employer for any guidelines to follow for going back to work.    Employers: This document serves as formal notice that your employee tested negative for COVID-19, as of the testing date shown above.

## 2020-06-29 VITALS
DIASTOLIC BLOOD PRESSURE: 100 MMHG | SYSTOLIC BLOOD PRESSURE: 155 MMHG | HEART RATE: 71 BPM | OXYGEN SATURATION: 100 % | TEMPERATURE: 98.8 F | BODY MASS INDEX: 34.33 KG/M2 | RESPIRATION RATE: 16 BRPM | WEIGHT: 200 LBS

## 2020-06-29 LAB
SARS-COV-2 RNA SPEC QL NAA+PROBE: NOT DETECTED
SPECIMEN SOURCE: NORMAL

## 2020-06-29 NOTE — ED TRIAGE NOTES
Patient arrived by EMS from home.  Patient reports feeling depressed and has had a migraine for over a week.

## 2020-06-29 NOTE — ED PROVIDER NOTES
History     Chief Complaint:  Headache      HPI   Iva Bowman is a 45 year old female with a history of hypertension, migraines, depression, somatoform disorder and anxiety who presents with her typical migraine headache that has been persistent since 6/20. Patient was seen at [facility] for this on 6/23 and was discharged improved after Toradol, Benadryl, IVF, and IV Zyprexa as noted in her chronic migraine treatment plan. She returns to the ED as she has not been able to control this with home meds. She also reports a number of other symptoms including chills, nausea, dry heaves and cough.  She denies any significant abdominal pain, paresthesias, focal weakness, neck pain, abdominal pain.  The patient reports she has had 4 COVID-19 tests in the past month; Patient had a negative COVID test yesterday.    MRI OF THE BRAIN WITHOUT AND WITH CONTRAST 4/15/2019 3:42 PM   Nonspecific cerebral white matter changes with  differential as above. Otherwise, normal brain MRI. No evidence for  acute intracranial pathology. No cortical abnormality is identified to  suggest a seizure focus.     Allergies:  Benzonatate  Haloperidol  Lorazepam  Droperidol  Levothyroxine  Phenothiazines  Prednisone  Aripiprazole  Buspirone  Ceclor [Cephalosporins]  Compazine  Dihydroergotamine  Fluoxetine  Olanzapine  Phenergan [Promethazine Hcl]  Reglan [Metoclopramide]  Risperidone  Tessalon Perles [Benzonatate]  Cefaclor  Gabapentin  Gnp Allergy-Congestion Relief  No Clinical Screening - See Comments  Penicillins  Propranolol  Pseudoephedrine  Rizatriptan  Sumatriptan     Medications:    Albuterol inhaler  Emgality  Keppra  Levothyroxine  Lisinopril  Provera   Protonix  Trintellix     Past Medical History:    Anemia  Anxiety  Arrhythmia  Depression  Fibromyalgia  GERD  Herpes simplex  Hypertension  Hypothyroidism  IBS  Migraine  Seizures   Cervicalgia  Urinary incontinence  Gottron's papules  PTSD  Undifferentiated somatoform  disorder  Iron deficiency anemia  Borderline personality disorder  Kidney stone  Conduct disorder  Uterine disorder  Drug overdose, multiple drugs   Prediabetes    Past Surgical History:    Cardiac ablation  Cholecystectomy  Cervical cryotherapy  D & C   Foot surgery  Heel ulcer treatment  PE tubes   Retrocalcaneal bursitis and Sarah deformity treatment  Tonsillectomy      Family History:    Sjogren's disease  Obesity  Depression  Lipids  Heart disease  Osteoporosis  Cornea surgery  Pancreatic cancer  Neurologic disorder  Bipolar disorder    Social History:  Smoking status: Former  Alcohol use: No  Drug use: No  Patient presents alone.  PCP: Theresa Hall    Marital Status:      Review of Systems   Constitutional: Positive for chills and fatigue.   HENT: Positive for rhinorrhea.    Respiratory: Positive for cough. Negative for shortness of breath.    Gastrointestinal: Positive for nausea and vomiting. Negative for abdominal pain and diarrhea.   Neurological: Positive for headaches. Negative for weakness and numbness.   All other systems reviewed and are negative.      Physical Exam     Patient Vitals for the past 24 hrs:   BP Temp Temp src Heart Rate Resp SpO2 Weight   06/28/20 2018 (!) 126/97 98.8  F (37.1  C) Oral 100 17 97 % 90.7 kg (200 lb)     Physical Exam  General: Well-nourished, nontoxic  Eyes: PERRL, EOMI, no nystagmus.  No scleral icterus or conjunctival injection.    ENT:  Moist mucus membranes, posterior oropharynx clear without erythema or exudates. TM normal bilaterally  Neck: Supple with full range of motion  Respiratory:  Lungs clear to auscultation bilaterally, no crackles/rubs/wheezes.  Good air movement  CV: Normal rate and rhythm, no murmurs/rubs/gallops  GI:  Abdomen soft and non-distended.  No tenderness, guarding or rebound  Skin: Warm, dry.  No rashes or petechiae  MSK: No peripheral edema or calf tenderness  Neuro: Alert and oriented to person/place/time.  No aphasia/facial  droop/dysarthria.  Tongue midline, normal strength at SCM/trapezius/BUE/BLE.  Normal finger to nose. Normal gait.  Negative romberg, sensation intact over face/BUE/BLE  Psychiatric: Anxious, denies suicidal ideations    Emergency Department Course     Imaging:  Radiology findings were communicated with the oncoming provider who voiced understanding of the findings.    X-ray Portable Chest, 1 views:  No acute change. Lungs clear.   Result per radiology.      Laboratory:  Laboratory findings were communicated with the oncoming provider who voiced understanding of the findings.    CBC: WBC 10.2, HGB 16.0 (H),    BMP: Glucose 105 (H), o/w WNL (Creatinine 0.61)  HCG: Negative    Symptomatic COVID-19 Virus by PCR NP swab: Pending     Interventions:  2059 - NS 1 L IV Bolus   2059 - Toradol 15 mg IV  2059 - Zofran 4 mg IV  2059 - Benadryl 25 mg IV     Emergency Department Course:  The patient arrived in the emergency department via EMS.     Past medical records, nursing notes, and vitals reviewed.  2040: I performed an exam of the patient and obtained history, as documented above.    IV inserted and blood drawn. This was sent to laboratory for testing, findings above. Portable chest x-ray was taken in the ED, findings above.      Patient will be seen by DEC    The patient was signed out to Dr. Ferrell, [facility] ED physician, pending DEC evaluation.     Impression & Plan     Medical Decision Making:  Patient is a 45-year-old female presenting with myriad of complaints though predominantly headaches.  Over the past week she has had multiple ED visits.  She has been tested for COVID over 4 times this month.  She is nontoxic and neurologically intact.  I do not feel that she needs emergent neuroimaging at this time. She is without meningeal signs.  The headache has been gradual, I doubt SAH.  She states that her headache feels similar to previous headaches but is just lasting longer in duration. She reported symptom  "improvement during her time in the ED. Her labs are reassuring today.   She reported a number of other symptoms including cough, chills, nausea.  She did undergo formal chest x-ray given her reported cough though this was unremarkable for pneumonia or other acute process.  She was tested for COVID 19 yesterday and this was negative.  She has a benign abdominal exam and I doubt intraabdominal catastrophe.  I do not feel further emergent testing or imaging is needed for today's visit.   I did offer neurology referral in setting of her headaches though patient states \"I have given up on neurologists.  I follow with my chronic pain specialist.\"    The patient reported increasing depression during my interview and is requesting formal DEC evaluation.  She has an extensive mental health history.  She denies any suicidal ideations and does not appear psychotic.  This evaluation will be signed out to my partner Dr. Evans pending completion.  I feel that she will be stable for outpatient follow-up pending this.     Diagnosis:    ICD-10-CM    1. Nonintractable headache, unspecified chronicity pattern, unspecified headache type  R51 Symptomatic COVID-19 Virus (Coronavirus) by PCR   2. Cough  R05    3. Mood disorder (H)  F39      Disposition:  Signed out to Dr. Ferrell, pending DEC.    Scribe Disclosure:  I, Garland Patel, am serving as a scribe at 8:21 PM on 6/28/2020 to document services personally performed by Susan Lopez DO based on my observations and the provider's statements to me.      Garland Patel   6/28/2020   [facility] EMERGENCY DEPARTMENT     Susan Lopez DO  06/28/20 2348    "

## 2020-07-06 ENCOUNTER — APPOINTMENT (OUTPATIENT)
Dept: GENERAL RADIOLOGY | Facility: CLINIC | Age: 45
End: 2020-07-06
Attending: EMERGENCY MEDICINE
Payer: MEDICARE

## 2020-07-06 ENCOUNTER — HOSPITAL ENCOUNTER (EMERGENCY)
Facility: CLINIC | Age: 45
Discharge: HOME | End: 2020-07-06
Attending: EMERGENCY MEDICINE | Admitting: EMERGENCY MEDICINE
Payer: MEDICARE

## 2020-07-06 VITALS
RESPIRATION RATE: 18 BRPM | HEART RATE: 82 BPM | OXYGEN SATURATION: 97 % | DIASTOLIC BLOOD PRESSURE: 74 MMHG | TEMPERATURE: 99.4 F | SYSTOLIC BLOOD PRESSURE: 139 MMHG

## 2020-07-06 DIAGNOSIS — R05.9 COUGH: ICD-10-CM

## 2020-07-06 LAB
ANION GAP SERPL CALCULATED.3IONS-SCNC: 10 MMOL/L (ref 3–14)
BASOPHILS # BLD AUTO: 0.1 10E9/L (ref 0–0.2)
BASOPHILS NFR BLD AUTO: 0.7 %
BUN SERPL-MCNC: 9 MG/DL (ref 7–30)
CALCIUM SERPL-MCNC: 9 MG/DL (ref 8.5–10.1)
CHLORIDE SERPL-SCNC: 109 MMOL/L (ref 94–109)
CO2 SERPL-SCNC: 22 MMOL/L (ref 20–32)
CREAT SERPL-MCNC: 0.76 MG/DL (ref 0.52–1.04)
DIFFERENTIAL METHOD BLD: NORMAL
EOSINOPHIL # BLD AUTO: 0.1 10E9/L (ref 0–0.7)
EOSINOPHIL NFR BLD AUTO: 1.5 %
ERYTHROCYTE [DISTWIDTH] IN BLOOD BY AUTOMATED COUNT: 12.1 % (ref 10–15)
GFR SERPL CREATININE-BSD FRML MDRD: >90 ML/MIN/{1.73_M2}
GLUCOSE SERPL-MCNC: 105 MG/DL (ref 70–99)
HCT VFR BLD AUTO: 44.8 % (ref 35–47)
HGB BLD-MCNC: 14.7 G/DL (ref 11.7–15.7)
IMM GRANULOCYTES # BLD: 0.1 10E9/L (ref 0–0.4)
IMM GRANULOCYTES NFR BLD: 0.6 %
INTERPRETATION ECG - MUSE: NORMAL
LYMPHOCYTES # BLD AUTO: 1.6 10E9/L (ref 0.8–5.3)
LYMPHOCYTES NFR BLD AUTO: 19 %
MCH RBC QN AUTO: 29 PG (ref 26.5–33)
MCHC RBC AUTO-ENTMCNC: 32.8 G/DL (ref 31.5–36.5)
MCV RBC AUTO: 88 FL (ref 78–100)
MONOCYTES # BLD AUTO: 0.8 10E9/L (ref 0–1.3)
MONOCYTES NFR BLD AUTO: 9.7 %
NEUTROPHILS # BLD AUTO: 5.8 10E9/L (ref 1.6–8.3)
NEUTROPHILS NFR BLD AUTO: 68.5 %
NRBC # BLD AUTO: 0 10*3/UL
NRBC BLD AUTO-RTO: 0 /100
PLATELET # BLD AUTO: 260 10E9/L (ref 150–450)
POTASSIUM SERPL-SCNC: 3.3 MMOL/L (ref 3.4–5.3)
RBC # BLD AUTO: 5.07 10E12/L (ref 3.8–5.2)
SODIUM SERPL-SCNC: 141 MMOL/L (ref 133–144)
TROPONIN I SERPL-MCNC: <0.015 UG/L (ref 0–0.04)
WBC # BLD AUTO: 8.4 10E9/L (ref 4–11)

## 2020-07-06 PROCEDURE — 99285 EMERGENCY DEPT VISIT HI MDM: CPT | Mod: 25

## 2020-07-06 PROCEDURE — 85025 COMPLETE CBC W/AUTO DIFF WBC: CPT | Performed by: EMERGENCY MEDICINE

## 2020-07-06 PROCEDURE — 80048 BASIC METABOLIC PNL TOTAL CA: CPT | Performed by: EMERGENCY MEDICINE

## 2020-07-06 PROCEDURE — 84484 ASSAY OF TROPONIN QUANT: CPT | Performed by: EMERGENCY MEDICINE

## 2020-07-06 PROCEDURE — 93005 ELECTROCARDIOGRAM TRACING: CPT

## 2020-07-06 PROCEDURE — 71045 X-RAY EXAM CHEST 1 VIEW: CPT

## 2020-07-06 ASSESSMENT — ENCOUNTER SYMPTOMS
SHORTNESS OF BREATH: 1
COUGH: 1

## 2020-07-06 NOTE — ED PROVIDER NOTES
History   Chief Complaint  Cough    The history is provided by the patient.      Iva Bowman is a 45 year old female with a history of anemia, anxiety,  Depression, GERD, Fibromyalgia, hypertension, hypothyroidism, IBS, PTSD, and prediabetes who was BIBA for evaluation of cough which has been ongoing since December. Iva endorses left-sided chest pain and shortness of breath. Iva has 5 negative COVID tests. Iva denies any fevers. Patient was evaluated here last week Sunday. Iva lives alone. She endorse history of smoking, but is not currently. Patient is seen at [facility] by her primary medical provider.     Workup from 06/28/2020  X-ray Portable Chest, 1 views:   No acute change. Lungs clear.    Result per radiology.       Allergies  Benzonatate  Haloperidol  Lorazepam  Droperidol  Levothyroxine  Phenothiazines  Prednisone  Aripiprazole  Buspirone  Ceclor [Cephalosporins]  Compazine  Dihydroergotamine  Fluoxetine  Olanzapine  Phenergan [Promethazine Hcl]  Reglan [Metoclopramide]  Risperidone  Tessalon Perles [Benzonatate]  Cefaclor  Gabapentin  Gnp Allergy-Congestion Relief  No Clinical Screening - See Comments  Penicillins  Propranolol  Pseudoephedrine  Rizatriptan  Sumatriptan    Medications  Albuterol inhaler  Emgality  Keppra  Levothyroxine  Lisinopril  Provera   Protonix  Trintellix     Past Medical History  Anemia  Anxiety  Arrhythmia  Depression  Fibromyalgia  GERD  Herpes simplex  Hypertension  Hypothyroidism  IBS  Migraine  Seizures   Cervicalgia  Urinary incontinence  Gottron's papules  PTSD  Undifferentiated somatoform disorder  Iron deficiency anemia  Borderline personality disorder  Kidney stone  Conduct disorder  Uterine disorder  Drug overdose, multiple drugs   Prediabetes     Past Surgical History:    Cardiac ablation  Cholecystectomy  Cervical cryotherapy  D & C   Foot surgery  Heel ulcer treatment  PE tubes   Retrocalcaneal bursitis and Sarah deformity treatment  Tonsillectomy       Family History  Sjogren's disease  Obesity  Depression  Lipids  Heart disease  Osteoporosis  Cornea surgery  Pancreatic cancer  Neurologic disorder  Bipolar disorder    Social History  Smoking status: Former  Alcohol use: No  Drug use: No  Patient presents alone.  PCP: Theresa Hall    Marital Status:      Review of Systems   Respiratory: Positive for cough and shortness of breath.    Cardiovascular: Positive for chest pain (left sided chest pain).   All other systems reviewed and are negative.    Physical Exam     Patient Vitals for the past 24 hrs:   BP Temp Temp src Pulse Resp SpO2   07/06/20 1115 -- -- -- -- -- 96 %   07/06/20 1100 -- -- -- -- -- 98 %   07/06/20 1045 -- -- -- -- -- 96 %   07/06/20 1030 -- -- -- -- -- 99 %   07/06/20 1015 (!) 147/97 99.4  F (37.4  C) Oral 82 18 97 %     Physical Exam  General: Patient is alert and cooperative.  HENT:  Normal nose, oropharynx. Moist oral mucosa. Raspy voice.   Eyes: EOMI. Normal conjunctiva.  Neck:  Normal range of motion and appearance.   Cardiovascular:  Normal rate, regular rhythm and normal heart sounds.   Pulmonary/Chest:  Effort normal. No wheezing or crackles.  Abdominal: Soft. No distension or tenderness.     Musculoskeletal: Normal range of motion. No edema or tenderness.   Neurological: oriented, normal strength, sensation, and coordination.   Skin: Warm and dry. No rash or bruising.   Psychiatric: sober, no hallucinations.         Emergency Department Course   EKG  Indication: Chest pain  Time:1030  Rate 75 bpm. RI interval 142. QRS duration 84. QT/QTc 388/433.   Normal sinus rhythm  Normal ECG   No prior for comparison  Read time: 1033  Read by Adiel Beltran MD    Imaging:  Radiology findings were communicated with the patient who voiced understanding of the findings.    XR Chest Port 1 View  IMPRESSION: No acute cardiopulmonary disease.    Readings per Radiology    Laboratory:  Laboratory findings were communicated with the patient  who voiced understanding of the findings.    BMP: Glucose 105 (high), Potassium: 3.3 (low), o/w WNL (Creatinine: 0.76)  CBC: WBC: 8.4, HGB: 14.7, PLT: 260  Troponin I (Collected at 1023): <0.015    Emergency Department Course:  Past medical records, nursing notes, and vitals reviewed.    1019 I physically examined the patient as documented above.    EKG obtained in the ED, see results above.   IV was inserted and blood was drawn for laboratory testing, results above.  The patient was sent for radiographs while in the emergency department, results above.     1141 I rechecked the patient and discussed the findings of their workup thus far.     Findings and plan explained to the Patient. Patient discharged home with instructions regarding supportive care, medications, and reasons to return. The importance of close follow-up was reviewed.     I personally reviewed the laboratory and imaging results with the Patient and answered all related questions prior to discharge.     Impression & Plan   Covid-19  Iva Bowman was evaluated during a global COVID-19 pandemic, which necessitated consideration that the patient might be at risk for infection with the SARS-CoV-2 virus that causes COVID-19.   Applicable protocols for evaluation were followed during the patient's care.   COVID-19 was considered as part of the patient's evaluation. The plan for testing is:  a test was obtained at a previous visit and reviewed & considered today.    Medical Decision Making:  Afebrile 45-year-old female directed to the emergency department by nurse from clinic when she complained to them of some ongoing shortness of breath and chest discomfort.  She is complaining of a nonproductive cough for months now.  Medical history is notable for fibromyalgia and somatoform disorder.  She has had many ED visits through the years including just last week where she had negative COVID testing and chest x-ray.  She has a normal exam, temperature,  respiratory rate, and oxygen saturations.  Clinical impression is this may represent a mild viral illness versus somatoform disorder.  She has a normal EKG and chest x-ray.  RN did establish a peripheral IV and sent off some basic screening laboratory tests before my initial encounter with her those of course are normal.  There is no concern for a cardiogenic etiology or serious bacterial illness.  She is discharged with reassurance and strongly encouraged to work with her clinic for further management of her ongoing symptoms and other concerns.  Diagnosis:    ICD-10-CM    1. Cough  R05 Basic metabolic panel     Troponin I       Disposition:  Discharged to home.    Scribe Disclosure:  Alexsander PERERA, am serving as a scribe at 10:19 AM on 7/6/2020 to document services personally performed by Adiel Beltran MD based on my observations and the provider's statements to me.      Adiel Beltran MD  07/06/20 [id number]

## 2020-07-06 NOTE — DISCHARGE INSTRUCTIONS

## 2020-07-06 NOTE — ED NOTES
Bed: ED13  Expected date:   Expected time:   Means of arrival:   Comments:  Renée 592  45 y/oF; CP, SOB, cough  VSS

## 2020-07-06 NOTE — ED TRIAGE NOTES
45 year old female presents with cough since December, sharp left-sided chest pain and SOB. Patient has been tested for COVID x5 with negative results. Patient is is no apparent distress. ABCs intact without need for intervention at this time. GCS 15.

## 2020-07-20 ENCOUNTER — HOSPITAL ENCOUNTER (EMERGENCY)
Facility: CLINIC | Age: 45
Discharge: HOME | End: 2020-07-20
Attending: EMERGENCY MEDICINE | Admitting: EMERGENCY MEDICINE
Payer: MEDICARE

## 2020-07-20 ENCOUNTER — APPOINTMENT (OUTPATIENT)
Dept: GENERAL RADIOLOGY | Facility: CLINIC | Age: 45
End: 2020-07-20
Attending: EMERGENCY MEDICINE
Payer: MEDICARE

## 2020-07-20 VITALS
TEMPERATURE: 97.9 F | RESPIRATION RATE: 16 BRPM | DIASTOLIC BLOOD PRESSURE: 92 MMHG | OXYGEN SATURATION: 100 % | SYSTOLIC BLOOD PRESSURE: 149 MMHG | HEART RATE: 59 BPM

## 2020-07-20 DIAGNOSIS — R51.9 NONINTRACTABLE HEADACHE, UNSPECIFIED CHRONICITY PATTERN, UNSPECIFIED HEADACHE TYPE: ICD-10-CM

## 2020-07-20 DIAGNOSIS — R11.2 NON-INTRACTABLE VOMITING WITH NAUSEA, UNSPECIFIED VOMITING TYPE: ICD-10-CM

## 2020-07-20 LAB
ANION GAP SERPL CALCULATED.3IONS-SCNC: 7 MMOL/L (ref 3–14)
BASOPHILS # BLD AUTO: 0.1 10E9/L (ref 0–0.2)
BASOPHILS NFR BLD AUTO: 0.8 %
BUN SERPL-MCNC: 9 MG/DL (ref 7–30)
CALCIUM SERPL-MCNC: 9 MG/DL (ref 8.5–10.1)
CHLORIDE SERPL-SCNC: 106 MMOL/L (ref 94–109)
CO2 SERPL-SCNC: 27 MMOL/L (ref 20–32)
CREAT SERPL-MCNC: 0.9 MG/DL (ref 0.52–1.04)
DIFFERENTIAL METHOD BLD: NORMAL
EOSINOPHIL # BLD AUTO: 0.1 10E9/L (ref 0–0.7)
EOSINOPHIL NFR BLD AUTO: 2.1 %
ERYTHROCYTE [DISTWIDTH] IN BLOOD BY AUTOMATED COUNT: 11.9 % (ref 10–15)
GFR SERPL CREATININE-BSD FRML MDRD: 78 ML/MIN/{1.73_M2}
GLUCOSE SERPL-MCNC: 95 MG/DL (ref 70–99)
HCT VFR BLD AUTO: 46.4 % (ref 35–47)
HGB BLD-MCNC: 15 G/DL (ref 11.7–15.7)
IMM GRANULOCYTES # BLD: 0 10E9/L (ref 0–0.4)
IMM GRANULOCYTES NFR BLD: 0.5 %
LYMPHOCYTES # BLD AUTO: 1.9 10E9/L (ref 0.8–5.3)
LYMPHOCYTES NFR BLD AUTO: 30.2 %
MCH RBC QN AUTO: 29.1 PG (ref 26.5–33)
MCHC RBC AUTO-ENTMCNC: 32.3 G/DL (ref 31.5–36.5)
MCV RBC AUTO: 90 FL (ref 78–100)
MONOCYTES # BLD AUTO: 0.7 10E9/L (ref 0–1.3)
MONOCYTES NFR BLD AUTO: 11.9 %
NEUTROPHILS # BLD AUTO: 3.4 10E9/L (ref 1.6–8.3)
NEUTROPHILS NFR BLD AUTO: 54.5 %
NRBC # BLD AUTO: 0 10*3/UL
NRBC BLD AUTO-RTO: 0 /100
PLATELET # BLD AUTO: 279 10E9/L (ref 150–450)
POTASSIUM SERPL-SCNC: 3.4 MMOL/L (ref 3.4–5.3)
RBC # BLD AUTO: 5.16 10E12/L (ref 3.8–5.2)
SODIUM SERPL-SCNC: 140 MMOL/L (ref 133–144)
WBC # BLD AUTO: 6.2 10E9/L (ref 4–11)

## 2020-07-20 PROCEDURE — 99284 EMERGENCY DEPT VISIT MOD MDM: CPT | Mod: 25

## 2020-07-20 PROCEDURE — C9803 HOPD COVID-19 SPEC COLLECT: HCPCS

## 2020-07-20 PROCEDURE — 25000128 H RX IP 250 OP 636: Performed by: EMERGENCY MEDICINE

## 2020-07-20 PROCEDURE — 80048 BASIC METABOLIC PNL TOTAL CA: CPT | Performed by: EMERGENCY MEDICINE

## 2020-07-20 PROCEDURE — 96374 THER/PROPH/DIAG INJ IV PUSH: CPT

## 2020-07-20 PROCEDURE — 25800030 ZZH RX IP 258 OP 636: Performed by: EMERGENCY MEDICINE

## 2020-07-20 PROCEDURE — 96361 HYDRATE IV INFUSION ADD-ON: CPT

## 2020-07-20 PROCEDURE — 96375 TX/PRO/DX INJ NEW DRUG ADDON: CPT

## 2020-07-20 PROCEDURE — 71045 X-RAY EXAM CHEST 1 VIEW: CPT

## 2020-07-20 PROCEDURE — U0003 INFECTIOUS AGENT DETECTION BY NUCLEIC ACID (DNA OR RNA); SEVERE ACUTE RESPIRATORY SYNDROME CORONAVIRUS 2 (SARS-COV-2) (CORONAVIRUS DISEASE [COVID-19]), AMPLIFIED PROBE TECHNIQUE, MAKING USE OF HIGH THROUGHPUT TECHNOLOGIES AS DESCRIBED BY CMS-2020-01-R: HCPCS | Performed by: EMERGENCY MEDICINE

## 2020-07-20 PROCEDURE — 85025 COMPLETE CBC W/AUTO DIFF WBC: CPT | Performed by: EMERGENCY MEDICINE

## 2020-07-20 RX ORDER — ONDANSETRON 4 MG/1
4 TABLET, ORALLY DISINTEGRATING ORAL EVERY 8 HOURS PRN
Qty: 9 TABLET | Refills: 0 | Status: SHIPPED | OUTPATIENT
Start: 2020-07-20 | End: 2020-07-23

## 2020-07-20 RX ORDER — KETOROLAC TROMETHAMINE 15 MG/ML
15 INJECTION, SOLUTION INTRAMUSCULAR; INTRAVENOUS ONCE
Status: COMPLETED | OUTPATIENT
Start: 2020-07-20 | End: 2020-07-20

## 2020-07-20 RX ORDER — ONDANSETRON 2 MG/ML
4 INJECTION INTRAMUSCULAR; INTRAVENOUS ONCE
Status: COMPLETED | OUTPATIENT
Start: 2020-07-20 | End: 2020-07-20

## 2020-07-20 RX ORDER — DIPHENHYDRAMINE HYDROCHLORIDE 50 MG/ML
50 INJECTION INTRAMUSCULAR; INTRAVENOUS ONCE
Status: COMPLETED | OUTPATIENT
Start: 2020-07-20 | End: 2020-07-20

## 2020-07-20 RX ORDER — IBUPROFEN 600 MG/1
600 TABLET, FILM COATED ORAL EVERY 6 HOURS PRN
Qty: 20 TABLET | Refills: 0 | Status: SHIPPED | OUTPATIENT
Start: 2020-07-20 | End: 2023-01-03

## 2020-07-20 RX ADMIN — KETOROLAC TROMETHAMINE 15 MG: 15 INJECTION, SOLUTION INTRAMUSCULAR; INTRAVENOUS at 21:24

## 2020-07-20 RX ADMIN — SODIUM CHLORIDE 1000 ML: 9 INJECTION, SOLUTION INTRAVENOUS at 21:24

## 2020-07-20 RX ADMIN — DIPHENHYDRAMINE HYDROCHLORIDE 50 MG: 50 INJECTION, SOLUTION INTRAMUSCULAR; INTRAVENOUS at 21:25

## 2020-07-20 RX ADMIN — ONDANSETRON 4 MG: 2 INJECTION INTRAMUSCULAR; INTRAVENOUS at 21:26

## 2020-07-20 ASSESSMENT — ENCOUNTER SYMPTOMS
VOMITING: 1
COUGH: 1
NAUSEA: 1
HEADACHES: 1

## 2020-07-20 NOTE — ED AVS SNAPSHOT
[facility]  [address]  [facility] [address]  Phone:  [phone]  Fax:  [phone]                                    Iva Bowman   MRN: [id number]    Department:  [facility]   Date of Visit:  7/20/2020           After Visit Summary Signature Page    I have received my discharge instructions, and my questions have been answered. I have discussed any challenges I see with this plan with the nurse or doctor.    ..........................................................................................................................................  Patient/Patient Representative Signature      ..........................................................................................................................................  Patient Representative Print Name and Relationship to Patient    ..................................................               ................................................  Date                                   Time    ..........................................................................................................................................  Reviewed by Signature/Title    ...................................................              ..............................................  Date                                               Time          22EPIC Rev 08/18

## 2020-07-21 LAB
SARS-COV-2 RNA SPEC QL NAA+PROBE: NOT DETECTED
SPECIMEN SOURCE: NORMAL

## 2020-07-21 NOTE — ED PROVIDER NOTES
"  History     Chief Complaint:  Headache    The history is provided by the patient.      Iva Bowman is a 45 year old female with a history of migraines, borderline personality disorder, seizures who presents for evaluation of a headache that has been going on for the past 5 days. Patient reports headache has been gradual and predominately frontal in nature.  She reports it feels similar to previous headaches.  She also reports accompanying nausea and nonbilious vomiting.  She reports a cough for the past week.  No reported fever, dyspnea, chest pain, abdominal pain, focal weakness, paresthesias, slurred speech.  She reports taking Tylenol and Advil without any relief.  Patient reports she typically gets \"headache cocktails from the cancer clinic\" but they were closed. She states she has seen 3 neurologists in the past.  The patient denies any known COVID exposure.     MRI OF THE BRAIN WITHOUT AND WITH CONTRAST 4/15/2019 3:42 PM   Nonspecific cerebral white matter changes with  differential as above. Otherwise, normal brain MRI. No evidence for  acute intracranial pathology. No cortical abnormality is identified to  suggest a seizure focus.      Allergies:  Benzonatate  Haloperidol  Lorazepam  Droperidol  Levothyroxine  Phenothiazines  Prednisone  Aripiprazole  Buspirone  Ceclor [Cephalosporins]  Compazine  Dihydroergotamine  Fluoxetine  Olanzapine  Phenergan [Promethazine Hcl]  Reglan [Metoclopramide]  Risperidone  Cefaclor  Gabapentin  Gnp Allergy-Congestion Relief  Penicillins  Propranolol  Pseudoephedrine  Rizatriptan  Sumatriptan    Medications:    albuterol  cholecalciferol  dimenhyDRINATE  fluticasone  galcanezumab-gnlm  guaiFENesin-codeine   Keppra  lisinopril  medroxyPROGESTERone   MELATONIN  naproxen  ondansetron  pantoprazole  vortioxetine     Past Medical History:    Migraines  Seizure  Paroxysmal SVT  Borderline personality disorder  Elevated troponin I level  Suicidal behavior and ideation  Self " harm  Kidney stone  Anxiety  Depression  Overdose  IBS  Anemia  PTSD  Fibromyalgia  hypothyroidism  Cervicalgia  GERD  Genital herpes  External hemorrhoids  Arrhythmia  Hypertension    Past Surgical History:    Arthroscopy ankle  Cholecystectomy  D&C  Foot surgery  Tonsillectomy  Retrocalcaneal bursitis and Sarah deformity treatment     Family History:    Mother: Sjogren's, obesity, depression  Father: lipids  Son: bipolar disorder    Social History:  Smoking status: former smoker  Alcohol use: 5 times a year  Negative for drug use.  Marital Status:   [4]     Review of Systems   Respiratory: Positive for cough.    Gastrointestinal: Positive for nausea and vomiting.   Neurological: Positive for headaches.   All other systems reviewed and are negative.    Physical Exam     Patient Vitals for the past 24 hrs:   BP Temp Pulse Resp SpO2   07/20/20 2145 (!) 142/96 -- 63 -- 100 %   07/20/20 2130 133/89 -- 74 -- 99 %   07/20/20 2115 137/88 -- 68 -- 100 %   07/20/20 2105 (!) 128/90 -- 60 -- 100 %   07/20/20 2032 (!) 129/93 97.9  F (36.6  C) 80 16 96 %     Physical Exam  General: Well-nourished, nontoxic  Eyes: PERRL, EOMI, no nystagmus.  No scleral icterus or conjunctival injection.    ENT:  Moist mucus membranes, posterior oropharynx clear without erythema or exudates. TM normal bilaterally  Neck: Supple with full range of motion  Respiratory:  Lungs clear to auscultation bilaterally, no crackles/rubs/wheezes.  Good air movement  CV: Normal rate and rhythm, no murmurs/rubs/gallops  GI:  Abdomen soft and non-distended.  No tenderness, guarding or rebound  Skin: Warm, dry.  No rashes or petechiae  MSK: No peripheral edema or calf tenderness  Neuro: Alert and oriented to person/place/time.  No aphasia/facial droop/dysarthria.  Tongue midline, normal strength at SCM/trapezius/BUE/BLE.  Normal finger to nose. Normal gait.  Negative romberg, sensation intact over face/BUE/BLE  Psychiatric: Blunt affect    Emergency  Department Course     Imaging:  Radiology findings were communicated with the patient who voiced understanding of the findings.    XR Chest Portable 1 View:   Negative chest.  As per radiology.     Laboratory:  Laboratory findings were communicated with the patient who voiced understanding of the findings.    CBC: WBC: 6.2, HGB: 15.0, PLT: 279  BMP: All WNL (Creatinine: 0.90)    Symptomatic COVID: pending     Interventions:  2124 NS 1L IV  2124 Toradol 15 mg IV  2125 Benadryl 50 mg IV  2126 Zofran 4 mg IV    Emergency Department Course:  Past medical records, nursing notes, and vitals reviewed.    2108 I performed an exam of the patient as documented above.       IV was inserted and blood was drawn for laboratory testing, results above.     The patient was sent for a xray while in the emergency department, results above.      I rechecked the patient and discussed the results of her workup thus far.     Findings and plan explained to the Patient. Patient discharged home with instructions regarding supportive care, medications, and reasons to return. The importance of close follow-up was reviewed.    I personally reviewed the laboratory and imaging results with the Patient and answered all related questions prior to admission.     Impression & Plan     Covid-19  Iva Bowman was evaluated during a global COVID-19 pandemic, which necessitated consideration that the patient might be at risk for infection with the SARS-CoV-2 virus that causes COVID-19.   Applicable protocols for evaluation were followed during the patient's care.   COVID-19 was considered as part of the patient's evaluation. The plan for testing is:  a test was obtained during this visit.    Medical Decision Making:  Patient is a 45-year-old female presenting with headache, nausea, vomiting and cough.  She presents to the ED frequently for these complaints, here 9 times in past 5 months.  She is nontoxic, clinically well-hydrated on arrival.  She is  neurologically intact and states that this headache is similar to previous headaches.  No emergent indication for neuroimaging at this time.  She has no abdominal tenderness and I doubt intraabdominal catastrophe.  The patient has not had any fever or neck stiffness so I doubt meningitis.  The headache was gradual in onset and like previous headaches so I doubt SAH.  There is no associated numbness, paresthesia or confusion and I doubt stroke or CNS tumor.  Her labs are reassuring.  She also reported cough though CXR without focal process. In light of her symptoms she was tested for COVID-19. She was made aware of need for self isolation pending testing results. On reevaluation, the patient reported symptom improvement and was comfortable with dispo home.  Upon discharge however by nursing staff, she was cursing at nursing stating nothing was done.  I am quite familiar with this patient and she has a tendency to do this at dispo.  She was requesting narcotic medications on dispo and I stated those are not indicated at this time.  I recommended neurology f/u and offered referral though patient has seen 3 neurologists in the past.   She was escorted out of the ED by security. Return if increasing pain, fever, vomiting or weakness.      Diagnosis:    ICD-10-CM    1. Non-intractable vomiting with nausea, unspecified vomiting type  R11.2    2. Nonintractable headache, unspecified chronicity pattern, unspecified headache type  R51        Disposition:  Discharged to home.    Scribe Disclosure:  I, Keyur Guerra, am serving as a scribe at 8:56 PM on 7/20/2020 to document services personally performed by Susan Lopez DO based on my observations and the provider's statements to me.        Susan Lopez DO  07/20/20 1150

## 2020-07-21 NOTE — DISCHARGE INSTRUCTIONS
Discharge Instructions  Headache    You were seen today for a headache. Headaches may be caused by many different things such as muscle tension, sinus inflammation, anxiety and stress, having too little sleep, too much alcohol, some medical conditions or injury. You may have a migraine, which is caused by changes in the blood vessels in your head.  At this time your provider does not find that your headache is a sign of anything dangerous or life-threatening.  However, sometimes the signs of serious illness do not show up right away.      Generally, every Emergency Department visit should have a follow-up clinic visit with either a primary or a specialty clinic/provider. Please follow-up as instructed by your emergency provider today.    Return to the Emergency Department if:  You get a new fever of 100.4 F or higher.  Your headache gets much worse.  You get a stiff neck with your headache.  You get a new headache that is significantly different or worse than headaches you have had before.  You are vomiting (throwing up) and cannot keep food or water down.  You have blurry or double vision or other problems with your eyes.  You have a new weakness on one side of your body.  You have difficulty with balance which is new.  You or your family thinks you are confused.  You have a seizure.    What can I do to help myself?  Pain medications - You may take a pain medication such as Tylenol  (acetaminophen), Advil , Motrin  (ibuprofen) or Aleve  (naproxen).  Take a pain reliever as soon as you notice symptoms.  Starting medications as soon as you start to have symptoms may lessen the amount of pain you have.  Relaxing in a quiet, dark room may help.  Get enough sleep and eat meals regularly.  You may need to watch for certain foods or other things which may trigger your headaches.  Keeping a journal of your headaches and possible triggers may help you and your primary provider to identify things which you should avoid which  may be causing your headaches.  If you were given a prescription for medicine here today, be sure to read all of the information (including the package insert) that comes with your prescription.  This will include important information about the medicine, its side effects, and any warnings that you need to know about.  The pharmacist who fills the prescription can provide more information and answer questions you may have about the medicine.  If you have questions or concerns that the pharmacist cannot address, please call or return to the Emergency Department.   Remember that you can always come back to the Emergency Department if you are not able to see your regular provider in the amount of time listed above, if you get any new symptoms, or if there is anything that worries you.    Discharge Instructions  COVID-19    Your Provider has determined that you should practice self-isolation and self-monitoring in order to protect yourself and your community from COVID-19, which is the disease caused by a new coronavirus. The virus spreads from person to person primarily by droplets when an infected person coughs or sneezes and the droplet either lands on another person or that other person touches a surface with the droplet on it. Diagnosis of COVID-19 can be made with a test but many times the test is unavailable or not necessary. There is no specific treatment or medicine for the disease.    Symptoms of COVID-19  Many people have no symptoms or mild symptoms.  Symptoms may usually appear 4 to 5 days (up to 14 days) after contact with another ill person. Some people will get severe symptoms and pneumonia. Usual symptoms are:       Fever    Cough    Trouble breathing    Less common symptoms are: Headache, body aches, sore throat,     sneezing, diarrhea.    How to Care for Yourself    Stay home.  Most people will recover from illness with mild symptoms.  Isolation by staying home is the best method to prevent the spread  of the illness. Do not go to work or school. Have a friend or relative do your shopping. Do not use public transportation (bus, train) or ridesharing (Lyft, Uber).    How long should I stay home?  If you have symptoms of a respiratory disease (fever, cough), you should stay home for at least 10 days, and for 3 days with no fever and improvement of respiratory symptoms--whichever is longer. (Your fever should be gone for 3 days without using fever-reducing medicine.)    For example, if you have a fever and cough for 6 days, you need to stay home 4 more days with no fever for a total of 10 days. Or, if you have a fever and cough for 8 days, you need to stay home 3 more days with no fever for a total of 11 days.    Separate yourself from other people: in your home.?As much as possible, you should stay in one room and away from other people in your home. Also, use a separate bathroom, if possible. Avoid handling pets or other animals while sick.     Wear a facemask: if you need to be around other people and cover your mouth and nose with a tissue when you cough or sneeze.     Avoid sharing personal household items: You should not share dishes, drinking glasses, forks/knives/spoons, towels, or bedding with other people in your home. After using these items, they should be washed with soap and water. Clean parts of your home that are touched often (doorknobs, faucets, countertops, etc.) daily.     Wash your hands: often with soap and water for at least 20 seconds or use an alcohol-based hand  containing at least 60% alcohol.     Avoid touching your face.    Treat your symptoms: Take Acetaminophen (Tylenol) to treat body aches and fever as needed for comfort. Ibuprofen (Advil or Motrin) can be used as well if you still have symptoms after taking Tylenol.  Drink fluids. Rest.    Watch for worsening symptoms: shortness of breath, or difficulty breathing.    Employers/workplaces: are being asked by the [facility] for  Disease Control (CDC) to not request notes/documentation for you to return to work or prove that you were ill. You may choose to show your employer this paperwork.    Return to the Emergency Department if:  If you are developing worsening breathing, shortness of breath, or feel worse you should seek medical attention.  If you are uncertain, contact your health care provider/clinic. If you need emergency medical attention, call 911 and tell them you have been ill.

## 2020-07-21 NOTE — ED NOTES
"I went into pt's room to prepare her for discharge following pt telling MD she was ready to go home. I went into the room and informed pt it was time for discharge. She stated \"I can't leave I'm not better. You didn't do anything. My headache is still here. I need more meds. I won't leave\". I informed her that it was time for discharge, that she was medically cleared, and reviewed the discharge plan with her. IV was removed. Pt was informed to get changed and follow-up with primary care. Stated \"my primary care provider gave up on me too. They won't see me\". I informed her to follow-up with a new PCP and neurologist as needed, pt appeared displeased, I left the room to give pt privacy to change. Soon after pt put on call light, answered by KATHI Shelton who informed me that pt stated \"I wont leave. I am not leaving. I am supposed to get prescriptions. I will not leave\". MD Lopez will be notified following speaking w/ pt myself.   "

## 2020-07-21 NOTE — ED NOTES
"MD Lopez informed of pt's reaction to discharge plan. I requested zofran and ibuprofen for pt's pain control w/ discharge. Discharge rx orders were placed. As I was about to bring these prescriptions to pt she was standing at my desk, crying, appearing hysterical. She began screaming at me that I was not helping, that the MD wasn't helping, that her plan of care was not enough. I asked her to go back to her room and we can calmly discuss there. W/ much encouragement she began moving back to her room. Pt began yelling and swearing at me once in her room. Reporting \"you didn't do shit for me. What are these prescriptions? Ibuprofen and Tylenol don't do shit\". Pt became more hysterical and appeared to become increasingly agitated. KATHI Baer, came to assist in deescalating the situation. Pt began yelling at me more, I informed her that I understand her frustration, but unfortunately she will need to follow-up w/ her primary and possibly a neurologist for her migraine care. She began yelling at me more, ripped her ibuprofen prescription stating \"I don't want this fucking prescription\" and threw it at me. The prescription did not hit me and I told the patient it is not appropriate to throw things at me, she began to escalate. Security was called. Larry Saunders came to assist. I again told her I empathize w/ her situation, what we believed the options are for her, and that it is time for her to change and head home. She then again screamed at me that we are liars and the doctor was a liar, that we did nothing to care for her, and that she won't leave. Larry Saunders informed her that he will go grab a neurologist card for her if that will help. She agreed. At that time larry Saunders took over the situation and assisted the pt to the lobby w/ escort by security. Pt was screaming through hallway and lobby.   "

## 2020-07-21 NOTE — ED NOTES
Pt rounding done, pt reports improvement in headache on R side, but worsening headache on L side. MD will be notified.

## 2020-07-21 NOTE — ED NOTES
"I went into pt's room to encourage her to change and head home. She began screaming at me. Stating \"the doctor told me I will be getting prescriptions to go home with\". I informed her that this is not apart of her discharge plan and that I spoke w/ the doctor and she informed me that there weren't prescriptions to discharge with. Pt began yelling \"the doctor is a liar. The doctor lied to me. She told me she was going to give me prescriptions. I need pain medication. My head hurt on both sides but it still hurts on the one side. She lied\". I informed her that the MD did not mention rx to me, that I am sorry that she feels as though the MD lie to her, but that I don't believe she would have done that\". Pt began crying, I informed her of the discharge plan again, and requested she change while I clarify w/ MD.   "

## 2020-08-19 DIAGNOSIS — G40.909 SEIZURE DISORDER (H): ICD-10-CM

## 2020-08-24 NOTE — TELEPHONE ENCOUNTER
LEVETIRACETAM 500MG TABLETS       Last Written Prescription Date:  4/15/19  Last Fill Quantity: 360,   # refills: 11  Last Office Visit : 5/31/19  Future Office visit:  None scheduledd    Routing refill request to provider for review/approval because:  Greater than 14 months since last visit, 30 day refill pended and routed to provider per protocol

## 2020-08-25 DIAGNOSIS — G40.909 SEIZURE DISORDER (H): ICD-10-CM

## 2020-08-25 RX ORDER — LEVETIRACETAM 500 MG/1
1000 TABLET ORAL 2 TIMES DAILY
Qty: 120 TABLET | Refills: 4 | Status: SHIPPED | OUTPATIENT
Start: 2020-08-25 | End: 2020-09-04

## 2020-08-31 DIAGNOSIS — G40.909 SEIZURE DISORDER (H): ICD-10-CM

## 2020-08-31 RX ORDER — LEVETIRACETAM 500 MG/1
TABLET ORAL
Qty: 360 TABLET | OUTPATIENT
Start: 2020-08-31

## 2020-08-31 NOTE — TELEPHONE ENCOUNTER
LEVETIRACETAM 500MG TABLETS   levETIRAcetam (KEPPRA) 500 MG tablet  120 tablet  4  8/25/2020   No    Sig - Route: Take 2 tablets (1,000 mg) by mouth 2 times daily For additional refills, please schedule a follow-up appointment with Dr Boss at [phone] - Oral    Sent to pharmacy as: levETIRAcetam 500 MG Oral Tablet (KEPPRA)    Class: E-Prescribe    Order: [id number]    E-Prescribing Status: Receipt confirmed by pharmacy (8/25/2020 12:10 PM CDT)    Printout Tracking     External Result Report    Medication Administration Instructions     For additional refills, please schedule a follow-up appointment with Dr Boss at [phone]    Pharmacy     [id number] [address] AT [place] [address]        Octavia Anglin RN  Central Triage Red Flags/Med Refills

## 2020-09-04 NOTE — TELEPHONE ENCOUNTER
LEVETIRACETAM 500MG TABLETS       Last Written Prescription Date:  8-25-20  Last Fill Quantity: 120 tab ,   # refills: 4  Last Office Visit : 5-31-19  Future Office visit:  none    Creatinine   Date Value Ref Range Status   07/20/2020 0.90 0.52 - 1.04 mg/dL Final       Routing refill request to provider for review/approval because:  Pt requesting 90 day- last fill 30 day  ? Ok for 90 day: 0 RF ,    Scheduling has been notified to contact the pt for appointment.

## 2020-09-07 RX ORDER — LEVETIRACETAM 500 MG/1
1000 TABLET ORAL 2 TIMES DAILY
Qty: 360 TABLET | Refills: 4 | Status: SHIPPED | OUTPATIENT
Start: 2020-09-07 | End: 2020-09-29

## 2020-09-23 ENCOUNTER — TELEPHONE (OUTPATIENT)
Dept: NEUROLOGY | Facility: CLINIC | Age: 45
End: 2020-09-23

## 2020-09-23 DIAGNOSIS — G40.909 SEIZURE DISORDER (H): Primary | ICD-10-CM

## 2020-09-23 NOTE — TELEPHONE ENCOUNTER
[facility] Call Center    Phone Message    May a detailed message be left on voicemail: yes     Reason for Call: Order(s): Other:   Reason for requested: lab order prior to appointment. Please call patient when orders are placed  Date needed: asap  Provider name: Dr. Boss      Action Taken: Message routed to:  [facility] evelia morgan    Travel Screening: Not Applicable

## 2020-09-24 ENCOUNTER — TELEPHONE (OUTPATIENT)
Dept: NEUROLOGY | Facility: CLINIC | Age: 45
End: 2020-09-24

## 2020-09-24 NOTE — TELEPHONE ENCOUNTER
[facility] Call Center    Phone Message    May a detailed message be left on voicemail: yes     Reason for Call: Other: Joann calling to request a call back to discuss Iva's plan of care. Joann stated that Iva has had 11 visits to the ED since July, all for headaches. Please call Joann back at your earliest convenience to discuss.       Action Taken: Message routed to:  [facility]    Travel Screening: Not Applicable

## 2020-09-25 NOTE — TELEPHONE ENCOUNTER
I returned call to social work team at [facility] ED. I spoke with Ysabel who updated me this pt has been seen 11 times since July for headaches. They have tried to coordinate with [facility] to set up treatment plans to keep pt out of ED; these were unsuccessful. Pt is no longer following with [facility]. She is scheduled to see Dr. Boss on 9/29 at 3pm for a video visit. He has seen her before for seizure management. Dr. Boss will be able to touch base with pt about her seizures and headaches. I did let Ysabel know we also have a headache clinic with specialty providers which maybe a good resource for this pt considering the number of ED visits.     Ysabel is going to update the ED supervisor and ED medical director regarding pt upcoming neurology appt. She will call next week to see how the visit with Dr. Boss goes and if there is a plan for headache management.     I will update Dr. Boss so he is aware of this ongoing situation prior to video visit.     Kelli DEMPSEY

## 2020-09-28 ENCOUNTER — DOCUMENTATION ONLY (OUTPATIENT)
Dept: NEUROLOGY | Facility: CLINIC | Age: 45
End: 2020-09-28

## 2020-09-29 ENCOUNTER — VIRTUAL VISIT (OUTPATIENT)
Dept: NEUROLOGY | Facility: CLINIC | Age: 45
End: 2020-09-29
Payer: MEDICARE

## 2020-09-29 DIAGNOSIS — G40.909 SEIZURE DISORDER (H): ICD-10-CM

## 2020-09-29 RX ORDER — LEVETIRACETAM 1000 MG/1
TABLET ORAL
Qty: 60 TABLET | Refills: 11 | Status: SHIPPED | OUTPATIENT
Start: 2020-09-29 | End: 2021-06-18

## 2020-09-29 NOTE — LETTER
2020       RE: Iva Bowman  [address]  [facility] [address]     Dear Colleague,    Thank you for referring your patient, Iva Bowman, to the [facility] NEUROLOGY at [facility]. Please see a copy of my visit note below.    Service Date: 2020      Theresa Hall NP   [facility]    [address] [place] [address]      RE: Iva Bowman    MRN: [id number]   : 1975      Dear Ms. Rafael:       We have followed this patient Ms. Iva Bowman for some time regarding a history of seizures.  The last confirmed seizure was in .  She has been relatively compliant with her medications, which she takes in the form of levetiracetam 1000 mg twice a day.  We do not have a concentration recently, even though she was in the ER recently after having an episode.  She was diagnosed for a migraine, and she ended up with 911, and she did not really have any major seizure activity reported.  Tonic-clonic activity from the ER reports it as a migraine.  We have had imaging to her in the past with brain damage in the last year, and this was pretty reasonable and normal.  Her EEGs have at times been equivocal.  There are a lot of psychological factors with her.  The last EEG was in , and this was showing right temporal slowing.  She has had other EEG studies in the past.  She has headaches, and these have been attended to at [facility] and also at a pain clinic where she gets infusions of combination of medications for headache prophylaxis.  She is also on Botox and other meds for chronic headaches.  She has a well-structured program.        She has been tested for sleep apnea and did follow through, but has been diagnosed as insomnia.  She has major depression, for which she does have a good system of providers for her for that.  We had received a call from Dr. Ann regarding her complaints of multiple headaches, but this  has been scheduled for her and has been treated for her, and my role has been mostly due to medication for her seizures.  We did have a long talk about her situation with her son.  The son has had a child, and because of the mother, she has only seen the child 3 times.  She is really pretty vulnerable and fragile and has had major depression.      OBJECTIVE:  We examined her today briefly.  Her blood pressure is 149/92; she says she has been treated for that.  She is very teary.  She has gained 45 pounds probably related to the COVID epidemic.  She has had suicidal thoughts in the past, but says she is not actively suicidal now, but she has people to talk to if she redevelops that.  We gave her our access number in case she needs it.        She has had major issues in the past, and certainly we will continue to support her.  Her 's form is due 10/27.  We need a blood level before that, and that will be called to your office at [facility] so she satisfies those requirements.  In the meantime, we will continue to monitor the situation.      Again, thank you for allowing me to participate in the care of your patient.  Sincerely,        AMY CHAPARRO MD  D: 2020   T: 2020   MT: charles      Name:     GENE GLEZ   MRN:      [id number]        Account:      [id number]   :      1975           Service Date: 2020      Document: [id number]

## 2020-09-29 NOTE — PROGRESS NOTES
"preet Bowman is a 45 year old female who is being evaluated via a billable video visit.      The patient has been notified of following:     \"This video visit will be conducted via a call between you and your physician/provider. We have found that certain health care needs can be provided without the need for an in-person physical exam.  This service lets us provide the care you need with a video conversation.  If a prescription is necessary we can send it directly to your pharmacy.  If lab work is needed we can place an order for that and you can then stop by our lab to have the test done at a later time.    Video visits are billed at different rates depending on your insurance coverage.  Please reach out to your insurance provider with any questions.    If during the course of the call the physician/provider feels a video visit is not appropriate, you will not be charged for this service.\"    Patient has given verbal consent for Video visit? YES  How would you like to obtain your AVS? mychart  If you are dropped from the video visit, the video invite should be resent to:   Will anyone else be joining your video visit?         Video-Visit Details    Type of service:  Video Visit x 30 min        yessenia Contreras, EMT  "

## 2020-09-29 NOTE — LETTER
2020       RE: Iva Bowman  [address]  [facility] [address]     Dear Colleague,    Thank you for referring your patient, Iva Bowman, to the [facility] NEUROLOGY at [facility]. Please see a copy of my visit note below.    Service Date: 2020      Theresa Hall NP   [facility]    [address] [place] [address]      RE: Iva Bowman    MRN: [id number]   : 1975      Dear Ms. Rafael:       We have followed this patient Ms. Iva Bowman for some time regarding a history of seizures.  The last confirmed seizure was in .  She has been relatively compliant with her medications, which she takes in the form of levetiracetam 1000 mg twice a day.  We do not have a concentration recently, even though she was in the ER recently after having an episode.  She was diagnosed for a migraine, and she ended up with 911, and she did not really have any major seizure activity reported.  Tonic-clonic activity from the ER reports it as a migraine.  We have had imaging to her in the past with brain damage in the last year, and this was pretty reasonable and normal.  Her EEGs have at times been equivocal.  There are a lot of psychological factors with her.  The last EEG was in , and this was showing right temporal slowing.  She has had other EEG studies in the past.  She has headaches, and these have been attended to at [facility] and also at a pain clinic where she gets infusions of combination of medications for headache prophylaxis.  She is also on Botox and other meds for chronic headaches.  She has a well-structured program.          She has been tested for sleep apnea and did follow through, but has been diagnosed as insomnia.  She has major depression, for which she does have a good system of providers for her for that.  We had received a call from Dr. Ann*** regarding her complaints of multiple headaches, but  this has been scheduled for her and has been treated for her, and my role has been mostly due to medication for her seizures.  We did have a long talk about her situation with her son.  The son has had a child, and because of the mother, she has only seen the child 3 times.  She is really pretty vulnerable and fragile and has had major depression.      OBJECTIVE:  We examined her today briefly.  Her blood pressure is 149/92; she says she has been treated for that.  She is very teary.  She has gained 45 pounds probably related to the COVID epidemic.  She has had suicidal thoughts in the past, but says she is not actively suicidal now, but she has people to talk to if she redevelops that.  We gave her our access number in case she needs it.        She has had major issues in the past, and certainly we will continue to support her.  Her 's form is due 10/27.  We need a blood level before that, and that will be called to your office at [facility] so she satisfies those requirements.  In the meantime, we will continue to monitor the situation.      Again, thank you for allowing me to participate in the care of your patient.  Sincerely,        AMY CHAPARRO MD  D: 2020   T: 2020   MT: charles      Name:     GENE GLEZ   MRN:      [id number]        Account:      [id number]   :      1975           Service Date: 2020      Document: [id number]

## 2020-10-01 NOTE — PROGRESS NOTES
Service Date: 2020      Theresa Hall NP   [facility]    [address] [place] [address]      RE: Iva Bowman    MRN: [id number]   : 1975      Dear Ms. Hall:       We have followed this patient Ms. Iva Bowman for some time regarding a history of seizures.  The last confirmed seizure was in .  She has been relatively compliant with her medications, which she takes in the form of levetiracetam 1000 mg twice a day.  We do not have a concentration recently, even though she was in the ER recently after having an episode.  She was diagnosed for a migraine, and she ended up with 911, and she did not really have any major seizure activity reported.  Tonic-clonic activity from the ER reports it as a migraine.  We have had imaging to her in the past with brain damage in the last year, and this was pretty reasonable and normal.  Her EEGs have at times been equivocal.  There are a lot of psychological factors with her.  The last EEG was in , and this was showing right temporal slowing.  She has had other EEG studies in the past.  She has headaches, and these have been attended to at [facility] and also at a pain clinic where she gets infusions of combination of medications for headache prophylaxis.  She is also on Botox and other meds for chronic headaches.  She has a well-structured program.      She has been tested for sleep apnea and did follow through, but has been diagnosed as insomnia.  She has major depression, for which she does have a good system of providers for her for that.  We had received a call from Dr. Ann regarding her complaints of multiple headaches, but this has been scheduled for her and has been treated for her, and my role has been mostly due to medication for her seizures.  We did have a long talk about her situation with her son.  The son has had a child, and because of the mother, she has only seen the child 3 times.  She is really pretty  vulnerable and fragile and has had major depression.      OBJECTIVE:  We examined her today briefly.  Her blood pressure is 149/92; she says she has been treated for that.  She is very teary.  She has gained 45 pounds probably related to the COVID epidemic.  She has had suicidal thoughts in the past, but says she is not actively suicidal now, but she has people to talk to if she redevelops that.  We gave her our access number in case she needs it.        She has had major issues in the past, and certainly we will continue to support her.  Her 's form is due 10/27.  We need a blood level before that, and that will be called to your office at [facility] so she satisfies those requirements.  In the meantime, we will continue to monitor the situation.      Sincerely,      MD AMY Harris MD             D: 2020   T: 2020   MT: charles      Name:     GENE GLEZ   MRN:      [id number]        Account:      [id number]   :      1975           Service Date: 2020      Document: [id number]

## 2021-06-09 ENCOUNTER — TELEPHONE (OUTPATIENT)
Dept: NEUROLOGY | Facility: CLINIC | Age: 46
End: 2021-06-09

## 2021-06-09 NOTE — TELEPHONE ENCOUNTER
"Returned call to patient who indicates that ED MD did not think that she had a seizure because \"labs did not indicate it\". She was treated for a migraine which she indicates was only a head ache last night in the ED, but a full migraine today.    Iva indicated that the seizure was \"out of the blue\", but does also state that she drank less water, more Mt. Dew than usual, and was out in the heat and could have been dehydrated.     Instructed patient to get good hydration and to get some Gatorade.     This nurse will call for labs and consult with Karyn Loredo for recommendations.     Camilozandra Lora, RN    "

## 2021-06-09 NOTE — TELEPHONE ENCOUNTER
[facility] Call Center    Phone Message    May a detailed message be left on voicemail: yes     Reason for Call: Symptoms or Concerns     If patient has red-flag symptoms, warm transfer to triage line    Current symptom or concern: Seizure-like activity    Symptoms have been present for:  1 day(s)    Has patient previously been seen for this? Yes    By Dr. Boss    Date: 9/29/20    Are there any new or worsening symptoms? Yes: Pt reports that she believes she had pedi mal seizure yesterday as she blacked out and then awoke disorientated. She then went to the ER and had some blood work done. She says that ER doctor told her that she did not have seizure.     She says that now she feel very confused as to what to think and would like to discuss her current plan of care.    Please call Pt back to discuss.      Action Taken: Message routed to:  [facility] Neurology    Travel Screening: Not Applicable

## 2021-06-09 NOTE — TELEPHONE ENCOUNTER
[facility] Call Center    Phone Message    May a detailed message be left on voicemail: yes     Reason for Call: Other: Patient calling to add onto notes. States that when she felt this incident happening she had a fronal headache on left front side and states that awhile after she was in the hospital it grew to left side. States that doctor in hospital at [place] thought it was due to migraines. States that she was treated for a migraine. States that she then got a lot of lab and eventually doctor knew she wasn't happy with that so doctor did MRI of head.      Please advise and call patient back at your earliest convenience     Action Taken: Other: [facility] NEUROLOGY    Travel Screening: Not Applicable

## 2021-06-11 ENCOUNTER — TELEPHONE (OUTPATIENT)
Dept: NEUROLOGY | Facility: CLINIC | Age: 46
End: 2021-06-11

## 2021-06-11 NOTE — TELEPHONE ENCOUNTER
Patient returning Camilo's call and attempted to schedule a ED follow up as a video visit with Dr. Boss for first available which isn't until 08/02/21- Patient stated that Karyn wanted to see her next week and patients doesn't want to wait that long and would like to know what is advised for scheduling options.     Please advise

## 2021-06-11 NOTE — TELEPHONE ENCOUNTER
[facility] Call Center    Phone Message    May a detailed message be left on voicemail: yes     Reason for Call: Other: Pt called back as she is still waiting to hear opinion of Dr. Boss regarding the episode she experiened on  6/8/21.    Please call Pt back to advise.    Action Taken: Message routed to:  [facility] Neurology    Travel Screening: Not Applicable          Returned call to patient with message that Dr. Boss wanted a phone visit with her and that I would have scheduling call her to set it up.  Message sent to scheduling asking them to call patient and schedule    Camilo Lora RN

## 2021-06-14 ENCOUNTER — VIRTUAL VISIT (OUTPATIENT)
Dept: NEUROLOGY | Facility: CLINIC | Age: 46
End: 2021-06-14
Payer: MEDICARE

## 2021-06-14 DIAGNOSIS — G40.909 SEIZURE DISORDER (H): ICD-10-CM

## 2021-06-14 DIAGNOSIS — G40.109 PARTIAL EPILEPSY (H): Primary | ICD-10-CM

## 2021-06-14 PROCEDURE — 80177 DRUG SCRN QUAN LEVETIRACETAM: CPT | Mod: 90 | Performed by: PSYCHIATRY & NEUROLOGY

## 2021-06-14 PROCEDURE — 99000 SPECIMEN HANDLING OFFICE-LAB: CPT | Performed by: PSYCHIATRY & NEUROLOGY

## 2021-06-14 PROCEDURE — 99443 PR PHYSICIAN TELEPHONE EVALUATION 21-30 MIN: CPT | Performed by: PSYCHIATRY & NEUROLOGY

## 2021-06-14 PROCEDURE — 36415 COLL VENOUS BLD VENIPUNCTURE: CPT | Performed by: PSYCHIATRY & NEUROLOGY

## 2021-06-14 RX ORDER — RIMEGEPANT SULFATE 75 MG/75MG
1 TABLET, ORALLY DISINTEGRATING ORAL DAILY
COMMUNITY
Start: 2021-01-14

## 2021-06-14 RX ORDER — PROPRANOLOL HCL 60 MG
1 CAPSULE, EXTENDED RELEASE 24HR ORAL DAILY
COMMUNITY
Start: 2021-04-14

## 2021-06-14 RX ORDER — MULTIPLE VITAMINS W/ MINERALS TAB 9MG-400MCG
1 TAB ORAL DAILY
COMMUNITY

## 2021-06-14 RX ORDER — UBROGEPANT 50 MG/1
1 TABLET ORAL
COMMUNITY
Start: 2021-01-04

## 2021-06-14 RX ORDER — LOSARTAN POTASSIUM 100 MG/1
100 TABLET ORAL DAILY
COMMUNITY
Start: 2021-03-29

## 2021-06-14 NOTE — PROGRESS NOTES
Iva is a 46 year old who is being evaluated via a billable telephone visit.      What phone number would you like to be contacted at? [phone]  How would you like to obtain your AVS? [place]  Phone call duration: 30 minutes  yessenia

## 2021-06-14 NOTE — LETTER
2021       RE: Iva Bowman  [address]  [facility] [address]     Dear Colleague,    Thank you for referring your patient, Iva Bowman, to the [facility] [place] at [facility]. Please see a copy of my visit note below.    Iva is a 46 year old who is being evaluated via a billable telephone visit.      What phone number would you like to be contacted at? [phone]  How would you like to obtain your AVS? Mychart  Phone call duration: 30 minutes  fiol    Note Date: 2021    CINDY Franco  [facility]  [address] [place] [address]    RE:      Iva Bowman  MRN:  [id number]  :   1975     Dear Dr. Hall:    We had a telephone visit with Ms. Iva Bowman lasting approximately 25 minutes.  Iva is a patient who has been followed by us for a seizure disorder and she has been on levetiracetam 1000 twice a day with seemingly good control.  She has been driving.  She lives alone.  She had an episode 9 days ago when she was watching Wheel of [employer], when suddenly she blacked out, could not remember what had happened and then woke up and she said she was disoriented.  After that, she did not have any further problem.  There was no seizure or motoric activity.  She has not had a blood level of the medication and she did miss 1 dose last week, but it seems like a few days before.    She did call and reported this to us and we immediately set up this appointment.    PHYSICAL EXAMINATION:  On exam today, she sounds good.  Alert, cooperative, pleasant.  Blood pressure 149/92.  Her medical issues were reviewed.  She does have a history of migraine condition, polymyalgia, borderline personality, suicidal behavior, anxiety, depression and overdose.  She has had imaging studies done, which I think have been relatively good.  We do not have a current Keppra level.    She says she  has not been having episodes of seizure activity in the past, but I will need to review that.  In summary, she had an episode in which she seemed to black out - I not sure what happened there.  In the meantime, we told her not to drive for 30 days.  She is going to be notified from the motor vehicle department.  We will check a blood level of the medication to see that she stayed within the normal range and it has been a while since she has had one.  In the meantime, she is asked not to drive.  She did go to the Emergency Room at the [facility] when she had this episode and at that time, she reported to them that she had a 15-minute episode where she passed out and then she had some left facial numbness, but this was later when a headache developed and she has had this apparently before.    There has been no COVID recently.  No fevers or anything else.  She was seen in the ER there and discharged.  She had imaging discussed but was not proceeded to.  It was not thought to be a TIA and she did not receive any TPA or anything.  The story is that she has a history of migraine  presented with a possible syncopal episode, which she did not declare to me to be.  She had a similar incident on the part before she passed out.  She was seen in the ER there and released to follow with us and then she had a normal exam at that time.  Blood workup was extensive and she had MRI of the brain and no abnormalities were seen.    So in summary, we are not sure what these episodes tell us.  She did have a normal MRI.    We will see what her concentration of the drug is and follow through.  In the meantime, she will not drive.    Sincerely,    Jon Boss MD        D: 2021   T: 2021   MT: al    Name:     GENE GLEZ  MRN:      [id number]        Account:   [id number]   :      1975           Note Date: 2021       Document: [id number]        Again, thank you for allowing me to  participate in the care of your patient.      Sincerely,    Jon Boss MD

## 2021-06-14 NOTE — PROGRESS NOTES
Note Date: 2021    CINDY Franco  [facility]  [address] [place] [address]    RE:      Iva Bowman  MRN:  [id number]  :   1975     Dear Dr. Hall:    We had a telephone visit with Ms. Iva Bowman lasting approximately 25 minutes.  Iva is a patient who has been followed by us for a seizure disorder and she has been on levetiracetam 1000 twice a day with seemingly good control.  She has been driving.  She lives alone.  She had an episode 9 days ago when she was watching Wheel of [employer], when suddenly she blacked out, could not remember what had happened and then woke up and she said she was disoriented.  After that, she did not have any further problem.  There was no seizure or motoric activity.  She has not had a blood level of the medication and she did miss 1 dose last week, but it seems like a few days before.    She did call and reported this to us and we immediately set up this appointment.    PHYSICAL EXAMINATION:  On exam today, she sounds good.  Alert, cooperative, pleasant.  Blood pressure 149/92.  Her medical issues were reviewed.  She does have a history of migraine condition, polymyalgia, borderline personality, suicidal behavior, anxiety, depression and overdose.  She has had imaging studies done, which I think have been relatively good.  We do not have a current Keppra level.    She says she has not been having episodes of seizure activity in the past, but I will need to review that.  In summary, she had an episode in which she seemed to black out - I not sure what happened there.  In the meantime, we told her not to drive for 30 days.  She is going to be notified from the motor vehicle department.  We will check a blood level of the medication to see that she stayed within the normal range and it has been a while since she has had one.  In the meantime, she is asked not to drive.  She did go to the Emergency Room at the [facility]  when she had this episode and at that time, she reported to them that she had a 15-minute episode where she passed out and then she had some left facial numbness, but this was later when a headache developed and she has had this apparently before.    There has been no COVID recently.  No fevers or anything else.  She was seen in the ER there and discharged.  She had imaging discussed but was not proceeded to.  It was not thought to be a TIA and she did not receive any TPA or anything.  The story is that she has a history of migraine  presented with a possible syncopal episode, which she did not declare to me to be.  She had a similar incident on the part before she passed out.  She was seen in the ER there and released to follow with us and then she had a normal exam at that time.  Blood workup was extensive and she had MRI of the brain and no abnormalities were seen.    So in summary, we are not sure what these episodes tell us.  She did have a normal MRI.    We will see what her concentration of the drug is and follow through.  In the meantime, she will not drive.    Sincerely,    Jon Boss MD        D: 2021   T: 2021   MT: al    Name:     GENE GLEZ  MRN:      [id number]        Account:   [id number]   :      1975           Note Date: 2021       Document: [id number]

## 2021-06-15 LAB — LEVETIRACETAM SERPL-MCNC: 35 UG/ML (ref 12–46)

## 2021-06-18 ENCOUNTER — VIRTUAL VISIT (OUTPATIENT)
Dept: NEUROLOGY | Facility: CLINIC | Age: 46
End: 2021-06-18
Payer: MEDICARE

## 2021-06-18 DIAGNOSIS — G40.909 SEIZURE DISORDER (H): ICD-10-CM

## 2021-06-18 PROCEDURE — 99213 OFFICE O/P EST LOW 20 MIN: CPT | Mod: 95 | Performed by: PSYCHIATRY & NEUROLOGY

## 2021-06-18 RX ORDER — LEVETIRACETAM 1000 MG/1
TABLET ORAL
Qty: 90 TABLET | Refills: 11 | Status: SHIPPED | OUTPATIENT
Start: 2021-06-18 | End: 2021-07-05

## 2021-06-18 NOTE — PROGRESS NOTES
Iva is a 46 year old who is being evaluated via a billable telephone visit.      What phone number would you like to be contacted at? [phone]  How would you like to obtain your AVS? [facility]  Phone call [phone] minutes

## 2021-06-18 NOTE — PROGRESS NOTES
Service Date: 2021    Theresa Hall NP  [facility]  [address] [place]  [address]    RE:     Gene Bowman  MRN:  [id number]  :  1975    Dear Ms. Hall:    We had a telephone conversation today of 15 minutes with Ms. Gene Bowman to follow up on her recent episode.  The recent episode was reviewed in a note from the  that is typed, and in that one there was question of a seizure and she was on 1000 twice a day with good control, and there was an episode when she was watching Wheel of [employer] where she blacked out.  She has not had problems for a while.  We told her to stop driving after this episode, and she had called and reported it and did report it already to the Motor Vehicle Department.  She had some forms that needed to be filled out, and this was referred to me.  We did a blood level as she was seen at [facility] when this episode occurred, and everything checked out okay at that time and there had been no intercurrent illness or other problems.  The level of levetiracetam was done 2 days ago and it came back the same as before at 34.  We do not have full information, but it appears that this episode, at least we have to assume, was a seizure, as she has history of this.  She will not be driving for 3 months.  We will increase her dose of levetiracetam to 2500 a day from 2000 by adding another 500 in the morning.  This was called in to her pharmacy.  We will do her papers and follow her.  She is very accepting of this and will be careful not to drive.    Sincerely,      Jon Boss MD        D: 2021   T: 2021   MT: miri    Name:     GENE BOWMAN  MRN:      [id number]        Account:      [id number]   :      1975           Service Date: 2021       Document: [id number]

## 2021-06-18 NOTE — LETTER
6/18/2021       RE: Iva Bowman  [address]  [facility] [address]     Dear Colleague,    Thank you for referring your patient, Iva Bowman, to the [facility]. Please see a copy of my visit note below.    Iva is a 46 year old who is being evaluated via a billable telephone visit.      What phone number would you like to be contacted at? [phone]  How would you like to obtain your AVS? MyChart  Phone call [email or web address] minutes        Again, thank you for allowing me to participate in the care of your patient.      Sincerely,    Jon Boss MD

## 2021-06-24 ENCOUNTER — TELEPHONE (OUTPATIENT)
Dept: NEUROLOGY | Facility: CLINIC | Age: 46
End: 2021-06-24

## 2021-06-24 NOTE — TELEPHONE ENCOUNTER
[facility] Call Center    Phone Message    May a detailed message be left on voicemail: yes     Reason for Call: Form or Letter   Type or form/letter needing completion: DMV Form  Provider: Dr. Boss  Date form needed: ASAP  Once completed: Fax form to: DMV   Pt called to  Ask if this form has been received from one of her ILS worker. She would like to know the  Status of getting this form to the DMV.    Please call Pt back to advise,      Action Taken: Message routed to:  [facility] Neurology    Travel Screening: Not Applicable

## 2021-06-28 DIAGNOSIS — G40.909 SEIZURE DISORDER (H): ICD-10-CM

## 2021-06-30 RX ORDER — LEVETIRACETAM 1000 MG/1
TABLET ORAL
Qty: 225 TABLET | OUTPATIENT
Start: 2021-06-30

## 2021-07-01 ENCOUNTER — TELEPHONE (OUTPATIENT)
Dept: NEUROLOGY | Facility: CLINIC | Age: 46
End: 2021-07-01

## 2021-07-01 DIAGNOSIS — G40.109 PARTIAL EPILEPSY (H): Primary | ICD-10-CM

## 2021-07-01 DIAGNOSIS — G40.909 SEIZURE DISORDER (H): ICD-10-CM

## 2021-07-01 NOTE — TELEPHONE ENCOUNTER
[facility] Call Center    Phone Message    May a detailed message be left on voicemail: yes     Reason for Call: Other: Iva calling to request a call back. She would like to speak to her care team in regards to her levETIRAcetam (KEPPRA) 1000 MG tablet. Please call her back to discuss.      Action Taken: Message routed to:  [facility]  neuro     Travel Screening: Not Applicable

## 2021-07-05 ENCOUNTER — TELEPHONE (OUTPATIENT)
Dept: NEUROLOGY | Facility: CLINIC | Age: 46
End: 2021-07-05

## 2021-07-05 RX ORDER — LEVETIRACETAM 1000 MG/1
TABLET ORAL
Qty: 60 TABLET | Refills: 11 | Status: SHIPPED | OUTPATIENT
Start: 2021-07-05

## 2021-07-05 RX ORDER — LEVETIRACETAM 250 MG/1
TABLET ORAL
Qty: 30 TABLET | Refills: 11 | Status: SHIPPED | OUTPATIENT
Start: 2021-07-05

## 2021-07-05 NOTE — TELEPHONE ENCOUNTER
Returned call to patient who indicates she is uncomfortable with making the 500 mg increase in her Keppra that was prescribed.  She would be more comfortable starting with an increase of 250 mg.    Spoke with dr. Boss about this and he is ok with an increase of 250 mg and another script for the 250 tabs so the patient does not have to cut the 1000 mg tabs to get 250 mg tab.    Sending new orders today.    Camilo Lora RN

## 2021-07-05 NOTE — TELEPHONE ENCOUNTER
Placed call to patient to inform her that Dr. Boss was agreeable to med adjustment and that new orders were sent to her pharmacy.    Left voice mail with this information and asked that she call with any questions or difficulties.    Camilo Lora RN

## 2021-07-08 NOTE — TELEPHONE ENCOUNTER
[facility] Call Center    Phone Message    May a detailed message be left on voicemail: yes     Reason for Call: Other: Pt called to [facility] status of this form for the DMV. It is currently past due ans she needs it faxed to DMV ASAP please.    Please call Pt to advise of the status.    Action Taken: Message routed to:  [facility] Neurology    Travel Screening: Not Applicable

## 2021-07-09 NOTE — TELEPHONE ENCOUNTER
Returned call to patient to inform her it was faxed in 6/30/21.    Patient indicates that when she checked she was told it was not received.    Re-faxed copy and also mailed a copy to [place] [employer].    Camilo Lora RN

## 2021-09-22 ENCOUNTER — DOCUMENTATION ONLY (OUTPATIENT)
Dept: NEUROLOGY | Facility: CLINIC | Age: 46
End: 2021-09-22

## 2021-09-22 NOTE — PROGRESS NOTES
DMV form has been received ad signed by Dr Roe. Form has been faxed to DMV, scanned to chart, and original form  has been mailed to patient.

## 2021-09-23 NOTE — PROGRESS NOTES
Placed call to patient to inform her that this form went in and a copy was mailed to her.  Camilo Lora RN

## 2021-11-13 DIAGNOSIS — G40.909 SEIZURE DISORDER (H): ICD-10-CM

## 2021-11-15 DIAGNOSIS — G40.909 SEIZURE DISORDER (H): ICD-10-CM

## 2021-11-15 RX ORDER — LEVETIRACETAM 1000 MG/1
TABLET ORAL
Qty: 60 TABLET | Refills: 11 | OUTPATIENT
Start: 2021-11-15

## 2021-11-16 RX ORDER — LEVETIRACETAM 1000 MG/1
TABLET ORAL
Qty: 60 TABLET | Refills: 11 | OUTPATIENT
Start: 2021-11-16

## 2021-11-16 NOTE — TELEPHONE ENCOUNTER
LEVETIRACETAM 1000MG TABLETS   Last Written Prescription Date:  7/5/2021  Last Fill Quantity: 60,   # refills: 11  Sent to: [id number] - [facility] [address] AT [facility] [address]  With SIG:  Take one tab (1000 mg) each am (along with a 250 mg tab for total of 1250 mg each AM) and one tab (1000 mg) each PM.   Denied, refills in place     Take one tab (1000 mg) each am (along with a 250 mg tab for total of 1250 mg each AM) and one tab (1000 mg) each PM.

## 2023-01-03 ENCOUNTER — HOSPITAL ENCOUNTER (EMERGENCY)
Facility: CLINIC | Age: 48
Discharge: HOME | End: 2023-01-03
Attending: EMERGENCY MEDICINE | Admitting: EMERGENCY MEDICINE
Payer: COMMERCIAL

## 2023-01-03 ENCOUNTER — APPOINTMENT (OUTPATIENT)
Dept: GENERAL RADIOLOGY | Facility: CLINIC | Age: 48
End: 2023-01-03
Attending: EMERGENCY MEDICINE
Payer: COMMERCIAL

## 2023-01-03 VITALS
OXYGEN SATURATION: 100 % | DIASTOLIC BLOOD PRESSURE: 99 MMHG | HEART RATE: 99 BPM | RESPIRATION RATE: 18 BRPM | TEMPERATURE: 98.9 F | SYSTOLIC BLOOD PRESSURE: 155 MMHG

## 2023-01-03 DIAGNOSIS — R00.0 SINUS TACHYCARDIA: ICD-10-CM

## 2023-01-03 DIAGNOSIS — R07.89 CHEST WALL PAIN: ICD-10-CM

## 2023-01-03 LAB
ANION GAP SERPL CALCULATED.3IONS-SCNC: 8 MMOL/L (ref 7–15)
BASOPHILS # BLD AUTO: 0.1 10E3/UL (ref 0–0.2)
BASOPHILS NFR BLD AUTO: 1 %
BUN SERPL-MCNC: 11.5 MG/DL (ref 6–20)
CALCIUM SERPL-MCNC: 9.8 MG/DL (ref 8.6–10)
CHLORIDE SERPL-SCNC: 102 MMOL/L (ref 98–107)
CREAT SERPL-MCNC: 0.91 MG/DL (ref 0.51–0.95)
D DIMER PPP FEU-MCNC: <0.27 UG/ML FEU (ref 0–0.5)
DEPRECATED HCO3 PLAS-SCNC: 29 MMOL/L (ref 22–29)
EOSINOPHIL # BLD AUTO: 0.2 10E3/UL (ref 0–0.7)
EOSINOPHIL NFR BLD AUTO: 2 %
ERYTHROCYTE [DISTWIDTH] IN BLOOD BY AUTOMATED COUNT: 12.9 % (ref 10–15)
GFR SERPL CREATININE-BSD FRML MDRD: 79 ML/MIN/1.73M2
GLUCOSE SERPL-MCNC: 81 MG/DL (ref 70–99)
HCG SER QL IA.RAPID: NEGATIVE
HCT VFR BLD AUTO: 43.5 % (ref 35–47)
HGB BLD-MCNC: 13.9 G/DL (ref 11.7–15.7)
IMM GRANULOCYTES # BLD: 0.1 10E3/UL
IMM GRANULOCYTES NFR BLD: 1 %
LYMPHOCYTES # BLD AUTO: 2 10E3/UL (ref 0.8–5.3)
LYMPHOCYTES NFR BLD AUTO: 21 %
MCH RBC QN AUTO: 28.3 PG (ref 26.5–33)
MCHC RBC AUTO-ENTMCNC: 32 G/DL (ref 31.5–36.5)
MCV RBC AUTO: 89 FL (ref 78–100)
MONOCYTES # BLD AUTO: 1.1 10E3/UL (ref 0–1.3)
MONOCYTES NFR BLD AUTO: 11 %
NEUTROPHILS # BLD AUTO: 6.1 10E3/UL (ref 1.6–8.3)
NEUTROPHILS NFR BLD AUTO: 64 %
NRBC # BLD AUTO: 0 10E3/UL
NRBC BLD AUTO-RTO: 0 /100
PLATELET # BLD AUTO: 300 10E3/UL (ref 150–450)
POTASSIUM SERPL-SCNC: 3.9 MMOL/L (ref 3.4–5.3)
RBC # BLD AUTO: 4.91 10E6/UL (ref 3.8–5.2)
SODIUM SERPL-SCNC: 139 MMOL/L (ref 136–145)
TROPONIN T SERPL HS-MCNC: 8 NG/L
WBC # BLD AUTO: 9.5 10E3/UL (ref 4–11)

## 2023-01-03 PROCEDURE — 93005 ELECTROCARDIOGRAM TRACING: CPT

## 2023-01-03 PROCEDURE — 84484 ASSAY OF TROPONIN QUANT: CPT | Performed by: EMERGENCY MEDICINE

## 2023-01-03 PROCEDURE — 99285 EMERGENCY DEPT VISIT HI MDM: CPT | Mod: 25

## 2023-01-03 PROCEDURE — 84703 CHORIONIC GONADOTROPIN ASSAY: CPT

## 2023-01-03 PROCEDURE — 250N000013 HC RX MED GY IP 250 OP 250 PS 637: Performed by: EMERGENCY MEDICINE

## 2023-01-03 PROCEDURE — 80048 BASIC METABOLIC PNL TOTAL CA: CPT | Performed by: EMERGENCY MEDICINE

## 2023-01-03 PROCEDURE — 85025 COMPLETE CBC W/AUTO DIFF WBC: CPT | Performed by: EMERGENCY MEDICINE

## 2023-01-03 PROCEDURE — 36415 COLL VENOUS BLD VENIPUNCTURE: CPT | Performed by: EMERGENCY MEDICINE

## 2023-01-03 PROCEDURE — 71046 X-RAY EXAM CHEST 2 VIEWS: CPT

## 2023-01-03 PROCEDURE — 250N000009 HC RX 250: Performed by: EMERGENCY MEDICINE

## 2023-01-03 PROCEDURE — 85379 FIBRIN DEGRADATION QUANT: CPT | Performed by: EMERGENCY MEDICINE

## 2023-01-03 RX ORDER — ACETAMINOPHEN 500 MG
1000 TABLET ORAL ONCE
Status: COMPLETED | OUTPATIENT
Start: 2023-01-03 | End: 2023-01-03

## 2023-01-03 RX ORDER — ACETAMINOPHEN 500 MG
500-1000 TABLET ORAL EVERY 6 HOURS PRN
Qty: 60 TABLET | Refills: 0 | Status: SHIPPED | OUTPATIENT
Start: 2023-01-03 | End: 2023-01-10

## 2023-01-03 RX ORDER — IBUPROFEN 600 MG/1
600 TABLET, FILM COATED ORAL EVERY 6 HOURS PRN
Qty: 30 TABLET | Refills: 0 | Status: SHIPPED | OUTPATIENT
Start: 2023-01-03

## 2023-01-03 RX ORDER — IBUPROFEN 800 MG/1
800 TABLET, FILM COATED ORAL ONCE
Status: COMPLETED | OUTPATIENT
Start: 2023-01-03 | End: 2023-01-03

## 2023-01-03 RX ORDER — METHOCARBAMOL 750 MG/1
750-1500 TABLET, FILM COATED ORAL 3 TIMES DAILY PRN
Qty: 10 TABLET | Refills: 0 | Status: SHIPPED | OUTPATIENT
Start: 2023-01-03 | End: 2023-01-08

## 2023-01-03 RX ORDER — METHOCARBAMOL 500 MG/1
1000 TABLET, FILM COATED ORAL ONCE
Status: COMPLETED | OUTPATIENT
Start: 2023-01-03 | End: 2023-01-03

## 2023-01-03 RX ADMIN — LIDOCAINE HYDROCHLORIDE 30 ML: 20 SOLUTION ORAL; TOPICAL at 18:17

## 2023-01-03 RX ADMIN — IBUPROFEN 800 MG: 800 TABLET, FILM COATED ORAL at 19:08

## 2023-01-03 RX ADMIN — ACETAMINOPHEN 1000 MG: 500 TABLET ORAL at 19:08

## 2023-01-03 RX ADMIN — METHOCARBAMOL 1000 MG: 500 TABLET ORAL at 18:32

## 2023-01-03 ASSESSMENT — ACTIVITIES OF DAILY LIVING (ADL)
ADLS_ACUITY_SCORE: 35
ADLS_ACUITY_SCORE: 35

## 2023-01-03 NOTE — ED PROVIDER NOTES
History     Chief Complaint:  Chest pain        The history is provided by the patient.      Iva Bowman is a 47 year old female with history of hypertension, GERD, paroxysmal SVT, seizure, prediabetes, ASCUS, among others who presents with localized left sided chest pain that began after the patient was shoveling prior to her arrival. Alongside this, the patient reports cough, nausea, and sleepiness. When the pain began, she called 911. She describes the pain as sharp and a 5 out of 10, but it was greater during the initial onset. She also notes pain during breathing. Iva states that she has had chest pain like this before. The patient denies diarrhea, fever, loss of consciousness, hallucinations, illicit drug use, suicidal ideation, or homicidal ideation. She also denies history of blood clots. Iva reports that she has had an increased heart rate over the past year and had an ablation performed in 2019. She is a former smoker and lives in an apartment by herself. Iva has a cardiologist.    Independent Historian: Yes.     Review of External Notes: Yes.     ROS:  See HPI.    Allergies:  Benzonatate  Haloperidol  Lorazepam  Droperidol  Levothyroxine  Phenothiazines  Prednisone  Aripiprazole  Buspirone  Ceclor [Cephalosporins]  Cefaclor   Compazine  Dihydroergotamine  Fluoxetine  Olanzapine  Phenergan [Promethazine Hcl]  Reglan [Metoclopramide]  Risperidone  Tessalon Perles [Benzonatate]  Gabapentin  Gnp Allergy-Congestion Relief  Ketorolac  Penicillins  Propranolol  Pseudoephedrine  Rizatriptan  Sumatriptan   Prochlorperazine  Vortioxetine  Promethazine   Droperidol  Abilify  Diphenhydramine   Loratadine-Pseudoephedrine  Neurontin  Zyprexa  Botulinum Toxin  Olanzapine    Medications:    Albuterol  Excedrin migraine  Dramamine  Flonase  Flovent HFA  Emgality  Robitussin AC  Keppra  Levothyroxine    Lisinopril  Cozaar  Provera  Melatonin  Zofran  Protonix  Miralax  Inderal  Nurtec  Ubrelvy  Trintellix  Sinequan  Atarax  Robaxin  Pepcid  Atrovent HFA  Lamictal  Xopenex  Percocet  Imitrex  Astelin  Naprosyn  Polytrim  Valtrex  Glycolax  Antivert  Singulair  Keppra    Past Medical History:    Anemia   Anxiety   Arrhythmia   Depressive disorder   Fibromyalgia   GERD  Herpes simplex   Hypertension   Hypothyroidism   IBS   Migraine   Seizures     Depression   Gastric ulcer  Urinary incontinence  Angioedema of lips  Sinusitis   PTSD  Tobacco abuse  Fibromyalgia  Chronic rhinitis  Pre-diabetes  Nephrolithiasis  Pulmonary nodule, left  Suicide attempt  Conduct disorder  Borderline personality disorder   Drug overdose  Unspecified disorder of skin and subcutaneous tissue  Hemorrhoids  Acne  Varicella  Partial epilepsy  Movement disorder  Paroxysmal SVT  Elevated troponin  Achilles bursitis, bilateral  Neoplasm of uncertain behavior of skin  ASCUS  Memory problem  Vitamin D deficiency  Atypical endometrial hyperplasia  Gottron's papules  Undifferentiated somatoform disorder    Past Surgical History:    Ankle arthroscopy  Cardiac ablation  Cholecystectomy  Cervical cryotherapy  D&C  PE tubes  Retrocalcaneal bursitis and Sarah deformity treatment  Tonsillectomy  Cystoscopy  wisdom teeth extraction  CSF shunt     Family History:    Family history includes Bipolar Disorder in her son; Cancer in her maternal grandfather; Depression in her mother; Eye Disorder in her maternal grandmother; Heart Disease in her maternal grandmother, paternal grandfather, mother, and paternal grandmother; Lipids in her father and paternal grandmother; Musculoskeletal Disorder in her mother; Neurologic Disorder in her paternal grandfather; Obesity in her maternal grandmother and mother; Osteoporosis in her maternal grandmother; Hypertension in her father; Migraines in her mother and son.    Social History:  Reports that she quit smoking  about 24 years ago. Her smoking use included cigarettes. She has never used smokeless tobacco. She reports that she does not drink alcohol and does not use drugs. The patient presents to the Ed alone via EMS. The patient lives alone in an apartment.  PCP: Theresa Hall     Physical Exam     Patient Vitals for the past 24 hrs:   BP Temp Temp src Pulse Resp SpO2   01/03/23 1704 (!) 158/113 98.9  F (37.2  C) Temporal 112 20 100 %        Physical Exam  Constitutional: Well developed, nontox appearance  Head: Atraumatic.   Neck:  no stridor  Eyes: no scleral icterus  Cardiovascular: RRR, 2+ bilat radial pulses  Pulmonary/Chest: nml resp effort, Clear BS bilat,   Abdominal: ND, soft, NT, no rebound or guarding   Ext: Warm, well perfused, no edema  Neurological: A&O, symmetric facies, moves ext x4  Skin: Skin is warm and dry.   Psychiatric: Behavior is normal. Thought content normal.   Nursing note and vitals reviewed.    Emergency Department Course   ECG  ECG taken at 1714, ECG read at 1716  Sinus tachycardia   Otherwise normal ECG  No significant changes as compared to prior, dated 07/06/2020.  Rate 108 bpm. ND interval 142 ms. QRS duration 82 ms. QT/QTc 342/458 ms. P-R-T axes 43 54 47.     Imaging:  Chest XR,  PA & LAT   Final Result   IMPRESSION: Lungs are clear. No pleural effusion. Heart size and pulmonary vascularity within normal limits.         Report per radiology    Laboratory:  Labs Ordered and Resulted from Time of ED Arrival to Time of ED Departure   BASIC METABOLIC PANEL - Normal       Result Value    Sodium 139      Potassium 3.9      Chloride 102      Carbon Dioxide (CO2) 29      Anion Gap 8      Urea Nitrogen 11.5      Creatinine 0.91      Calcium 9.8      Glucose 81      GFR Estimate 79     TROPONIN T, HIGH SENSITIVITY - Normal    Troponin T, High Sensitivity 8     D DIMER QUANTITATIVE - Normal    D-Dimer Quantitative <0.27     ISTAT HCG QUALITATIVE PREGNANCY POCT - Normal    HCG Qualitative POCT  Negative     CBC WITH PLATELETS AND DIFFERENTIAL    WBC Count 9.5      RBC Count 4.91      Hemoglobin 13.9      Hematocrit 43.5      MCV 89      MCH 28.3      MCHC 32.0      RDW 12.9      Platelet Count 300      % Neutrophils 64      % Lymphocytes 21      % Monocytes 11      % Eosinophils 2      % Basophils 1      % Immature Granulocytes 1      NRBCs per 100 WBC 0      Absolute Neutrophils 6.1      Absolute Lymphocytes 2.0      Absolute Monocytes 1.1      Absolute Eosinophils 0.2      Absolute Basophils 0.1      Absolute Immature Granulocytes 0.1      Absolute NRBCs 0.0        Emergency Department Course & Assessments:       Interventions:  Medications   lidocaine (viscous) (XYLOCAINE) 2 % 15 mL, alum & mag hydroxide-simethicone (MAALOX) 15 mL GI Cocktail (30 mLs Oral Given 1/3/23 1817)   methocarbamol (ROBAXIN) tablet 1,000 mg (1,000 mg Oral Given 1/3/23 1832)   acetaminophen (TYLENOL) tablet 1,000 mg (1,000 mg Oral Given 1/3/23 1908)   ibuprofen (ADVIL/MOTRIN) tablet 800 mg (800 mg Oral Given 1/3/23 1908)        Independent Interpretation (X-rays, CTs, rhythm strip):  See MDM.    Consultations/Discussion of Management or Tests:  ED Course as of 23e 2023   170 I obtained history and examined the patient as noted above.       Social Determinants of Health affecting care:  The patient is a former smoker and has history of overdose.    Disposition:  The patient was discharged to home.     Impression & Plan      Medical Decision Makin year old female presenting w/ reproducible L sided chest discomfort     DDx includes ACS, unstable angina, chest wall pain, GERD, pneumothorax, pneumonia.  EKG interpretation as noted above sig for no acute ischemic findings.  Labs and imaging ordered as noted above.  Labs significant for D-dimer within normal limits, troponin within normal limits.  Imaging sig for no acute cardiopulmonary disease and reviewed independently by myself.  Interventions as noted  above.  Given reproducibility on exam, patient's presentation seems more consistent with chest wall pain in context of her unremarkable work-up.  Prescriptions written as noted below for outpatient management. At this time I feel the patient is safe for discharge.  Recommendations given regarding follow up with PCP and return to the emergency department as needed for new or worsening symptoms.    Patient counseled on all results, diagnosis and disposition.  They are understanding and agreeable to plan. Patient discharged in stable condition.      Diagnosis:    ICD-10-CM    1. Chest wall pain  R07.89       2. Sinus tachycardia  R00.0            Discharge Medications:  New Prescriptions    ACETAMINOPHEN (TYLENOL) 500 MG TABLET    Take 1-2 tablets (500-1,000 mg) by mouth every 6 hours as needed for pain or fever    IBUPROFEN (ADVIL/MOTRIN) 600 MG TABLET    Take 1 tablet (600 mg) by mouth every 6 hours as needed for moderate pain (4-6)    METHOCARBAMOL (ROBAXIN) 750 MG TABLET    Take 1-2 tablets (750-1,500 mg) by mouth 3 times daily as needed for muscle spasms        Scribe Disclosure:  I, Ben Rangel, am serving as a scribe at 4:57 PM on 1/3/2023 to document services personally performed by Federico Birch MD based on my observations and the provider's statements to me.     1/3/2023   Federico Birch MD Vaughn, Christopher E, MD  01/03/23 7227

## 2023-01-03 NOTE — ED TRIAGE NOTES
"Arrives via EMS with complaints of chest pain and \"feeling out of it\", arrives alert and oriented, complains of left side chest pain, otherwise states she \"feels better\", ABCs intact at this time.     Triage Assessment     Row Name 01/03/23 2814       Triage Assessment (Adult)    Airway WDL WDL       Respiratory WDL    Respiratory WDL WDL       Skin Circulation/Temperature WDL    Skin Circulation/Temperature WDL WDL       Cardiac WDL    Cardiac WDL X;all    Pulse Rate & Regularity tachycardic       Chest Pain Assessment    Chest Pain Location anterior chest, left              "

## 2023-01-03 NOTE — ED NOTES
[facility]  Expected date:   Expected time:   Means of arrival:   Comments:  Renée Stern-A lianna

## 2023-01-04 LAB
ATRIAL RATE - MUSE: 108 BPM
DIASTOLIC BLOOD PRESSURE - MUSE: NORMAL MMHG
INTERPRETATION ECG - MUSE: NORMAL
P AXIS - MUSE: 43 DEGREES
PR INTERVAL - MUSE: 142 MS
QRS DURATION - MUSE: 82 MS
QT - MUSE: 342 MS
QTC - MUSE: 458 MS
R AXIS - MUSE: 54 DEGREES
SYSTOLIC BLOOD PRESSURE - MUSE: NORMAL MMHG
T AXIS - MUSE: 47 DEGREES
VENTRICULAR RATE- MUSE: 108 BPM

## 2023-01-04 NOTE — DISCHARGE INSTRUCTIONS
-Take acetaminophen 500 to 1000 mg by mouth every 4 to 6 hours as needed for pain or fever.  Do not take more than 4000 mg in 24 hours.  Do not take within 6 hours of another acetaminophen containing medication such as norco (vicodin) or percocet.  - Take ibuprofen 600 to 800 mg by mouth every 6 to 8 hours as needed for pain or fever      Discharge Instructions  Chest Injury    You have been seen today because of a chest injury.  You may have contusion (bruise) of the chest or a rib fracture (broken bone).  Rib fractures can be hard to see on x-ray, so we cannot always be sure whether your rib is broken or bruised. Fortunately, the treatment of these injuries is usually the same, and includes pain control and preventing complications.    Generally, every Emergency Department visit should have a follow-up clinic visit with either a primary or a specialty clinic/provider. Please follow-up as instructed by your emergency provider today.    Return to the Emergency Department if:  You become short of breath.  You develop a fever over 101.5 F.  You pass out or become very weak or pale.  You have abdominal (belly) pain that is new or increasing.  You cough up blood.  You have new symptoms or anything that worries you.    Follow-up with your provider:  As directed by your provider today.  If you are not improved in two weeks.  If you need more pain medicine, since we do not refill pain pills through the Emergency Department.    Home care instructions:  Chest injuries can be painful.  You may take an over-the-counter pain medication such as Tylenol  (acetaminophen), Advil  (ibuprofen), Motrin  (ibuprofen) or Aleve  (naproxen).  Applying ice packs to the painful area can help your pain.   Holding a pillow against your chest can help with pain when you need to move or cough.  You may need to rest and avoid lifting particularly in the first few days after your injury.  Prevention of pneumonia (lung infection) is also a part of  managing chest injuries.  Because it can hurt to take deep breaths, you could develop collapsed areas of lung that can develop infection.  To prevent this, you need to take ten very deep breaths every hour while you are awake. Sometimes you will be given a device called an incentive spirometer to help with this. You also need to make yourself cough every hour.  Rib belts or binders are not generally recommended, since they may increase the risk of pneumonia. If you do use one, use it for only short periods of time.   If you were given a prescription for medicine here today, be sure to read all of the information (including the package insert) that comes with your prescription.  This will include important information about the medicine, its side effects, and any warnings that you need to know about.  The pharmacist who fills the prescription can provide more information and answer questions you may have about the medicine.  If you have questions or concerns that the pharmacist cannot address, please call or return to the Emergency Department.   Remember that you can always come back to the Emergency Department if you are not able to see your regular provider in the amount of time listed above, if you get any new symptoms, or if there is anything that worries you.

## 2024-09-10 NOTE — DISCHARGE INSTRUCTIONS
Follow-up established care and services  Take hydroxyzine to help with anxiety  Work on healthy coping through therapy   10-Sep-2024 13:53

## 2024-12-15 NOTE — ED NOTES
Bed: ED09  Expected date: 6/28/20  Expected time: 7:57 PM  Means of arrival: Ambulance  Comments:  [phone]  
ED VISIT ADDENDUM:    The care of this patient was signed out to me at shift change by Dr Lopez pending DEC evaluation. No indication for BH admission at this time. Safety plan created by DEC and provided to patient. DC with strict return precautions and outpatient follow-up resources.    Anthony Ferrell MD  [facility]  [facility]           Anthony Ferrell MD  06/29/20 0037    
see hpi

## (undated) DEVICE — SOL NACL 0.9% IRRIG 3000ML BAG 2B7477

## (undated) DEVICE — PACK MINOR LITHOTOMY RIDGES

## (undated) DEVICE — LINEN HALF SHEET 5512

## (undated) DEVICE — DEVICE TISSUE REMOVAL HYSTEROSCOPIC MYOSURE LITE 30-401LITE

## (undated) DEVICE — GLOVE PROTEXIS W/NEU-THERA 6.0  2D73TE60

## (undated) DEVICE — SOL NACL 0.9% IRRIG 1000ML BOTTLE 2F7124

## (undated) DEVICE — LINEN FULL SHEET 5511

## (undated) DEVICE — SEAL SET MYOSURE ROD LENS SCOPE SINGLE USE 40-902

## (undated) DEVICE — LINEN DRAPE 54X72" 5467

## (undated) DEVICE — BASIN SET MINOR DISP

## (undated) DEVICE — SPECIMEN BAG BEMIS HI FLOW SUCTION WHITE SOCK 533810

## (undated) DEVICE — TUBING SYS AQUILEX BLUE INFLOW AQL-110 YLW OUTFLOW AQL-111

## (undated) DEVICE — SUCTION CANISTER BEMIS HI FLOW 006772-901

## (undated) DEVICE — BAG CLEAR TRASH 1.3M 39X33" P4040C

## (undated) RX ORDER — ONDANSETRON 2 MG/ML
INJECTION INTRAMUSCULAR; INTRAVENOUS
Status: DISPENSED
Start: 2019-08-20

## (undated) RX ORDER — FENTANYL CITRATE 50 UG/ML
INJECTION, SOLUTION INTRAMUSCULAR; INTRAVENOUS
Status: DISPENSED
Start: 2019-08-20

## (undated) RX ORDER — ACETAMINOPHEN 325 MG/1
TABLET ORAL
Status: DISPENSED
Start: 2019-08-20

## (undated) RX ORDER — DEXAMETHASONE SODIUM PHOSPHATE 4 MG/ML
INJECTION, SOLUTION INTRA-ARTICULAR; INTRALESIONAL; INTRAMUSCULAR; INTRAVENOUS; SOFT TISSUE
Status: DISPENSED
Start: 2019-08-20

## (undated) RX ORDER — SCOLOPAMINE TRANSDERMAL SYSTEM 1 MG/1
PATCH, EXTENDED RELEASE TRANSDERMAL
Status: DISPENSED
Start: 2019-08-20

## (undated) RX ORDER — PROPOFOL 10 MG/ML
INJECTION, EMULSION INTRAVENOUS
Status: DISPENSED
Start: 2019-08-20

## (undated) RX ORDER — CITRIC ACID/SODIUM CITRATE 334-500MG
SOLUTION, ORAL ORAL
Status: DISPENSED
Start: 2019-08-20

## (undated) RX ORDER — LIDOCAINE HYDROCHLORIDE 10 MG/ML
INJECTION, SOLUTION EPIDURAL; INFILTRATION; INTRACAUDAL; PERINEURAL
Status: DISPENSED
Start: 2019-08-20

## (undated) RX ORDER — KETOROLAC TROMETHAMINE 30 MG/ML
INJECTION, SOLUTION INTRAMUSCULAR; INTRAVENOUS
Status: DISPENSED
Start: 2019-08-20